# Patient Record
Sex: FEMALE | Race: WHITE | NOT HISPANIC OR LATINO | Employment: FULL TIME | URBAN - METROPOLITAN AREA
[De-identification: names, ages, dates, MRNs, and addresses within clinical notes are randomized per-mention and may not be internally consistent; named-entity substitution may affect disease eponyms.]

---

## 2017-12-28 ENCOUNTER — OFFICE VISIT (OUTPATIENT)
Dept: FAMILY MEDICINE | Facility: CLINIC | Age: 47
End: 2017-12-28
Payer: COMMERCIAL

## 2017-12-28 VITALS
DIASTOLIC BLOOD PRESSURE: 66 MMHG | TEMPERATURE: 98.2 F | BODY MASS INDEX: 36.97 KG/M2 | HEART RATE: 72 BPM | RESPIRATION RATE: 20 BRPM | SYSTOLIC BLOOD PRESSURE: 112 MMHG | WEIGHT: 205.4 LBS

## 2017-12-28 DIAGNOSIS — R07.0 THROAT PAIN: ICD-10-CM

## 2017-12-28 DIAGNOSIS — B34.9 VIRAL SYNDROME: Primary | ICD-10-CM

## 2017-12-28 LAB
DEPRECATED S PYO AG THROAT QL EIA: NORMAL
SPECIMEN SOURCE: NORMAL

## 2017-12-28 PROCEDURE — 99213 OFFICE O/P EST LOW 20 MIN: CPT

## 2017-12-28 PROCEDURE — 87081 CULTURE SCREEN ONLY: CPT | Performed by: NURSE PRACTITIONER

## 2017-12-28 PROCEDURE — 87880 STREP A ASSAY W/OPTIC: CPT | Performed by: NURSE PRACTITIONER

## 2017-12-28 NOTE — LETTER
January 2, 2018      Risa Alcaraz  [address] [facility] [id number]        Dear Risa,           This letter is to inform you that the results of your recent throat culture are negative.  If you have any questions please call or make an appointment.          Sincerely,        JIMBO Phan CNP

## 2017-12-28 NOTE — PROGRESS NOTES
SUBJECTIVE:   Risa Alcaraz is a 47 year old female who presents to clinic today for the following health issues:    ENT Symptoms             Symptoms: cc Present Absent Comment   Fever/Chills  x  Chills    Fatigue  x     Muscle Aches       Eye Irritation       Sneezing       Nasal Charli/Drg  x     Sinus Pressure/Pain       Loss of smell       Dental pain       Sore Throat  x     Swollen Glands       Ear Pain/Fullness       Cough  x     Wheeze       Chest Pain  x  Pressure    Shortness of breath       Rash       Other  x  Headaches      Symptom duration:  Fátima brandon   Symptom severity:  worse    Treatments tried:  Mucinex, Tylenol    Contacts:  Son was sick last week, he got better though                Problem list and histories reviewed & adjusted, as indicated.  Additional history: as documented    Patient Active Problem List   Diagnosis     Allergic rhinitis     CARDIOVASCULAR SCREENING; LDL GOAL LESS THAN 160     Obesity     IUD (intrauterine device) in place     Past Surgical History:   Procedure Laterality Date     C ORAL SURGERY PROCEDURE      wisdom teeth, hypotension with anesthesia       Social History   Substance Use Topics     Smoking status: Former Smoker     Smokeless tobacco: Never Used      Comment: Quit 1996     Alcohol use No      Comment: one or two beers before she knew she was preg.     Family History   Problem Relation Age of Onset     DIABETES Mother      Hypertension Mother      CANCER Father      penial     Hypertension Father      CANCER Maternal Grandmother      liver     HEART DISEASE Maternal Grandfather      MI     CANCER Paternal Grandmother      brain     HEART DISEASE Paternal Grandfather      MI         Current Outpatient Prescriptions   Medication Sig Dispense Refill     TYLENOL 325 MG OR TABS 2 TABLETS EVERY 4 HOURS AS NEEDED       MULTIPLE VITAMIN OR 1 tablet daily       CALCIUM + D OR 1 TABLET DAILY       levonorgestrel (MIRENA) 20 MCG/24HR IUD 1 each (20 mcg) by  Intrauterine route once for 1 dose 1 each 0     No Known Allergies  Labs reviewed in EPIC      Reviewed and updated as needed this visit by clinical staffTobacco  Allergies  Meds  Problems  Med Hx  Surg Hx  Fam Hx  Soc Hx        Reviewed and updated as needed this visit by Provider  Allergies  Meds  Problems         ROS:  Constitutional, HEENT, cardiovascular, pulmonary, GI, , musculoskeletal, neuro, skin, endocrine and psych systems are negative, except as otherwise noted.      OBJECTIVE:   /66 (BP Location: Right arm, Cuff Size: Adult Large)  Pulse 72  Temp 98.2  F (36.8  C) (Tympanic)  Resp 20  Wt 205 lb 6.4 oz (93.2 kg)  BMI 36.97 kg/m2  Body mass index is 36.97 kg/(m^2).   GENERAL: healthy, alert and no distress, nontoxic in appearance  EYES: Eyes grossly normal to inspection, PERRL and conjunctivae and sclerae normal  HENT: ear canals and TM's normal, nose and mouth without ulcers or lesions  NECK: no adenopathy, supple with full ROM  RESP: lungs clear to auscultation - no rales, rhonchi or wheezes  CV: regular rate and rhythm, normal S1 S2, no S3 or S4, no murmur, click or rub, no peripheral edema   ABDOMEN: soft, nontender, no hepatosplenomegaly, no masses   MS: no gross musculoskeletal defects noted, no edema  No rash        ASSESSMENT/PLAN:   Neg rapid strep  Problem List Items Addressed This Visit     None      Visit Diagnoses     Viral syndrome    -  Primary    Throat pain        Relevant Orders    Strep, Rapid Screen (Completed)    Beta strep group A culture (Completed)               Patient Instructions   Increase rest and fluids. Tylenol and/or Ibuprofen for comfort. Cool mist vaporizer. If your symptoms worsen or do not resolve follow up with your primary care provider in 1 week and sooner if needed.      Strep culture is pending will result in 24-48 hours.  If it is positive and change in treatment plan will contact you.      Continue Mucinex 600 mg 12 hour formula for ear,  "head and chest congestion.  It can also thin post nasal drip which can cause a cough and sore throat.      Indications for emergent return to emergency department discussed with patient, who verbalized good understanding and agreement.  Patient understands the limitations of today's evaluation.           Viral Syndrome (Adult)  A viral illness may cause a number of symptoms. The symptoms depend on the part of the body that the virus affects. If it settles in your nose, throat, and lungs, it may cause cough, sore throat, congestion, and sometimes headache. If it settles in your stomach and intestinal tract, it may cause vomiting and diarrhea. Sometimes it causes vague symptoms like \"aching all over,\" feeling tired, loss of appetite, or fever.  A viral illness usually lasts 1 to 2 weeks, but sometimes it lasts longer. In some cases, a more serious infection can look like a viral syndrome in the first few days of the illness. You may need another exam and additional tests to know the difference. Watch for the warning signs listed below.  Home care  Follow these guidelines for taking care of yourself at home:    If symptoms are severe, rest at home for the first 2 to 3 days.    Stay away from cigarette smoke - both your smoke and the smoke from others.    You may use over-the-counter acetaminophen or ibuprofen for fever, muscle aching, and headache, unless another medicine was prescribed for this. If you have chronic liver or kidney disease or ever had a stomach ulcer or GI bleeding, talk with your doctor before using these medicines. No one who is younger than 18 and ill with a fever should take aspirin. It may cause severe disease or death.    Your appetite may be poor, so a light diet is fine. Avoid dehydration by drinking 8 to 12 8-ounce glasses of fluids each day. This may include water; orange juice; lemonade; apple, grape, and cranberry juice; clear fruit drinks; electrolyte replacement and sports drinks; and " decaffeinated teas and coffee. If you have been diagnosed with a kidney disease, ask your doctor how much and what types of fluids you should drink to prevent dehydration. If you have kidney disease, drinking too much fluid can cause it build up in the your body and be dangerous to your health.    Over-the-counter remedies won't shorten the length of the illness but may be helpful for cough, sore throat; and nasal and sinus congestion. Don't use decongestants if you have high blood pressure.  Follow-up care  Follow up with your healthcare provider if you do not improve over the next week.  Call 911  Get emergency medical care if any of the following occur:    Convulsion    Feeling weak, dizzy, or like you are going to faint    Chest pain, shortness of breath, wheezing, or difficulty breathing  When to seek medical advice  Call your healthcare provider right away if any of these occur:    Cough with lots of colored sputum (mucus) or blood in your sputum    Chest pain, shortness of breath, wheezing, or difficulty breathing    Severe headache; face, neck, or ear pain    Severe, constant pain in the lower right side of your belly (abdominal)    Continued vomiting (can t keep liquids down)    Frequent diarrhea (more than 5 times a day); blood (red or black color) or mucus in diarrhea    Feeling weak, dizzy, or like you are going to faint    Extreme thirst    Fever of 100.4 F (38 C) or higher, or as directed by your healthcare provider  Date Last Reviewed: 9/25/2015 2000-2017 [employer]. [address] [place] [address]. All rights reserved. This information is not intended as a substitute for professional medical care. Always follow your healthcare professional's instructions.          CHINEDU Graf SAME DAY PROVIDER  [facility]

## 2017-12-28 NOTE — PATIENT INSTRUCTIONS
"Increase rest and fluids. Tylenol and/or Ibuprofen for comfort. Cool mist vaporizer. If your symptoms worsen or do not resolve follow up with your primary care provider in 1 week and sooner if needed.      Strep culture is pending will result in 24-48 hours.  If it is positive and change in treatment plan will contact you.      Continue Mucinex 600 mg 12 hour formula for ear, head and chest congestion.  It can also thin post nasal drip which can cause a cough and sore throat.      Indications for emergent return to emergency department discussed with patient, who verbalized good understanding and agreement.  Patient understands the limitations of today's evaluation.           Viral Syndrome (Adult)  A viral illness may cause a number of symptoms. The symptoms depend on the part of the body that the virus affects. If it settles in your nose, throat, and lungs, it may cause cough, sore throat, congestion, and sometimes headache. If it settles in your stomach and intestinal tract, it may cause vomiting and diarrhea. Sometimes it causes vague symptoms like \"aching all over,\" feeling tired, loss of appetite, or fever.  A viral illness usually lasts 1 to 2 weeks, but sometimes it lasts longer. In some cases, a more serious infection can look like a viral syndrome in the first few days of the illness. You may need another exam and additional tests to know the difference. Watch for the warning signs listed below.  Home care  Follow these guidelines for taking care of yourself at home:    If symptoms are severe, rest at home for the first 2 to 3 days.    Stay away from cigarette smoke - both your smoke and the smoke from others.    You may use over-the-counter acetaminophen or ibuprofen for fever, muscle aching, and headache, unless another medicine was prescribed for this. If you have chronic liver or kidney disease or ever had a stomach ulcer or GI bleeding, talk with your doctor before using these medicines. No one who is " younger than 18 and ill with a fever should take aspirin. It may cause severe disease or death.    Your appetite may be poor, so a light diet is fine. Avoid dehydration by drinking 8 to 12 8-ounce glasses of fluids each day. This may include water; orange juice; lemonade; apple, grape, and cranberry juice; clear fruit drinks; electrolyte replacement and sports drinks; and decaffeinated teas and coffee. If you have been diagnosed with a kidney disease, ask your doctor how much and what types of fluids you should drink to prevent dehydration. If you have kidney disease, drinking too much fluid can cause it build up in the your body and be dangerous to your health.    Over-the-counter remedies won't shorten the length of the illness but may be helpful for cough, sore throat; and nasal and sinus congestion. Don't use decongestants if you have high blood pressure.  Follow-up care  Follow up with your healthcare provider if you do not improve over the next week.  Call 911  Get emergency medical care if any of the following occur:    Convulsion    Feeling weak, dizzy, or like you are going to faint    Chest pain, shortness of breath, wheezing, or difficulty breathing  When to seek medical advice  Call your healthcare provider right away if any of these occur:    Cough with lots of colored sputum (mucus) or blood in your sputum    Chest pain, shortness of breath, wheezing, or difficulty breathing    Severe headache; face, neck, or ear pain    Severe, constant pain in the lower right side of your belly (abdominal)    Continued vomiting (can t keep liquids down)    Frequent diarrhea (more than 5 times a day); blood (red or black color) or mucus in diarrhea    Feeling weak, dizzy, or like you are going to faint    Extreme thirst    Fever of 100.4 F (38 C) or higher, or as directed by your healthcare provider  Date Last Reviewed: 9/25/2015 2000-2017 [employer]. [address] [place] [address], [place] [address]. All  rights reserved. This information is not intended as a substitute for professional medical care. Always follow your healthcare professional's instructions.

## 2017-12-28 NOTE — MR AVS SNAPSHOT
"              After Visit Summary   12/28/2017    Risa Alcaraz    MRN: [id number]           Patient Information     Date Of Birth          1970        Visit Information        Provider Department      12/28/2017 11:20 AM VEDA SAME DAY PROVIDER [facility]        Today's Diagnoses     Viral syndrome    -  1    Throat pain          Care Instructions    Increase rest and fluids. Tylenol and/or Ibuprofen for comfort. Cool mist vaporizer. If your symptoms worsen or do not resolve follow up with your primary care provider in 1 week and sooner if needed.      Strep culture is pending will result in 24-48 hours.  If it is positive and change in treatment plan will contact you.          Indications for emergent return to emergency department discussed with patient, who verbalized good understanding and agreement.  Patient understands the limitations of today's evaluation.           Viral Syndrome (Adult)  A viral illness may cause a number of symptoms. The symptoms depend on the part of the body that the virus affects. If it settles in your nose, throat, and lungs, it may cause cough, sore throat, congestion, and sometimes headache. If it settles in your stomach and intestinal tract, it may cause vomiting and diarrhea. Sometimes it causes vague symptoms like \"aching all over,\" feeling tired, loss of appetite, or fever.  A viral illness usually lasts 1 to 2 weeks, but sometimes it lasts longer. In some cases, a more serious infection can look like a viral syndrome in the first few days of the illness. You may need another exam and additional tests to know the difference. Watch for the warning signs listed below.  Home care  Follow these guidelines for taking care of yourself at home:    If symptoms are severe, rest at home for the first 2 to 3 days.    Stay away from cigarette smoke - both your smoke and the smoke from others.    You may use over-the-counter acetaminophen or ibuprofen for fever, " muscle aching, and headache, unless another medicine was prescribed for this. If you have chronic liver or kidney disease or ever had a stomach ulcer or GI bleeding, talk with your doctor before using these medicines. No one who is younger than 18 and ill with a fever should take aspirin. It may cause severe disease or death.    Your appetite may be poor, so a light diet is fine. Avoid dehydration by drinking 8 to 12 8-ounce glasses of fluids each day. This may include water; orange juice; lemonade; apple, grape, and cranberry juice; clear fruit drinks; electrolyte replacement and sports drinks; and decaffeinated teas and coffee. If you have been diagnosed with a kidney disease, ask your doctor how much and what types of fluids you should drink to prevent dehydration. If you have kidney disease, drinking too much fluid can cause it build up in the your body and be dangerous to your health.    Over-the-counter remedies won't shorten the length of the illness but may be helpful for cough, sore throat; and nasal and sinus congestion. Don't use decongestants if you have high blood pressure.  Follow-up care  Follow up with your healthcare provider if you do not improve over the next week.  Call 911  Get emergency medical care if any of the following occur:    Convulsion    Feeling weak, dizzy, or like you are going to faint    Chest pain, shortness of breath, wheezing, or difficulty breathing  When to seek medical advice  Call your healthcare provider right away if any of these occur:    Cough with lots of colored sputum (mucus) or blood in your sputum    Chest pain, shortness of breath, wheezing, or difficulty breathing    Severe headache; face, neck, or ear pain    Severe, constant pain in the lower right side of your belly (abdominal)    Continued vomiting (can t keep liquids down)    Frequent diarrhea (more than 5 times a day); blood (red or black color) or mucus in diarrhea    Feeling weak, dizzy, or like you are  going to faint    Extreme thirst    Fever of 100.4 F (38 C) or higher, or as directed by your healthcare provider  Date Last Reviewed: 9/25/2015 2000-2017 [employer]. [address] [place] [address], [place] [address]. All rights reserved. This information is not intended as a substitute for professional medical care. Always follow your healthcare professional's instructions.                Follow-ups after your visit        Follow-up notes from your care team     See patient instructions section of the AVS Return if symptoms worsen or fail to improve, for Follow up with your primary care provider.      Who to contact     If you have questions or need follow up information about today's clinic visit or your schedule please contact [facility] directly at [phone].  Normal or non-critical lab and imaging results will be communicated to you by MyChart, letter or phone within 4 business days after the clinic has received the results. If you do not hear from us within 7 days, please contact the clinic through [employer] or phone. If you have a critical or abnormal lab result, we will notify you by phone as soon as possible.  Submit refill requests through [employer] or call your pharmacy and they will forward the refill request to us. Please allow 3 business days for your refill to be completed.          Additional Information About Your Visit        [employer] Information     [employer] gives you secure access to your electronic health record. If you see a primary care provider, you can also send messages to your care team and make appointments. If you have questions, please call your primary care clinic.  If you do not have a primary care provider, please call [phone] and they will assist you.        Care EveryWhere ID     This is your Care EveryWhere ID. This could be used by other organizations to access your [facility] medical records  [id number]        Your Vitals Were     Pulse Temperature  Respirations BMI (Body Mass Index)          72 98.2  F (36.8  C) (Tympanic) 20 36.97 kg/m2         Blood Pressure from Last 3 Encounters:   12/28/17 112/66   12/15/16 125/71   12/01/16 129/77    Weight from Last 3 Encounters:   12/28/17 205 lb 6.4 oz (93.2 kg)   12/15/16 192 lb 9.6 oz (87.4 kg)   12/01/16 193 lb (87.5 kg)              We Performed the Following     Beta strep group A culture     Strep, Rapid Screen        Primary Care Provider Office Phone # Fax #    Luz Maria Shipley PA-C [phone] [address] [facility] [address]        Equal Access to Services     SANDER WALKER : Abimael gonzalezo Sokirit, waaxda luqadaha, qaybta kaalmada adeegyada, raymundo cartwright . So [facility] [phone].    ATENCIÓN: Si habla español, tiene a joseph disposición servicios gratuitos de asistencia lingüística. Llame al [phone].    We comply with applicable federal civil rights laws and [place] laws. We do not discriminate on the basis of race, color, national origin, age, disability, sex, sexual orientation, or gender identity.            Thank you!     Thank you for choosing [facility]  for your care. Our goal is always to provide you with excellent care. Hearing back from our patients is one way we can continue to improve our services. Please take a few minutes to complete the written survey that you may receive in the mail after your visit with us. Thank you!             Your Updated Medication List - Protect others around you: Learn how to safely use, store and throw away your medicines at www.disposemymeds.org.          This list is accurate as of: 12/28/17 12:41 PM.  Always use your most recent med list.                   Brand Name Dispense Instructions for use Diagnosis    CALCIUM + D PO      1 TABLET DAILY    Supervision of other normal pregnancy       levonorgestrel 20 MCG/24HR IUD    MIRENA    1 each    1 each (20 mcg) by Intrauterine route  once for 1 dose    Encounter for IUD insertion       MULTIPLE VITAMIN PO      1 tablet daily        TYLENOL 325 MG tablet   Generic drug:  acetaminophen      2 TABLETS EVERY 4 HOURS AS NEEDED

## 2017-12-28 NOTE — NURSING NOTE
"Chief Complaint   Patient presents with     URI       Initial /66 (BP Location: Right arm, Cuff Size: Adult Large)  Pulse 72  Temp 98.2  F (36.8  C) (Tympanic)  Resp 20  Wt 205 lb 6.4 oz (93.2 kg)  BMI 36.97 kg/m2 Estimated body mass index is 36.97 kg/(m^2) as calculated from the following:    Height as of 12/15/16: 5' 2.5\" (1.588 m).    Weight as of this encounter: 205 lb 6.4 oz (93.2 kg).  Medication Reconciliation: complete    Health Maintenance that is potentially due pending provider review:  NONE    n/a    Is there anyone who you would like to be able to receive your results? Not Applicable  If yes have patient fill out RONALD  Roseann Hdz M.A.      "

## 2017-12-29 LAB
BACTERIA SPEC CULT: NORMAL
SPECIMEN SOURCE: NORMAL

## 2019-03-08 ENCOUNTER — OFFICE VISIT (OUTPATIENT)
Dept: FAMILY MEDICINE | Facility: CLINIC | Age: 49
End: 2019-03-08
Payer: COMMERCIAL

## 2019-03-08 VITALS
SYSTOLIC BLOOD PRESSURE: 124 MMHG | HEART RATE: 86 BPM | DIASTOLIC BLOOD PRESSURE: 78 MMHG | RESPIRATION RATE: 16 BRPM | TEMPERATURE: 99.7 F | WEIGHT: 202.2 LBS | OXYGEN SATURATION: 98 % | HEIGHT: 63 IN | BODY MASS INDEX: 35.83 KG/M2

## 2019-03-08 DIAGNOSIS — Z00.00 ROUTINE HISTORY AND PHYSICAL EXAMINATION OF ADULT: Primary | ICD-10-CM

## 2019-03-08 DIAGNOSIS — N63.24 BREAST LUMP ON LEFT SIDE AT 7 O'CLOCK POSITION: ICD-10-CM

## 2019-03-08 DIAGNOSIS — Z23 NEED FOR VACCINATION: ICD-10-CM

## 2019-03-08 DIAGNOSIS — E66.01 MORBID OBESITY (H): ICD-10-CM

## 2019-03-08 PROCEDURE — 99213 OFFICE O/P EST LOW 20 MIN: CPT | Mod: 25 | Performed by: PHYSICIAN ASSISTANT

## 2019-03-08 PROCEDURE — 90471 IMMUNIZATION ADMIN: CPT | Performed by: PHYSICIAN ASSISTANT

## 2019-03-08 PROCEDURE — 99396 PREV VISIT EST AGE 40-64: CPT | Mod: 25 | Performed by: PHYSICIAN ASSISTANT

## 2019-03-08 PROCEDURE — 90472 IMMUNIZATION ADMIN EACH ADD: CPT | Performed by: PHYSICIAN ASSISTANT

## 2019-03-08 PROCEDURE — 90632 HEPA VACCINE ADULT IM: CPT | Performed by: PHYSICIAN ASSISTANT

## 2019-03-08 PROCEDURE — 90746 HEPB VACCINE 3 DOSE ADULT IM: CPT | Performed by: PHYSICIAN ASSISTANT

## 2019-03-08 ASSESSMENT — ENCOUNTER SYMPTOMS
HEARTBURN: 0
NERVOUS/ANXIOUS: 0
HEMATURIA: 0
FEVER: 0
PARESTHESIAS: 0
CHILLS: 0
PALPITATIONS: 0
NAUSEA: 0
HEADACHES: 0
JOINT SWELLING: 0
ABDOMINAL PAIN: 0
DYSURIA: 0
MYALGIAS: 0
WEAKNESS: 0
FREQUENCY: 0
HEMATOCHEZIA: 0
CONSTIPATION: 0
BREAST MASS: 1
SORE THROAT: 0
DIARRHEA: 0
SHORTNESS OF BREATH: 0
EYE PAIN: 0
COUGH: 0
DIZZINESS: 0

## 2019-03-08 ASSESSMENT — MIFFLIN-ST. JEOR: SCORE: 1518.36

## 2019-03-08 NOTE — PROGRESS NOTES
SUBJECTIVE:   CC: Risa Alcaraz is an 48 year old woman who presents for preventive health visit.     Physical   Annual:     Getting at least 3 servings of Calcium per day:  Yes    Bi-annual eye exam:  Yes    Dental care twice a year:  NO    Sleep apnea or symptoms of sleep apnea:  None    Diet:  Carbohydrate counting    Frequency of exercise:  6-7 days/week    Duration of exercise:  Greater than 60 minutes    Taking medications regularly:  Yes    Medication side effects:  None    Additional concerns today:  No    PHQ-2 Total Score: 0    Mirena 2016.    Lump L breast.  X 1 mo, not changing.  No discharge or skin change.  No fam history breast cancer.     Diet - 150 carb/day lately, adequate fruits/veggies, plus V8.  1200 marvin/day.  Aerobic 45-60 min, 15 min wt lifting, plus abdominal work out.  Likes to haul wood, shovel snow, etc.  Wt is stable.  She is very active, content with her wt, BMI 36, not wanting wt loss meds, referral, or any labs for screening glucose, etc.    Today's PHQ-2 Score:   PHQ-2 ( 1999 Pfizer) 3/8/2019   Q1: Little interest or pleasure in doing things 0   Q2: Feeling down, depressed or hopeless 0   PHQ-2 Score 0   Q1: Little interest or pleasure in doing things Not at all   Q2: Feeling down, depressed or hopeless Not at all   PHQ-2 Score 0     Abuse: Current or Past(Physical, Sexual or Emotional)- No  Do you feel safe in your environment? Yes    Social History     Tobacco Use     Smoking status: Former Smoker     Smokeless tobacco: Never Used     Tobacco comment: Quit 1996   Substance Use Topics     Alcohol use: No     Comment: one or two beers before she knew she was preg.     Alcohol Use 3/8/2019   If you drink alcohol do you typically have greater than 3 drinks per day OR greater than 7 drinks per week? No     Reviewed orders with patient.  Reviewed health maintenance and updated orders accordingly - Yes  Labs reviewed in EPIC  BP Readings from Last 3 Encounters:   03/08/19 124/78  "  12/28/17 112/66   12/15/16 125/71    Wt Readings from Last 3 Encounters:   03/08/19 91.7 kg (202 lb 3.2 oz)   12/28/17 93.2 kg (205 lb 6.4 oz)   12/15/16 87.4 kg (192 lb 9.6 oz)         Mammogram Screening: Patient under age 50, mutual decision reflected in health maintenance.    Pertinent mammograms are reviewed under the imaging tab.  History of abnormal Pap smear: NO - age 30-65 PAP every 5 years with negative HPV co-testing recommended  PAP / HPV Latest Ref Rng & Units 12/1/2016 3/7/2013 2/20/2008   PAP - NIL NIL NIL   HPV 16 DNA NEG Negative - -   HPV 18 DNA NEG Negative - -   OTHER HR HPV NEG Negative - -     Reviewed and updated as needed this visit by clinical staff  Tobacco  Allergies  Meds  Problems  Med Hx  Surg Hx  Fam Hx  Soc Hx          Reviewed and updated as needed this visit by Provider  Allergies  Meds  Problems  Med Hx  Surg Hx  Fam Hx          Review of Systems   Constitutional: Negative for chills and fever.   HENT: Negative for congestion, ear pain, hearing loss and sore throat.    Eyes: Negative for pain and visual disturbance.   Respiratory: Negative for cough and shortness of breath.    Cardiovascular: Negative for chest pain and palpitations.   Gastrointestinal: Negative for abdominal pain, constipation, diarrhea, heartburn, hematochezia and nausea.   Breasts:  Positive for tenderness and breast mass. Negative for discharge.   Genitourinary: Negative for dysuria, frequency, genital sores, hematuria, pelvic pain, urgency, vaginal bleeding and vaginal discharge.   Musculoskeletal: Negative for joint swelling and myalgias.   Skin: Negative for rash.   Neurological: Negative for dizziness, weakness, headaches and paresthesias.   Psychiatric/Behavioral: Negative for mood changes. The patient is not nervous/anxious.      OBJECTIVE:   /78   Pulse 86   Temp 99.7  F (37.6  C) (Tympanic)   Resp 16   Ht 1.588 m (5' 2.5\")   Wt 91.7 kg (202 lb 3.2 oz)   LMP 02/24/2019 " (Approximate)   SpO2 98%   BMI 36.39 kg/m    Physical Exam  GENERAL: healthy, alert and no distress  EYES: Eyes grossly normal to inspection, PERRL and conjunctivae and sclerae normal  HENT: ear canals and TM's normal, nose and mouth without ulcers or lesions  NECK: no adenopathy, no asymmetry, masses, or scars and thyroid normal to palpation  RESP: lungs clear to auscultation - no rales, rhonchi or wheezes  BREAST: L breast with mildly palpable mass approx 2 cm x 1 cm at 7 o clock, otherwise normal without masses, tenderness or nipple discharge and no palpable axillary masses or adenopathy  CV: regular rate and rhythm, normal S1 S2, no S3 or S4, no murmur, click or rub, no peripheral edema  ABDOMEN: soft, nontender, no hepatosplenomegaly, no masses and bowel sounds normal  MS: no gross musculoskeletal defects noted, no edema  SKIN: no suspicious lesions or rashes  NEURO: Normal strength and tone, mentation intact and speech normal  PSYCH: mentation appears normal, affect normal/bright    ASSESSMENT/PLAN:       ICD-10-CM    1. Routine history and physical examination of adult Z00.00    2. Breast lump on left side at 7 o'clock position N63.24 US Breast Left Limited 1-3 Quadrants     MA Diagnostic Bilateral w/Arnel     CANCELED: MA Diagnostic Digital Bilateral   3. Morbid obesity (H) E66.01    4. Need for vaccination Z23 HEPATITIS A VACCINE, ADULT  [53686]     HEPATITIS B VACCINE,  ADULT  [39351]     1st  Administration  [38233]     Each additional admin.  (Right click and add QUANTITY)  [55164]   She is very active, content with her wt, BMI 36, not wanting wt loss meds, referral, or any labs for screening glucose, etc.    Patient Instructions   Call [phone] to set up your imaging testing.    Keep up the wonderful healthy lifestyle    Starting hep A and B vaccines    Preventive Health Recommendations  Female Ages 40 to 49    Yearly exam:     See your health care provider every year in order to  1. Review  "health changes.   2. Discuss preventive care.    3. Review your medicines if your doctor prescribed any.      Get a Pap test every three years (unless you have an abnormal result and your provider advises testing more often).      If you get Pap tests with HPV test, you only need to test every 5 years, unless you have an abnormal result. You do not need a Pap test if your uterus was removed (hysterectomy) and you have not had cancer.      You should be tested each year for STDs (sexually transmitted diseases), if you're at risk.     Ask your doctor if you should have a mammogram.      Have a colonoscopy (test for colon cancer) if someone in your family has had colon cancer or polyps before age 50.       Have a cholesterol test every 5 years.       Have a diabetes test (fasting glucose) after age 45. If you are at risk for diabetes, you should have this test every 3 years.    Shots: Get a flu shot each year. Get a tetanus shot every 10 years.     Nutrition:     Eat at least 5 servings of fruits and vegetables each day.    Eat whole-grain bread, whole-wheat pasta and brown rice instead of white grains and rice.    Get adequate Calcium and Vitamin D.      Lifestyle    Exercise at least 150 minutes a week (an average of 30 minutes a day, 5 days a week). This will help you control your weight and prevent disease.    Limit alcohol to one drink per day.    No smoking.     Wear sunscreen to prevent skin cancer.    See your dentist every six months for an exam and cleaning.      COUNSELING:  Reviewed preventive health counseling, as reflected in patient instructions       Regular exercise       Healthy diet/nutrition    BP Readings from Last 1 Encounters:   03/08/19 124/78     Estimated body mass index is 36.39 kg/m  as calculated from the following:    Height as of this encounter: 1.588 m (5' 2.5\").    Weight as of this encounter: 91.7 kg (202 lb 3.2 oz).      Weight management plan: Discussed healthy diet and exercise " guidelines     reports that she has quit smoking. she has never used smokeless tobacco.      Counseling Resources:  ATP IV Guidelines  Pooled Cohorts Equation Calculator  Breast Cancer Risk Calculator  FRAX Risk Assessment  ICSI Preventive Guidelines  Dietary Guidelines for Americans, 2010  USDA's MyPlate  ASA Prophylaxis  Lung CA Screening    Luz Maria Shipley PA-C  [facility]

## 2019-03-08 NOTE — PROGRESS NOTES
"   SUBJECTIVE:   CC: Risa Alcaraz is an 48 year old woman who presents for preventive health visit.     Healthy Habits:    Do you get at least three servings of calcium containing foods daily (dairy, green leafy vegetables, etc.)? { :591825::\"yes\"}    Amount of exercise or daily activities, outside of work: { :321114}    Problems taking medications regularly { :766399::\"No\"}    Medication side effects: { :903096::\"No\"}    Have you had an eye exam in the past two years? { :803626}    Do you see a dentist twice per year? { :884072}    Do you have sleep apnea, excessive snoring or daytime drowsiness?{ :096403}  {Outside tests to abstract? :858381}    {additional problems to add (Optional):278425}    Today's PHQ-2 Score:   PHQ-2 ( 1999 Pfizer) 3/8/2019 3/8/2019   Q1: Little interest or pleasure in doing things 0 0   Q2: Feeling down, depressed or hopeless 0 0   PHQ-2 Score 0 0   Q1: Little interest or pleasure in doing things Not at all -   Q2: Feeling down, depressed or hopeless Not at all -   PHQ-2 Score 0 -     {PHQ-2 LOOK IN ASSESSMENTS (Optional) :381306}  Abuse: Current or Past(Physical, Sexual or Emotional)- {YES/NO/NA:180470}  Do you feel safe in your environment? {YES/NO/NA:842961}    Social History     Tobacco Use     Smoking status: Former Smoker     Smokeless tobacco: Never Used     Tobacco comment: Quit 1996   Substance Use Topics     Alcohol use: No     Comment: one or two beers before she knew she was preg.     If you drink alcohol do you typically have >3 drinks per day or >7 drinks per week? {ETOH :085211}                     Reviewed orders with patient.  Reviewed health maintenance and updated orders accordingly - {Yes/No:134731::\"Yes\"}  {Chronicprobdata (Optional):135918}    {Mammo Decision Support (Optional):793665}    Pertinent mammograms are reviewed under the imaging tab.  History of abnormal Pap smear: {PAP HX:739029}  PAP / HPV Latest Ref Rng & Units 12/1/2016 3/7/2013 2/20/2008   PAP - NIL " "NIL NIL   HPV 16 DNA NEG Negative - -   HPV 18 DNA NEG Negative - -   OTHER HR HPV NEG Negative - -     Reviewed and updated as needed this visit by clinical staff  Tobacco  Allergies  Meds  Med Hx  Surg Hx  Fam Hx  Soc Hx        Reviewed and updated as needed this visit by Provider        {HISTORY OPTIONS (Optional):601580}    ROS:  { :575632}    OBJECTIVE:   /78   Pulse 86   Temp 99.7  F (37.6  C) (Tympanic)   Resp 16   Ht 1.588 m (5' 2.5\")   Wt 91.7 kg (202 lb 3.2 oz)   LMP 02/24/2019 (Approximate)   SpO2 98%   BMI 36.39 kg/m    EXAM:  {Exam Choices:956248}    {Diagnostic Test Results (Optional):874592::\"Diagnostic Test Results:\",\"none \"}    ASSESSMENT/PLAN:   {Diag Picklist:128501}    COUNSELING:   {FEMALE COUNSELING MESSAGES:853839::\"Reviewed preventive health counseling, as reflected in patient instructions\"}    BP Readings from Last 1 Encounters:   03/08/19 124/78     Estimated body mass index is 36.39 kg/m  as calculated from the following:    Height as of this encounter: 1.588 m (5' 2.5\").    Weight as of this encounter: 91.7 kg (202 lb 3.2 oz).    {BP Counseling- Complete if BP >= 120/80  (Optional):682057}  {Weight Management Plan (ACO) Complete if BMI is abnormal-  Ages 18-64  BMI >24.9.  Age 65+ with BMI <23 or >30 (Optional):163257}     reports that she has quit smoking. she has never used smokeless tobacco.  {Tobacco Cessation -- Complete if patient is a smoker (Optional):249731}    Counseling Resources:  ATP IV Guidelines  Pooled Cohorts Equation Calculator  Breast Cancer Risk Calculator  FRAX Risk Assessment  ICSI Preventive Guidelines  Dietary Guidelines for Americans, 2010  USDA's MyPlate  ASA Prophylaxis  Lung CA Screening    Luz Maria Shipley PA-C  [facility]  Answers for HPI/ROS submitted by the patient on 3/8/2019   Annual Exam:  Frequency of exercise:: 6-7 days/week  Getting at least 3 servings of Calcium per day:: Yes  Diet:: Carbohydrate " counting  Taking medications regularly:: Yes  Medication side effects:: None  Bi-annual eye exam:: Yes  Dental care twice a year:: NO  Sleep apnea or symptoms of sleep apnea:: None  Negative for the following: abdominal pain, Blood in stool, Blood in urine, chest pain, chills, congestion, constipation, cough, diarrhea, dizziness, ear pain, eye pain, nervous/anxious, fever, frequency, genital sores, headaches, hearing loss, heartburn, arthralgias, joint swelling, peripheral edema, mood changes, myalgias, nausea, dysuria, palpitations, Skin sensation changes, sore throat, urgency, rash, shortness of breath, visual disturbance, weakness  pelvic pain: No  vaginal bleeding: No  vaginal discharge: No  tenderness: Yes  breast mass: Yes  breast discharge: No  Additional concerns today:: No  Duration of exercise:: Greater than 60 minutes

## 2019-03-08 NOTE — PATIENT INSTRUCTIONS
Call [phone] to set up your imaging testing.    Keep up the wonderful healthy lifestyle    Starting hep A and B vaccines    Preventive Health Recommendations  Female Ages 40 to 49    Yearly exam:     See your health care provider every year in order to  1. Review health changes.   2. Discuss preventive care.    3. Review your medicines if your doctor prescribed any.      Get a Pap test every three years (unless you have an abnormal result and your provider advises testing more often).      If you get Pap tests with HPV test, you only need to test every 5 years, unless you have an abnormal result. You do not need a Pap test if your uterus was removed (hysterectomy) and you have not had cancer.      You should be tested each year for STDs (sexually transmitted diseases), if you're at risk.     Ask your doctor if you should have a mammogram.      Have a colonoscopy (test for colon cancer) if someone in your family has had colon cancer or polyps before age 50.       Have a cholesterol test every 5 years.       Have a diabetes test (fasting glucose) after age 45. If you are at risk for diabetes, you should have this test every 3 years.    Shots: Get a flu shot each year. Get a tetanus shot every 10 years.     Nutrition:     Eat at least 5 servings of fruits and vegetables each day.    Eat whole-grain bread, whole-wheat pasta and brown rice instead of white grains and rice.    Get adequate Calcium and Vitamin D.      Lifestyle    Exercise at least 150 minutes a week (an average of 30 minutes a day, 5 days a week). This will help you control your weight and prevent disease.    Limit alcohol to one drink per day.    No smoking.     Wear sunscreen to prevent skin cancer.    See your dentist every six months for an exam and cleaning.

## 2019-03-11 ENCOUNTER — HOSPITAL ENCOUNTER (OUTPATIENT)
Dept: ULTRASOUND IMAGING | Facility: CLINIC | Age: 49
End: 2019-03-11
Attending: PHYSICIAN ASSISTANT
Payer: COMMERCIAL

## 2019-03-11 ENCOUNTER — HOSPITAL ENCOUNTER (OUTPATIENT)
Dept: MAMMOGRAPHY | Facility: CLINIC | Age: 49
Discharge: HOME | End: 2019-03-11
Attending: PHYSICIAN ASSISTANT | Admitting: PHYSICIAN ASSISTANT
Payer: COMMERCIAL

## 2019-03-11 DIAGNOSIS — N63.24 BREAST LUMP ON LEFT SIDE AT 7 O'CLOCK POSITION: ICD-10-CM

## 2019-03-11 PROCEDURE — 76642 ULTRASOUND BREAST LIMITED: CPT | Mod: LT

## 2019-03-11 PROCEDURE — 77066 DX MAMMO INCL CAD BI: CPT

## 2019-03-11 PROCEDURE — G0279 TOMOSYNTHESIS, MAMMO: HCPCS

## 2019-04-11 ENCOUNTER — ALLIED HEALTH/NURSE VISIT (OUTPATIENT)
Dept: FAMILY MEDICINE | Facility: CLINIC | Age: 49
End: 2019-04-11
Payer: COMMERCIAL

## 2019-04-11 DIAGNOSIS — Z23 NEED FOR HEPATITIS B VACCINATION: Primary | ICD-10-CM

## 2019-04-11 PROCEDURE — 90471 IMMUNIZATION ADMIN: CPT

## 2019-04-11 PROCEDURE — 99207 ZZC NO CHARGE NURSE ONLY: CPT

## 2019-04-11 PROCEDURE — 90746 HEPB VACCINE 3 DOSE ADULT IM: CPT

## 2019-06-18 ENCOUNTER — MYC MEDICAL ADVICE (OUTPATIENT)
Dept: FAMILY MEDICINE | Facility: CLINIC | Age: 49
End: 2019-06-18

## 2020-11-16 ENCOUNTER — HEALTH MAINTENANCE LETTER (OUTPATIENT)
Age: 50
End: 2020-11-16

## 2021-02-07 ENCOUNTER — HEALTH MAINTENANCE LETTER (OUTPATIENT)
Age: 51
End: 2021-02-07

## 2021-09-18 ENCOUNTER — HEALTH MAINTENANCE LETTER (OUTPATIENT)
Age: 51
End: 2021-09-18

## 2022-01-08 ENCOUNTER — HEALTH MAINTENANCE LETTER (OUTPATIENT)
Age: 52
End: 2022-01-08

## 2022-03-05 ENCOUNTER — HEALTH MAINTENANCE LETTER (OUTPATIENT)
Age: 52
End: 2022-03-05

## 2022-04-14 ENCOUNTER — OFFICE VISIT (OUTPATIENT)
Dept: FAMILY MEDICINE | Facility: CLINIC | Age: 52
End: 2022-04-14
Payer: COMMERCIAL

## 2022-04-14 VITALS
BODY MASS INDEX: 38.64 KG/M2 | SYSTOLIC BLOOD PRESSURE: 128 MMHG | WEIGHT: 210 LBS | OXYGEN SATURATION: 98 % | HEART RATE: 96 BPM | TEMPERATURE: 97.3 F | HEIGHT: 62 IN | DIASTOLIC BLOOD PRESSURE: 80 MMHG

## 2022-04-14 DIAGNOSIS — Z00.00 ROUTINE GENERAL MEDICAL EXAMINATION AT A HEALTH CARE FACILITY: Primary | ICD-10-CM

## 2022-04-14 DIAGNOSIS — Z11.59 NEED FOR HEPATITIS C SCREENING TEST: ICD-10-CM

## 2022-04-14 DIAGNOSIS — Z97.5 IUD CONTRACEPTION: ICD-10-CM

## 2022-04-14 DIAGNOSIS — Z13.220 SCREENING FOR HYPERLIPIDEMIA: ICD-10-CM

## 2022-04-14 DIAGNOSIS — Z12.11 SCREEN FOR COLON CANCER: ICD-10-CM

## 2022-04-14 DIAGNOSIS — Z12.4 CERVICAL CANCER SCREENING: ICD-10-CM

## 2022-04-14 PROCEDURE — G0145 SCR C/V CYTO,THINLAYER,RESCR: HCPCS | Performed by: NURSE PRACTITIONER

## 2022-04-14 PROCEDURE — 87624 HPV HI-RISK TYP POOLED RSLT: CPT | Performed by: NURSE PRACTITIONER

## 2022-04-14 PROCEDURE — 99396 PREV VISIT EST AGE 40-64: CPT | Performed by: NURSE PRACTITIONER

## 2022-04-14 ASSESSMENT — ENCOUNTER SYMPTOMS
PALPITATIONS: 0
HEADACHES: 0
EYE PAIN: 0
ABDOMINAL PAIN: 0
NERVOUS/ANXIOUS: 0
DIARRHEA: 0
MYALGIAS: 0
SORE THROAT: 0
ARTHRALGIAS: 0
JOINT SWELLING: 0
CONSTIPATION: 0
NAUSEA: 0
FREQUENCY: 0
PARESTHESIAS: 0
WEAKNESS: 0
HEARTBURN: 0
FEVER: 0
DIZZINESS: 0
DYSURIA: 0
CHILLS: 0
BREAST MASS: 0
SHORTNESS OF BREATH: 0
HEMATURIA: 0
HEMATOCHEZIA: 0
COUGH: 0

## 2022-04-14 ASSESSMENT — PAIN SCALES - GENERAL: PAINLEVEL: NO PAIN (0)

## 2022-04-14 NOTE — PROGRESS NOTES
SUBJECTIVE:   CC: Risa Alcaraz is an 51 year old woman who presents for preventive health visit.       Patient has been advised of split billing requirements and indicates understanding: Yes  Healthy Habits:     Getting at least 3 servings of Calcium per day:  Yes    Bi-annual eye exam:  Yes    Dental care twice a year:  NO    Sleep apnea or symptoms of sleep apnea:  None    Diet:  Regular (no restrictions)    Frequency of exercise:  6-7 days/week    Duration of exercise:  Greater than 60 minutes    Taking medications regularly:  Yes    Medication side effects:  Not applicable    PHQ-2 Total Score: 0    Additional concerns today:  No      Left breast tenderness off and on two weeks ago  Previous biopsy. Benign   mirena in x 6 yrs. Due for it to be changed  No period since IUD put in .   Not able to feel strings at this time.   no gestational diabetes  Non smoker  No hosp, no surgeries.   No know drug allergies  claritin melt aways for allergy symptoms x 1 week.   Hot flashes in the middle of the night   Biometric screening for insurance  Wants 3D mammogram scheduled for [place] next Friday  Occasional left breast outer tenderness    -------------------------------------    Today's PHQ-2 Score:   PHQ-2 (  [employer]) 2022   Q1: Little interest or pleasure in doing things 0   Q2: Feeling down, depressed or hopeless 0   PHQ-2 Score 0   PHQ-2 Total Score (12-17 Years)- Positive if 3 or more points; Administer PHQ-A if positive -   Q1: Little interest or pleasure in doing things Not at all   Q2: Feeling down, depressed or hopeless Not at all   PHQ-2 Score 0       Abuse: Current or Past (Physical, Sexual or Emotional) - No  Do you feel safe in your environment? Yes    Have you ever done Advance Care Planning? (For example, a Health Directive, POLST, or a discussion with a medical provider or your loved ones about your wishes): Yes, patient states has an Advance Care Planning document and will bring a copy to  the clinic.    Social History     Tobacco Use     Smoking status: Former Smoker     Types: Cigarettes, Cigars     Smokeless tobacco: Never Used     Tobacco comment: Quit 1996   Substance Use Topics     Alcohol use: No     Comment: one or two beers before she knew she was preg.         Alcohol Use 4/14/2022   Prescreen: >3 drinks/day or >7 drinks/week? No   Prescreen: >3 drinks/day or >7 drinks/week? -       Reviewed orders with patient.  Reviewed health maintenance and updated orders accordingly - Yes  Patient Active Problem List   Diagnosis     Allergic rhinitis     CARDIOVASCULAR SCREENING; LDL GOAL LESS THAN 160     Obesity     IUD (intrauterine device) in place     Past Surgical History:   Procedure Laterality Date     [facility] ORAL SURGERY PROCEDURE      wisdom teeth, hypotension with anesthesia       Social History     Tobacco Use     Smoking status: Former Smoker     Types: Cigarettes, Cigars     Smokeless tobacco: Never Used     Tobacco comment: Quit 1996   Substance Use Topics     Alcohol use: No     Comment: one or two beers before she knew she was preg.     Family History   Problem Relation Age of Onset     Diabetes Mother      Hypertension Mother      Cancer Father         penial     Hypertension Father      Cancer Maternal Grandmother         liver     Heart Disease Maternal Grandfather         MI     Cancer Paternal Grandmother         brain     Heart Disease Paternal Grandfather         MI         Current Outpatient Medications   Medication Sig Dispense Refill     levonorgestrel (MIRENA) 20 MCG/24HR IUD 1 each (20 mcg) by Intrauterine route once for 1 dose 1 each 0     TYLENOL 325 MG OR TABS 2 TABLETS EVERY 4 HOURS AS NEEDED         Breast Cancer Screening:    Breast CA Risk Assessment (FHS-7) 4/14/2022   Do you have a family history of breast, colon, or ovarian cancer? No / Unknown         Mammogram Screening: Recommended annual mammography  Pertinent mammograms are reviewed under the imaging  tab.    History of abnormal Pap smear: NO - age 30-65 PAP every 5 years with negative HPV co-testing recommended  PAP / HPV Latest Ref Rng & Units 2016 3/7/2013 2008   PAP (Historical) - NIL NIL NIL   HPV16 NEG Negative - -   HPV18 NEG Negative - -   HRHPV NEG Negative - -     Reviewed and updated as needed this visit by clinical staff   Tobacco  Allergies  Meds                Reviewed and updated as needed this visit by Provider                   Past Medical History:   Diagnosis Date     NO ACTIVE PROBLEMS       Past Surgical History:   Procedure Laterality Date     [facility] ORAL SURGERY PROCEDURE      wisdom teeth, hypotension with anesthesia     OB History    Para Term  AB Living   2 2 2 0 0 2   SAB IAB Ectopic Multiple Live Births   0 0 0 0 2      # Outcome Date GA Lbr Oni/2nd Weight Sex Delivery Anes PTL Lv   2 Term 06 41w2d 06:04 3.827 kg (8 lb 7 oz) M IVD   [place]      Name: Rahul      Apgar1: 9  Apgar5: 9   1 Term 03 40w0d  3.771 kg (8 lb 5 oz) F    [place]      Birth Comments: preeclampsia, on antihypertenisve PP x 2 weeks      Name: Scott       Review of Systems   Constitutional: Negative for chills and fever.   HENT: Negative for congestion, ear pain, hearing loss and sore throat.    Eyes: Negative for pain and visual disturbance.   Respiratory: Negative for cough and shortness of breath.    Cardiovascular: Negative for chest pain, palpitations and peripheral edema.   Gastrointestinal: Negative for abdominal pain, constipation, diarrhea, heartburn, hematochezia and nausea.   Breasts:  Negative for tenderness, breast mass and discharge.   Genitourinary: Negative for dysuria, frequency, genital sores, hematuria, pelvic pain, urgency, vaginal bleeding and vaginal discharge.   Musculoskeletal: Negative for arthralgias, joint swelling and myalgias.   Skin: Negative for rash.   Neurological: Negative for dizziness, weakness, headaches and paresthesias.  "  Psychiatric/Behavioral: Negative for mood changes. The patient is not nervous/anxious.           OBJECTIVE:   /80   Pulse 96   Temp 97.3  F (36.3  C) (Tympanic)   Ht 1.581 m (5' 2.25\")   Wt 95.3 kg (210 lb)   SpO2 98%   BMI 38.10 kg/m    Physical Exam  GENERAL APPEARANCE: healthy, alert and no distress  EYES: Eyes grossly normal to inspection, PERRL and conjunctivae and sclerae normal  HENT: ear canals and TM's normal, nose and mouth without ulcers or lesions, oropharynx clear and oral mucous membranes moist  NECK: no adenopathy, no asymmetry, masses, or scars and thyroid normal to palpation  RESP: lungs clear to auscultation - no rales, rhonchi or wheezes  BREAST: tenderness left lateral breast.  Right is unremarkable.  CV: regular rate and rhythm, normal S1 S2, no S3 or S4, no murmur, click or rub, no peripheral edema and peripheral pulses strong  ABDOMEN: soft, nontender, no hepatosplenomegaly, no masses and bowel sounds normal  MS: no musculoskeletal defects are noted and gait is age appropriate without ataxia  Pelvic Exam:  Vulva: No external lesions, normal hair distribution, no adenopathy  Vagina: Moist, pink, no abnormal discharge, well rugated, no lesions  Cervix: Pap smear is taken, parous, smooth, pink, no visible lesions IUD strings in place  Uterus: Normal size, anteverted, non-tender, mobile  Ovaries: No mass, non-tender, mobile  SKIN: no suspicious lesions or rashes  NEURO: Normal strength and tone, sensory exam grossly normal, mentation intact and speech normal  PSYCH: mentation appears normal and affect normal/bright    Diagnostic Test Results:  Labs reviewed in Epic  No results found for any visits on 04/14/22.    ASSESSMENT/PLAN:   Risa was seen today for physical.    Diagnoses and all orders for this visit:    Routine general medical examination at a health care facility  -     Basic metabolic panel  (Ca, Cl, CO2, Creat, Gluc, K, Na, BUN); Future  -     CBC with platelets and " "differential; Future    Screen for colon cancer  Colonoscopy due  -     Adult Gastro Ref - Procedure Only; Future    Need for hepatitis C screening test  Declined    Screening for hyperlipidemia  Return to the clinic fasting for same  -     Lipid panel reflex to direct LDL Fasting; Future    Cervical cancer screening  Completed today  -     Pap Screen with HPV - recommended age 30 - 65 years    IUD contraception    -     Ob/Gyn Referral; Future for replacement    Other orders  -     REVIEW OF HEALTH MAINTENANCE PROTOCOL ORDERS        Patient has been advised of split billing requirements and indicates understanding: Yes    COUNSELING:  Reviewed preventive health counseling, as reflected in patient instructions    Estimated body mass index is 38.1 kg/m  as calculated from the following:    Height as of this encounter: 1.581 m (5' 2.25\").    Weight as of this encounter: 95.3 kg (210 lb).    Weight management plan: Discussed healthy diet and exercise guidelines    She reports that she has quit smoking. Her smoking use included cigarettes and cigars. She has never used smokeless tobacco.      Call or return to the clinic with any worsening of symptoms or no resolution. Patient/Parent verbalized understanding and is in agreement. Medication side effects reviewed.   Current Outpatient Medications   Medication Sig Dispense Refill     levonorgestrel (MIRENA) 20 MCG/24HR IUD 1 each (20 mcg) by Intrauterine route once for 1 dose 1 each 0     TYLENOL 325 MG OR TABS 2 TABLETS EVERY 4 HOURS AS NEEDED       Chart documentation with Dragon Voice recognition Software. Although reviewed after completion, some words and grammatical errors may remain.      JIMBO Diaz [facility]  "

## 2022-04-19 LAB
BKR LAB AP GYN ADEQUACY: NORMAL
BKR LAB AP GYN INTERPRETATION: NORMAL
BKR LAB AP HPV REFLEX: NORMAL
BKR LAB AP PREVIOUS ABNORMAL: NORMAL
PATH REPORT.COMMENTS IMP SPEC: NORMAL
PATH REPORT.COMMENTS IMP SPEC: NORMAL
PATH REPORT.RELEVANT HX SPEC: NORMAL

## 2022-04-21 LAB
HUMAN PAPILLOMA VIRUS 16 DNA: NEGATIVE
HUMAN PAPILLOMA VIRUS 18 DNA: NEGATIVE
HUMAN PAPILLOMA VIRUS FINAL DIAGNOSIS: NORMAL
HUMAN PAPILLOMA VIRUS OTHER HR: NEGATIVE

## 2022-04-22 ENCOUNTER — HOSPITAL ENCOUNTER (OUTPATIENT)
Dept: MAMMOGRAPHY | Facility: CLINIC | Age: 52
Discharge: HOME | End: 2022-04-22
Attending: NURSE PRACTITIONER | Admitting: NURSE PRACTITIONER
Payer: COMMERCIAL

## 2022-04-22 DIAGNOSIS — Z12.31 VISIT FOR SCREENING MAMMOGRAM: ICD-10-CM

## 2022-04-22 PROCEDURE — 77067 SCR MAMMO BI INCL CAD: CPT

## 2022-05-02 ENCOUNTER — HOSPITAL ENCOUNTER (OUTPATIENT)
Dept: MAMMOGRAPHY | Facility: CLINIC | Age: 52
Discharge: HOME | End: 2022-05-02
Attending: NURSE PRACTITIONER
Payer: COMMERCIAL

## 2022-05-02 DIAGNOSIS — R92.8 ABNORMAL MAMMOGRAM: ICD-10-CM

## 2022-05-02 PROCEDURE — 76642 ULTRASOUND BREAST LIMITED: CPT | Mod: RT

## 2022-05-02 PROCEDURE — 77061 BREAST TOMOSYNTHESIS UNI: CPT | Mod: RT

## 2022-05-03 ENCOUNTER — HOSPITAL ENCOUNTER (OUTPATIENT)
Dept: MAMMOGRAPHY | Facility: CLINIC | Age: 52
Discharge: HOME | End: 2022-05-03
Attending: NURSE PRACTITIONER
Payer: COMMERCIAL

## 2022-05-03 DIAGNOSIS — R92.8 ABNORMAL MAMMOGRAM: ICD-10-CM

## 2022-05-03 PROCEDURE — 88305 TISSUE EXAM BY PATHOLOGIST: CPT | Mod: TC | Performed by: NURSE PRACTITIONER

## 2022-05-03 PROCEDURE — 999N000065 MA POST PROCEDURE RIGHT

## 2022-05-03 PROCEDURE — 88377 M/PHMTRC ALYS ISHQUANT/SEMIQ: CPT | Performed by: NURSE PRACTITIONER

## 2022-05-03 PROCEDURE — A4648 IMPLANTABLE TISSUE MARKER: HCPCS

## 2022-05-03 PROCEDURE — 250N000009 HC RX 250: Performed by: NURSE PRACTITIONER

## 2022-05-03 PROCEDURE — 88291 CYTO/MOLECULAR REPORT: CPT | Performed by: MEDICAL GENETICS

## 2022-05-03 RX ADMIN — LIDOCAINE HYDROCHLORIDE 5 ML: 10 INJECTION, SOLUTION INFILTRATION; PERINEURAL at 09:10

## 2022-05-03 NOTE — DISCHARGE INSTRUCTIONS

## 2022-05-09 LAB
PATH REPORT.COMMENTS IMP SPEC: ABNORMAL
PATH REPORT.COMMENTS IMP SPEC: YES
PATH REPORT.FINAL DX SPEC: ABNORMAL
PATH REPORT.GROSS SPEC: ABNORMAL
PATH REPORT.MICROSCOPIC SPEC OTHER STN: ABNORMAL
PATH REPORT.MICROSCOPIC SPEC OTHER STN: ABNORMAL
PHOTO IMAGE: ABNORMAL

## 2022-05-09 PROCEDURE — 88341 IMHCHEM/IMCYTCHM EA ADD ANTB: CPT | Mod: 26 | Performed by: PATHOLOGY

## 2022-05-09 PROCEDURE — 88342 IMHCHEM/IMCYTCHM 1ST ANTB: CPT | Mod: 26 | Performed by: PATHOLOGY

## 2022-05-09 PROCEDURE — 88360 TUMOR IMMUNOHISTOCHEM/MANUAL: CPT | Mod: 26 | Performed by: PATHOLOGY

## 2022-05-09 PROCEDURE — 88305 TISSUE EXAM BY PATHOLOGIST: CPT | Mod: 26 | Performed by: PATHOLOGY

## 2022-05-10 ENCOUNTER — TELEPHONE (OUTPATIENT)
Dept: MAMMOGRAPHY | Facility: CLINIC | Age: 52
End: 2022-05-10
Payer: COMMERCIAL

## 2022-05-10 ENCOUNTER — PATIENT OUTREACH (OUTPATIENT)
Dept: ONCOLOGY | Facility: CLINIC | Age: 52
End: 2022-05-10
Payer: COMMERCIAL

## 2022-05-10 DIAGNOSIS — C50.111 MALIGNANT NEOPLASM OF CENTRAL PORTION OF RIGHT BREAST IN FEMALE, ESTROGEN RECEPTOR NEGATIVE (H): Primary | ICD-10-CM

## 2022-05-10 DIAGNOSIS — Z17.1 MALIGNANT NEOPLASM OF CENTRAL PORTION OF RIGHT BREAST IN FEMALE, ESTROGEN RECEPTOR NEGATIVE (H): Primary | ICD-10-CM

## 2022-05-10 NOTE — PROGRESS NOTES
New Patient Oncology Nurse Navigator Note     Referring provider: Janet Yap APRN CNP     Referring Clinic/Organization:  in  BREAST IMAGING    Referred to (specialty:) Medical Oncology (already scheduled to see Dr. Sigala for surgery consult      Date Referral Received: May 10, 2022     Evaluation for:  Breast cancer     Clinical History (per Nurse review of records provided):      Patient had a bilateral screening mammogram on 4/22/22 and an area of subtle possible developing architectural distortion in the central right breast, anterior to middle depth, is seen only on the craniocaudal view.  The left breast is normal-appearing.. A right breast diagnostic mammogram and ultrasound followed on 5/2.  The area of architectural distortion is redemonstrated in the retroareolar breast. There are no associated microcalcifications. Further evaluation with targeted ultrasound shows a corresponding hypoechoic lesion at the 12:00 position, 2 cm from the nipple, measuring 1.8 x 1.3 cm. Survey of the axilla shows no evidence of adenopathy.    5/3/22 -   Right breast, 12:00, 2.0 cm from nipple, ultrasound guided needle biopsy-  -Invasive mammary carcinoma, with features of Low-Grade Metaplastic Carcinoma, Fibromatosis Type. (please see comment)  -Ductal carcinoma insitu (DCIS), cribriform subtype, intermediate nuclear grade, associated with focal necrosis  -Estrogen receptor is negative (> 1%) and progesterone receptor is negative (<1%)  -Her 2 by FISH is ordered and pending and will be reported separately  Comment  Specimen shows DCIS associated with a Cytokeratin positive low-grade spindle cell proliferation, most compatible with the fibromatosis like variant of metaplastic carcinoma. Complete excision is recommended for final phenotyping. Estrogen and persistent receptors appear to be negative, however recommend repeating them on resection specimen.    Records Location: See Bookmarked material    Seeing   Jovon on 5/11 at 1:45pm.

## 2022-05-10 NOTE — TELEPHONE ENCOUNTER
I called patient and left her a voicemail message asking if she could return my call when available.    I am reaching out to patient to inform of US guided Right Breast needle biopsy(5/3/22) results, and the recommendations for follow up.    Aracely Mayer RN BSN  Breast Nurse Care Coordinator  [facility] [place] [place]  [phone]

## 2022-05-10 NOTE — TELEPHONE ENCOUNTER
I called patient again this afternoon and left a voicemail message asking if she could return my call when available.  Aracely Mayer, RN, BSN

## 2022-05-10 NOTE — PROGRESS NOTES
Malignant Path:  Pathology report reviewed with our breast radiologist Dr. Uriel Shipley, who confirmed the recent breast imaging is concordant with the final surgical pathology results below.    I phoned Ms. Risa Alcaraz, confirmed her full name, date of birth, and notified patient of Ultrasound Guided Right Breast Biopsy results showing Invasive Mammary Carcinoma, low grade.  Estrogen/ Progesterone Receptors are (-) negative, and HER2 is still pending.  Informed patient we will call her once results are available.    Patient states no problems with biopsy site.  Recommended follow up is Surgical and Medical Oncology Consult.     I offered to get patient scheduled for cons[facility] and patient states she would prefer to be seen at the [facility] location.  Surgical Consult has been arranged with Dr Daria Sigala on 5/11/22 at 1:30a.m. at [facility].  Patient has directions and phone numbers.    I placed order for referral to Medical Oncology and the Cancer Care Team will be reaching out to her to assist in getting her scheduled for Medical Oncology.    Questions were answered and I explained my role as Breast Care Nurse Coordinator in assisting her with appointments, resources and social support.  New diagnosis information packet will be available for patient at surgical consult.  I will follow up with the patient. She has my phone number if she has further questions.  Patient verbalized understanding and agrees with the plan of care.  Ordering provider- Carrie Yap CNP has been notified of the results, recommendations for follow up, and scheduled surgical consultation.  I will forward this note, along with the pathology results.    Aracely Mayer RN, BSN  Breast Care Nurse Coordinator  [facility] [place] [place]  [phone]        Risa Alcaraz [id number]  F, 1970  Surgical  Pathology Report (Final result) [id number]  Authorizing Provider: Janet Yap APRN CNP  Ordering Provider: Janet Yap APRN CNP  Ordering Location: [facility]  Collected: 05/03/2022 09:10 AM  Pathologist: Rupa Lew MD Received: 05/03/2022 10:49 AM  .  Specimens  A Breast, Right  .  .  Final Diagnosis  Right breast, 12:00, 2.0 cm from nipple, ultrasound guided needle biopsy-  -Invasive mammary carcinoma, with features of Low-Grade Metaplastic Carcinoma, Fibromatosis Type. (please  see comment)  -Ductal carcinoma insitu (DCIS), cribriform subtype, intermediate nuclear grade, associated with focal necrosis  -Estrogen receptor is negative (> 1%) and progesterone receptor is negative (<1%)  -Her 2 by FISH is ordered and pending and will be reported separately  Electronically signed by Rupa Lew MD on 5/9/2022 at 4:05 PM

## 2022-05-11 ENCOUNTER — OFFICE VISIT (OUTPATIENT)
Dept: SURGERY | Facility: CLINIC | Age: 52
End: 2022-05-11
Payer: COMMERCIAL

## 2022-05-11 VITALS — HEART RATE: 80 BPM | DIASTOLIC BLOOD PRESSURE: 80 MMHG | SYSTOLIC BLOOD PRESSURE: 120 MMHG

## 2022-05-11 DIAGNOSIS — Z17.1 MALIGNANT NEOPLASM OF CENTRAL PORTION OF RIGHT BREAST IN FEMALE, ESTROGEN RECEPTOR NEGATIVE (H): Primary | ICD-10-CM

## 2022-05-11 DIAGNOSIS — C50.111 MALIGNANT NEOPLASM OF CENTRAL PORTION OF RIGHT BREAST IN FEMALE, ESTROGEN RECEPTOR NEGATIVE (H): Primary | ICD-10-CM

## 2022-05-11 LAB — INTERPRETATION: NORMAL

## 2022-05-11 PROCEDURE — 99204 OFFICE O/P NEW MOD 45 MIN: CPT | Performed by: SURGERY

## 2022-05-11 NOTE — PROGRESS NOTES
Telephoned and spoke with patient to assure her writer is working on an medical oncology consult and she will hear back from me or our New Patient Schedulers today.  Shared my contact information in case she has any questions or would like an update. Nikki Qureshi RN

## 2022-05-11 NOTE — LETTER
May 12, 2022          Janet Yap, APRN CNP  [address] [place] [id number]      RE:   Risa Alcaraz 1970      Dear Colleague,    Thank you for referring your patient, Risa Alcaraz, to [facility]. Please see a copy of my visit note below.    Risa Alcaraz is a 51 year old female who is seen in consultation at the request of Dr. Yap for evaluation of right breast invasive mammary carcinoma.  The patient was undergoing her annual screening mammogram in April 2022 when she was noted to have possible architectural distortion at the central aspect of the right breast.  She subsequently underwent magnification views and right breast ultrasound.  This confirmed presence of a 1.8 cm lesion at the 12 o'clock position of the right breast, 2 cm from the nipple.  There was no evidence of axillary lymphadenopathy.  Ultrasound-guided biopsy was performed and this demonstrated invasive mammary carcinoma with features of low-grade metaplastic carcinoma, ER/CT negative, HER2 pending.  Patient reports that he has not noticed any breast masses or nipple discharge bilaterally.  She has noticed some recent left breast tenderness but nothing significant in the right breast.  Family history significant for a paternal aunt with breast cancer at an advanced age.  Patient began menarche around the age of 15.  She had her first child at age 32 and did breast-feed.  She is currently premenopausal and has an IUD in place.  She is due to have her IUD changed.  Current bra size is 38C.  Patient denies use of tobacco.     REVIEW OF SYSTEMS:  Constitutional: No fevers or chills  Eyes: No blurred or double vision  HENT: Denies headaches, No rhinorrhea, No sore throat  Respiratory: No cough or shortness of breath  Cardiovascular: Denies chest pain or palpitations  Gastrointestinal: No abdominal pain or nausea/vomiting  Genitourinary: No hematuria or dysuria  Musculoskeletal: Denies  arthralgias or myalgias  Neurologic: No numbness or tingling  Integumentary: No skin rashes     Past medical history:  -Assessed and noncontributory     Past Surgical History         Past Surgical History:   Procedure Laterality Date     [facility] ORAL SURGERY PROCEDURE         wisdom teeth, hypotension with anesthesia            Family History         Family History   Problem Relation Age of Onset     Diabetes Mother       Hypertension Mother       Cancer Father           penial     Hypertension Father       Cancer Maternal Grandmother           liver     Heart Disease Maternal Grandfather           MI     Cancer Paternal Grandmother           brain     Heart Disease Paternal Grandfather           MI            Social History            Tobacco Use     Smoking status: Former Smoker       Types: Cigarettes, Cigars     Smokeless tobacco: Never Used     Tobacco comment: Quit 1996   Substance Use Topics     Alcohol use: No       Comment: one or two beers before she knew she was preg.             Patient Active Problem List   Diagnosis     Allergic rhinitis     CARDIOVASCULAR SCREENING; LDL GOAL LESS THAN 160     Obesity     IUD (intrauterine device) in place         No Known Allergies     Current Outpatient Prescriptions          Current Outpatient Medications   Medication Sig Dispense Refill     TYLENOL 325 MG OR TABS 2 TABLETS EVERY 4 HOURS AS NEEDED         levonorgestrel (MIRENA) 20 MCG/24HR IUD 1 each (20 mcg) by Intrauterine route once for 1 dose 1 each 0            Vitals: /80   Pulse 80   BMI= There is no height or weight on file to calculate BMI.     EXAM:  General: Vital signs reviewed, in no apparent distress  Eyes: Anicteric  HENT: Normocephalic, atraumatic, trachea midline   Respiratory: Breathing nonlabored  Cardiovascular: Regular rate and rhythm  Breast: No obviously palpable breast masses bilaterally  Lymph: No palpable axillary lymphadenopathy bilaterally  Musculoskeletal: No gross  deformities  Neurologic: Grossly nonfocal exam  Psychiatric: Normal mood, affect and insight  Integumentary: Warm and dry     All labs and imaging personally reviewed and significant for:   SCREENING MAMMOGRAM, BILATERAL, DIGITAL w/CAD AND TOMOSYNTHESIS     4/22/2022 3:51 PM     BREAST DENSITY: Heterogeneously dense.     COMMENTS: An area of subtle possible developing architectural  distortion in the central right breast, anterior to middle depth, is  seen only on the craniocaudal view.  The left breast is  normal-appearing     MA DIAGNOSTIC RIGHT W ERLINDA,   US BREAST RIGHT LIMITED 1-3 QUADRANTS -  5/2/2022 3:14 PM     BREAST DENSITY: Heterogeneously dense.     FINDINGS:  Diagnostic views of the right breast were obtained,  including spot compression views with tomosynthesis. The area of  architectural distortion is redemonstrated in the retroareolar breast.  There are no associated microcalcifications.     Further evaluation with targeted ultrasound shows a corresponding  hypoechoic lesion at the 12:00 position, 2 cm from the nipple,  measuring 1.8 x 1.3 cm. Survey of the axilla shows no evidence of  Adenopathy.           Final Diagnosis   Right breast, 12:00, 2.0 cm from nipple, ultrasound guided needle biopsy-  -Invasive mammary carcinoma, with features of Low-Grade Metaplastic Carcinoma, Fibromatosis Type. (please see comment)  -Ductal carcinoma insitu (DCIS), cribriform subtype, intermediate nuclear grade, associated with focal necrosis  -Estrogen receptor is negative (> 1%) and progesterone receptor is negative (<1%)  -Her 2 by FISH is ordered and pending and will be reported separately            Electronically signed by Rupa Lew MD on 5/9/2022 at  4:05 PM   Comment     [facility]   Specimen shows DCIS associated with a Cytokeratin positive low-grade spindle cell proliferation, most compatible with the fibromatosis like variant of metaplastic carcinoma.  Complete excision is recommended for final  phenotyping.  Estrogen and persistent receptors appear to be negative, however recommend repeating them on resection specimen.             ASSESSMENT:  Risa Alcaraz is a 51 year old who presents with right breast invasive mammary carcinoma.  Significant pertinent co-morbidities include: Obesity.         PLAN:  A thorough discussion was had today in clinic regarding surgical options for the patient's right breast cancer.  Both lumpectomy and mastectomy and their risks and benefits were discussed.  [place] lymph node biopsy and its risks and benefits were discussed as well.  At this time, the patient reports that she is leaning towards bilateral mastectomy with immediate implant-based reconstruction.  Due to the patient's age and breast density, I have recommended proceeding with bilateral breast MRI to further evaluate extent of disease and distance of disease process from the nipple base.  Plastic surgery consult was placed today in clinic.  The patient is also interested in proceeding with genetic testing prior to surgical intervention.  Breast actionable panel was ordered today in clinic. In addition, the patient will be meeting with medical oncology next week.  I will await the oncologist's recommendation regarding timing of surgical intervention.           Again, thank you for allowing me to participate in the care of your patient.      Sincerely,      Daria Sigala MD

## 2022-05-11 NOTE — PROGRESS NOTES
[facility] General Surgery Clinic Consultation    CHIEF COMPLAINT:  Chief Complaint   Patient presents with     Consult     Invasive mammary carcinoma right breast        HISTORY OF PRESENT ILLNESS:  Risa Alcaraz is a 51 year old female who is seen in consultation at the request of Dr. Yap for evaluation of right breast invasive mammary carcinoma.  The patient was undergoing her annual screening mammogram in April 2022 when she was noted to have possible architectural distortion at the central aspect of the right breast.  She subsequently underwent magnification views and right breast ultrasound.  This confirmed presence of a 1.8 cm lesion at the 12 o'clock position of the right breast, 2 cm from the nipple.  There was no evidence of axillary lymphadenopathy.  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  Patient reports that he has not noticed any breast masses or nipple discharge bilaterally.  She has noticed some recent left breast tenderness but nothing significant in the right breast.  Family history significant for a paternal aunt with breast cancer at an advanced age.  Patient began menarche around the age of 15.  She had her first child at age 32 and did breast-feed.  She is currently premenopausal and has an IUD in place.  She is due to have her IUD changed.  Current bra size is 38C.  Patient denies use of tobacco.    REVIEW OF SYSTEMS:  Constitutional: No fevers or chills  Eyes: No blurred or double vision  HENT: Denies headaches, No rhinorrhea, No sore throat  Respiratory: No cough or shortness of breath  Cardiovascular: Denies chest pain or palpitations  Gastrointestinal: No abdominal pain or nausea/vomiting  Genitourinary: No hematuria or dysuria  Musculoskeletal: Denies arthralgias or myalgias  Neurologic: No numbness or tingling  Integumentary: No skin rashes    Past medical  history:  -Assessed and noncontributory    Past Surgical History:   Procedure Laterality Date     ZZC ORAL SURGERY PROCEDURE      wisdom teeth, hypotension with anesthesia       Family History   Problem Relation Age of Onset     Diabetes Mother      Hypertension Mother      Cancer Father         penial     Hypertension Father      Cancer Maternal Grandmother         liver     Heart Disease Maternal Grandfather         MI     Cancer Paternal Grandmother         brain     Heart Disease Paternal Grandfather         MI       Social History     Tobacco Use     Smoking status: Former Smoker     Types: Cigarettes, Cigars     Smokeless tobacco: Never Used     Tobacco comment: Quit 1996   Substance Use Topics     Alcohol use: No     Comment: one or two beers before she knew she was preg.       Patient Active Problem List   Diagnosis     Allergic rhinitis     CARDIOVASCULAR SCREENING; LDL GOAL LESS THAN 160     Obesity     IUD (intrauterine device) in place       No Known Allergies    Current Outpatient Medications   Medication Sig Dispense Refill     TYLENOL 325 MG OR TABS 2 TABLETS EVERY 4 HOURS AS NEEDED       levonorgestrel (MIRENA) 20 MCG/24HR IUD 1 each (20 mcg) by Intrauterine route once for 1 dose 1 each 0       Vitals: /80   Pulse 80   BMI= There is no height or weight on file to calculate BMI.    EXAM:  General: Vital signs reviewed, in no apparent distress  Eyes: Anicteric  HENT: Normocephalic, atraumatic, trachea midline   Respiratory: Breathing nonlabored  Cardiovascular: Regular rate and rhythm  Breast: No obviously palpable breast masses bilaterally  Lymph: No palpable axillary lymphadenopathy bilaterally  Musculoskeletal: No gross deformities  Neurologic: Grossly nonfocal exam  Psychiatric: Normal mood, affect and insight  Integumentary: Warm and dry    All labs and imaging personally reviewed and significant for:   SCREENING MAMMOGRAM, BILATERAL, DIGITAL w/CAD AND TOMOSYNTHESIS     4/22/2022 3:51  PM     BREAST DENSITY: Heterogeneously dense.     COMMENTS: An area of subtle possible developing architectural  distortion in the central right breast, anterior to middle depth, is  seen only on the craniocaudal view.  The left breast is  normal-appearing    MA DIAGNOSTIC RIGHT W ERLINDA,   US BREAST RIGHT LIMITED 1-3 QUADRANTS -  5/2/2022 3:14 PM     BREAST DENSITY: Heterogeneously dense.     FINDINGS:  Diagnostic views of the right breast were obtained,  including spot compression views with tomosynthesis. The area of  architectural distortion is redemonstrated in the retroareolar breast.  There are no associated microcalcifications.     Further evaluation with targeted ultrasound shows a corresponding  hypoechoic lesion at the 12:00 position, 2 cm from the nipple,  measuring 1.8 x 1.3 cm. Survey of the axilla shows no evidence of  Adenopathy.    Final Diagnosis   Right breast, 12:00, 2.0 cm from nipple, ultrasound guided needle biopsy-  -Invasive mammary carcinoma, with features of Low-Grade Metaplastic Carcinoma, Fibromatosis Type. (please see comment)  -Ductal carcinoma insitu (DCIS), cribriform subtype, intermediate nuclear grade, associated with focal necrosis  -Estrogen receptor is negative (> 1%) and progesterone receptor is negative (<1%)  -Her 2 by FISH is ordered and pending and will be reported separately            Electronically signed by Rupa Lew MD on 5/9/2022 at  4:05 PM   Comment   [facility]   Specimen shows DCIS associated with a Cytokeratin positive low-grade spindle cell proliferation, most compatible with the fibromatosis like variant of metaplastic carcinoma.  Complete excision is recommended for final phenotyping.  Estrogen and persistent receptors appear to be negative, however recommend repeating them on resection specimen.          ASSESSMENT:  Risa Alcaraz is a 51 year old who presents with right breast invasive mammary carcinoma.  Significant pertinent co-morbidities include:  Obesity.       PLAN:  A thorough discussion was had today in clinic regarding surgical options for the patient's right breast cancer.  Both lumpectomy and mastectomy and their risks and benefits were discussed.  [place] lymph node biopsy and its risks and benefits were discussed as well.  At this time, the patient reports that she is leaning towards bilateral mastectomy with immediate implant-based reconstruction.  Due to the patient's age and breast density, I have recommended proceeding with bilateral breast MRI to further evaluate extent of disease and distance of disease process from the nipple base.  Plastic surgery consult was placed today in clinic.  The patient is also interested in proceeding with genetic testing prior to surgical intervention.  Breast actionable panel was ordered today in clinic. In addition, the patient will be meeting with medical oncology next week.  I will await the oncologist's recommendation regarding timing of surgical intervention.     It was my pleasure to participate in the care of Risa Alcaraz in clinic today. Thank you for this consultation.         Daria Sigala MD    Please route or send letter to:  Primary Care Provider (PCP) and Referring Provider

## 2022-05-11 NOTE — NURSING NOTE
Breast Patients    BREAST PATIENTS (ALL)    1-Do you have any of the following symptoms? Lump(s) or Mass(es)  2-In which breast are you having the symptoms? right  3-Have you had a Mammogram? Other Location:  [facility]    -  Date:  4/22/22  4-Have you ever had a breast cyst drained? No  5-Have you ever had a breast biopsy? Yes:  Right  -  Date:  5/3/22  6-Have you ever had a Breast Cancer? No   7-Is there a history of Breast Cancer in your family? Yes   Relationship to you:    Aunt  8-Have you ever had Ovarian Cancer? No  9-Is there a history of Ovarian Cancer in your family? No  10-Summarize your caffeine intake (i.e. coffee, tea, chocolate, soda etc.): 2 cans of soda per day     BREAST PATIENTS (FEMALE)    11-What age did your periods begin? 15  12-Date your last menstrual period began? 5/1/22  13-Number of full-term pregnancies: 2  14-Your age when your first child was born? 32  15-Did you nurse your children? Yes  16-Are you pregnant now? No  17-Have you begun menopause? No  18-Have you had either ovary removed?No  19-Do you have breast implants? No   20-Do you use hormone replacement therapy?  No  21-Have you taken oral contraceptive pills?  No  22-Have you had an intrauterine device (IUD) placed?  Yes, For how many years?  6 years   23-What is your current bra size?  38C    Brittany Yap MA

## 2022-05-12 ENCOUNTER — TELEPHONE (OUTPATIENT)
Dept: MAMMOGRAPHY | Facility: CLINIC | Age: 52
End: 2022-05-12
Payer: COMMERCIAL

## 2022-05-12 NOTE — PROGRESS NOTES
RECORDS STATUS - ALL OTHER DIAGNOSIS      RECORDS RECEIVED FROM: [facility]   DATE RECEIVED: 5/18/2022   NOTES STATUS DETAILS   OFFICE NOTE from referring provider Complete Epic   ref by HOUSTON Yap   DISCHARGE SUMMARY from hospital     DISCHARGE REPORT from the ER     OPERATIVE REPORT Complete See Breast Biopsy in [facility] 5/3/2022    MEDICATION LIST Complete [facility]   CLINICAL TRIAL TREATMENTS TO DATE     LABS     PATHOLOGY REPORTS Complete 5/3/2022   Right breast, 12:00, 2.0 cm from nipple, ultrasound guided needle biopsy-  -Invasive mammary carcinoma   ANYTHING RELATED TO DIAGNOSIS Complete Her 2 [facility] Fish 5/3/2022   GENONOMIC TESTING     TYPE:     IMAGING (NEED IMAGES & REPORT)     CT SCANS     MRI Scheduled    MAMMO Complete 5/3/2022, 5/2/2022 more in PACS   ULTRASOUND Complete 5/3/2022 US Breast more in PACS   PET

## 2022-05-12 NOTE — TELEPHONE ENCOUNTER
I called Risa this morning, confirmed her full name, date of birth and informed her of Her2(-) negative results below.    Patient states she has a Lab Only appointment at the [facility] tomorrow morning for the Breast Action Panel Lab draw that Dr. Daria Sigala ordered during consult on 5/11/22.     Informed patient to call me back with any questions or concerns, otherwise I will reach out to her next week to check in on how her Medical Oncology Consult and Plastic Surgery Consult went. Patient verbalizes understanding and agrees with the plan of care.  Aracely Mayer RN BSN  Breast Nurse Care Coordinator  [facility] [place]  [phone]      Risa Alcaraz ALYSIA [id number]  F, 1970  HER 2 ALYCE FISH (Final result)    Interpretation  RESULTS:  Ratio of HER2/PATRICK-17 signals  Risa Alcaraz: 1.3 (MAGI Negative) Avg. number HER2 signals/nucleus: 3.2    INTERPRETATION: Group 5 (MAGI Negative)

## 2022-05-13 ENCOUNTER — LAB (OUTPATIENT)
Dept: LAB | Facility: CLINIC | Age: 52
End: 2022-05-13
Payer: COMMERCIAL

## 2022-05-13 DIAGNOSIS — C50.111 MALIGNANT NEOPLASM OF CENTRAL PORTION OF RIGHT BREAST IN FEMALE, ESTROGEN RECEPTOR NEGATIVE (H): ICD-10-CM

## 2022-05-13 DIAGNOSIS — Z00.00 ROUTINE GENERAL MEDICAL EXAMINATION AT A HEALTH CARE FACILITY: ICD-10-CM

## 2022-05-13 DIAGNOSIS — Z13.220 SCREENING FOR HYPERLIPIDEMIA: ICD-10-CM

## 2022-05-13 DIAGNOSIS — Z17.1 MALIGNANT NEOPLASM OF CENTRAL PORTION OF RIGHT BREAST IN FEMALE, ESTROGEN RECEPTOR NEGATIVE (H): ICD-10-CM

## 2022-05-13 DIAGNOSIS — Z97.5 IUD CONTRACEPTION: ICD-10-CM

## 2022-05-13 LAB
ANION GAP SERPL CALCULATED.3IONS-SCNC: 6 MMOL/L (ref 3–14)
BASOPHILS # BLD AUTO: 0.1 10E3/UL (ref 0–0.2)
BASOPHILS NFR BLD AUTO: 1 %
BUN SERPL-MCNC: 13 MG/DL (ref 7–30)
CALCIUM SERPL-MCNC: 8.7 MG/DL (ref 8.5–10.1)
CHLORIDE BLD-SCNC: 109 MMOL/L (ref 94–109)
CHOLEST SERPL-MCNC: 166 MG/DL
CO2 SERPL-SCNC: 23 MMOL/L (ref 20–32)
CREAT SERPL-MCNC: 0.98 MG/DL (ref 0.52–1.04)
EOSINOPHIL # BLD AUTO: 0.3 10E3/UL (ref 0–0.7)
EOSINOPHIL NFR BLD AUTO: 5 %
ERYTHROCYTE [DISTWIDTH] IN BLOOD BY AUTOMATED COUNT: 13 % (ref 10–15)
FASTING STATUS PATIENT QL REPORTED: NO
GFR SERPL CREATININE-BSD FRML MDRD: 70 ML/MIN/1.73M2
GLUCOSE BLD-MCNC: 101 MG/DL (ref 70–99)
HCT VFR BLD AUTO: 40.3 % (ref 35–47)
HDLC SERPL-MCNC: 96 MG/DL
HGB BLD-MCNC: 13.2 G/DL (ref 11.7–15.7)
IMM GRANULOCYTES # BLD: 0 10E3/UL
IMM GRANULOCYTES NFR BLD: 0 %
INTERPRETATION: NORMAL
INTERPRETATION: NORMAL
LDLC SERPL CALC-MCNC: 44 MG/DL
LYMPHOCYTES # BLD AUTO: 1.4 10E3/UL (ref 0.8–5.3)
LYMPHOCYTES NFR BLD AUTO: 23 %
MCH RBC QN AUTO: 33.3 PG (ref 26.5–33)
MCHC RBC AUTO-ENTMCNC: 32.8 G/DL (ref 31.5–36.5)
MCV RBC AUTO: 102 FL (ref 78–100)
MONOCYTES # BLD AUTO: 0.4 10E3/UL (ref 0–1.3)
MONOCYTES NFR BLD AUTO: 7 %
NEUTROPHILS # BLD AUTO: 4 10E3/UL (ref 1.6–8.3)
NEUTROPHILS NFR BLD AUTO: 64 %
NONHDLC SERPL-MCNC: 70 MG/DL
PLATELET # BLD AUTO: 229 10E3/UL (ref 150–450)
POTASSIUM BLD-SCNC: 4.5 MMOL/L (ref 3.4–5.3)
RBC # BLD AUTO: 3.96 10E6/UL (ref 3.8–5.2)
SIGNIFICANT RESULTS: NORMAL
SIGNIFICANT RESULTS: NORMAL
SODIUM SERPL-SCNC: 138 MMOL/L (ref 133–144)
SPECIMEN DESCRIPTION: NORMAL
SPECIMEN DESCRIPTION: NORMAL
TEST DETAILS, MDL: NORMAL
TEST DETAILS, MDL: NORMAL
TRIGL SERPL-MCNC: 128 MG/DL
WBC # BLD AUTO: 6.2 10E3/UL (ref 4–11)

## 2022-05-13 PROCEDURE — 80061 LIPID PANEL: CPT

## 2022-05-13 PROCEDURE — 80048 BASIC METABOLIC PNL TOTAL CA: CPT

## 2022-05-13 PROCEDURE — 84460 ALANINE AMINO (ALT) (SGPT): CPT

## 2022-05-13 PROCEDURE — 85025 COMPLETE CBC W/AUTO DIFF WBC: CPT

## 2022-05-13 PROCEDURE — 84075 ASSAY ALKALINE PHOSPHATASE: CPT

## 2022-05-13 PROCEDURE — 84450 TRANSFERASE (AST) (SGOT): CPT

## 2022-05-13 PROCEDURE — 36415 COLL VENOUS BLD VENIPUNCTURE: CPT

## 2022-05-13 NOTE — PROGRESS NOTES
[facility]    Hematology/Oncology New Patient Note      Today's Date: 05/18/22    Reason for Consult: Right breast cancer.    HISTORY OF PRESENT ILLNESS: Risa Alcaraz is a 51 year old female who presents with the following oncologic history:  1.  4/22/2022: Mammogram showed distortion in the central right breast.  Left breast is negative.  2.  5/2/2022: Diagnostic right mammogram showed distortion in the retroareolar breast.  Targeted ultrasound of the right breast at 12:00, 2 cm from the nipple measured 1.8 x 1.3 cm.  Survey of axilla showed no evidence of adenopathy.  3.  5/3/2022: Ultrasound-guided needle biopsy of the right breast at 12:00 showed invasive mammary carcinoma with features of low-grade metaplastic carcinoma, fibromatosis type, associated DCIS, cribriform subtype, intermediate nuclear grade, ER negative, ME negative, HER2/radha FISH negative.    Risa reports having initially had left breast discomfort. She has a Mirena IUD that is 1 year overdue for removal.    No family history of breast, ovarian, or prostate cancer.    REVIEW OF SYSTEMS:   14 point ROS was reviewed and is negative other than as noted above in HPI.       HOME MEDICATIONS:  Current Outpatient Medications   Medication Sig Dispense Refill     levonorgestrel (MIRENA) 20 MCG/24HR IUD 1 each (20 mcg) by Intrauterine route once for 1 dose 1 each 0     multivitamin w/minerals (MULTI-VITAMIN) tablet Take 1 tablet by mouth daily           ALLERGIES:  No Known Allergies      PAST MEDICAL HISTORY:  Past Medical History:   Diagnosis Date     NO ACTIVE PROBLEMS          PAST SURGICAL HISTORY:  Past Surgical History:   Procedure Laterality Date     [facility] ORAL SURGERY PROCEDURE      wisdom teeth, hypotension with anesthesia         SOCIAL HISTORY:  Social History     Socioeconomic History     Marital status:      Spouse name: Not on file     Number of children: Not on file     Years of education: Not on file     Highest  education level: Not on file   Occupational History     Employer: [employer]   Tobacco Use     Smoking status: Former Smoker     Types: Cigarettes, Cigars     Smokeless tobacco: Never Used     Tobacco comment: Quit    Substance and Sexual Activity     Alcohol use: No     Comment: one or two beers before she knew she was preg.     Drug use: No     Sexual activity: Yes     Partners: Male     Birth control/protection: I.U.D.     Comment: mirena    Other Topics Concern     Parent/sibling w/ CABG, MI or angioplasty before 65F 55M? Not Asked   Social History Narrative     Not on file     Social Determinants of Health     Financial Resource Strain: Not on file   Food Insecurity: Not on file   Transportation Needs: Not on file   Physical Activity: Not on file   Stress: Not on file   Social Connections: Not on file   Intimate Partner Violence: Not on file   Housing Stability: Not on file         FAMILY HISTORY:  Family History   Problem Relation Age of Onset     Diabetes Mother      Hypertension Mother      Cancer Father         penial     Hypertension Father      Cancer Maternal Grandmother         liver     Heart Disease Maternal Grandfather         MI     Cancer Paternal Grandmother         brain     Heart Disease Paternal Grandfather         MI         PHYSICAL EXAM:  Vital signs:  /83   Pulse 70   Resp 16   Wt 95.7 kg (211 lb)   SpO2 98%   BMI 38.28 kg/m     ECO  GENERAL/CONSTITUTIONAL: No acute distress.  EYES:  No scleral icterus.  ENT/MOUTH: Neck supple. Mask in place.  LYMPH: No cervical, supraclavicular, axillary adenopathy.   BREAST: Right breast with non-discrete palpable mass/tissue thickening in the central area, above nipple approximately 2 cm.  No palpable masses in the left breast.  Nipples are everted bilaterally with no discharge.  No erythema or ulceration.  RESPIRATORY: Clear to auscultation bilaterally. No crackles or wheezing.   CARDIOVASCULAR: Regular rate and rhythm without  murmurs.  GASTROINTESTINAL: No hepatosplenomegaly, masses, or tenderness.  No guarding.  No distention.  MUSCULOSKELETAL: Warm and well-perfused, no cyanosis, clubbing, or edema.  NEUROLOGIC: No focal motor deficits. Alert, oriented, answers questions appropriately.  INTEGUMENTARY: No rashes or jaundice.  GAIT: Steady, does not use assistive device      LABS:  CBC RESULTS:   Recent Labs   Lab Test 05/13/22  1016   WBC 6.2   RBC 3.96   HGB 13.2   HCT 40.3   *   MCH 33.3*   MCHC 32.8   RDW 13.0          Recent Labs   Lab Test 05/13/22  1016 01/30/15  0852     --    POTASSIUM 4.5  --    CHLORIDE 109  --    GLC  --  95         PATHOLOGY:  Reviewed as per HPI.    IMAGING:  Reviewed as per HPI.    ASSESSMENT/PLAN:  Risa Alcaraz is a 51 year old female with the following issues:  1.  Clinical prognostic stage IB, [id number], invasive mammary carcinoma with features of low-grade metaplastic carcinoma, fibromatosis type, ER negative, NH negative, HER2/radha FISH negative (triple negative)  - I discussed with Risa that her breast imaging so far shows an early stage breast cancer with no obvious lymph node involvement but with a clinically more aggressive type of breast cancer given the triple negative status and metaplastic features.  - She is scheduled for a breast MRI on 5/24/2022 to assess for any additional extensive disease.  I explained that if the MRI shows potential letha involvement (N1-N2 disease) and/or larger primary tumor extent (T2--T4), that I would typically recommend neoadjuvant chemotherapy with immunotherapy as per the KEYNOTE-422 study, comprised of Taxol and carboplatin weekly for 12 weeks with the addition of pembrolizumab (Keytruda) every 3 weeks, followed by Adriamycin and Cytoxan every 3 weeks for 4 cycles combined with Keytruda. Discussed how pathologic response relates to recurrence rates and overall survival for triple negative breast cancer.  --However, I did discuss that  metaplastic cancers tend to be resistant to chemotherapy (although sometimes responsive to immunotherapy) and that if her breast MRI does not show more extensive disease, that she has option of surgery upfront.  Even if she were to proceed with neoadjuvant chemotherapy, we would monitor the primary tumor carefully with serial breast ultrasound measurements.  - If she were to proceed with surgery upfront with Dr. Daria Sigala, then I would recommend adjuvant chemotherapy with dose dense Adriamycin and Cytoxan every 2 weeks for 4 cycles followed by Taxol weekly for 12 weeks to maximally reduce her risk of breast cancer recurrence.  - Given that she has triple negative breast cancer and features of metaplastic carcinoma, I recommended CT chest/abdomen/pelvis and a bone scan for staging.  --Will arrange for prechemotherapy echocardiogram.  -She had baseline blood work done on 5/13/2022 which showed a normal complete blood count and basic metabolic panel.  --I recommended removal of her Mirena IUD and not replace with any hormone-based IUD (she can use copper IUD).  --She already met with Dr. Ladd for reconstructive discussion.    Briseyda Mars MD  Hematology/Oncology  [facility]    Total time spent: 70 minutes in patient evaluation, counseling, documentation, and coordination of care.

## 2022-05-18 ENCOUNTER — PRE VISIT (OUTPATIENT)
Dept: ONCOLOGY | Facility: CLINIC | Age: 52
End: 2022-05-18

## 2022-05-18 ENCOUNTER — ONCOLOGY VISIT (OUTPATIENT)
Dept: ONCOLOGY | Facility: CLINIC | Age: 52
End: 2022-05-18
Attending: NURSE PRACTITIONER
Payer: COMMERCIAL

## 2022-05-18 VITALS
WEIGHT: 211 LBS | BODY MASS INDEX: 38.28 KG/M2 | DIASTOLIC BLOOD PRESSURE: 83 MMHG | OXYGEN SATURATION: 98 % | HEART RATE: 70 BPM | SYSTOLIC BLOOD PRESSURE: 135 MMHG | RESPIRATION RATE: 16 BRPM

## 2022-05-18 DIAGNOSIS — Z01.818 ENCOUNTER FOR OTHER PREPROCEDURAL EXAMINATION: ICD-10-CM

## 2022-05-18 DIAGNOSIS — Z17.1 MALIGNANT NEOPLASM OF CENTRAL PORTION OF RIGHT BREAST IN FEMALE, ESTROGEN RECEPTOR NEGATIVE (H): Primary | ICD-10-CM

## 2022-05-18 DIAGNOSIS — C50.111 MALIGNANT NEOPLASM OF CENTRAL PORTION OF RIGHT BREAST IN FEMALE, ESTROGEN RECEPTOR NEGATIVE (H): Primary | ICD-10-CM

## 2022-05-18 LAB
ALP SERPL-CCNC: 79 U/L (ref 40–150)
ALT SERPL W P-5'-P-CCNC: 20 U/L (ref 0–50)
AST SERPL W P-5'-P-CCNC: 17 U/L (ref 0–45)
INTERPRETATION: NORMAL

## 2022-05-18 PROCEDURE — 99205 OFFICE O/P NEW HI 60 MIN: CPT | Performed by: INTERNAL MEDICINE

## 2022-05-18 PROCEDURE — G0463 HOSPITAL OUTPT CLINIC VISIT: HCPCS

## 2022-05-18 RX ORDER — MULTIPLE VITAMINS W/ MINERALS TAB 9MG-400MCG
1 TAB ORAL DAILY
COMMUNITY

## 2022-05-18 ASSESSMENT — PAIN SCALES - GENERAL: PAINLEVEL: NO PAIN (0)

## 2022-05-18 NOTE — LETTER
5/18/2022         RE: Risa Alcaraz  [address] [facility] [address]        Dear Colleague,    Thank you for referring your patient, Risa Alcaraz, to the [facility]. Please see a copy of my visit note below.    [facility]    Hematology/Oncology New Patient Note      Today's Date: 05/18/22    Reason for Consult: Right breast cancer.    HISTORY OF PRESENT ILLNESS: Risa Alcaraz is a 51 year old female who presents with the following oncologic history:  1.  4/22/2022: Mammogram showed distortion in the central right breast.  Left breast is negative.  2.  5/2/2022: Diagnostic right mammogram showed distortion in the retroareolar breast.  Targeted ultrasound of the right breast at 12:00, 2 cm from the nipple measured 1.8 x 1.3 cm.  Survey of axilla showed no evidence of adenopathy.  3.  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    Risa reports having initially had left breast discomfort. She has a Mirena IUD that is 1 year overdue for removal.    No family history of breast, ovarian, or prostate cancer.    REVIEW OF SYSTEMS:   14 point ROS was reviewed and is negative other than as noted above in HPI.       HOME MEDICATIONS:  Current Outpatient Medications   Medication Sig Dispense Refill     levonorgestrel (MIRENA) 20 MCG/24HR IUD 1 each (20 mcg) by Intrauterine route once for 1 dose 1 each 0     multivitamin w/minerals (MULTI-VITAMIN) tablet Take 1 tablet by mouth daily           ALLERGIES:  No Known Allergies      PAST MEDICAL HISTORY:  Past Medical History:   Diagnosis Date     NO ACTIVE PROBLEMS          PAST SURGICAL HISTORY:  Past Surgical History:   Procedure Laterality Date     [facility] ORAL SURGERY PROCEDURE       wisdom teeth, hypotension with anesthesia         SOCIAL HISTORY:  Social History     Socioeconomic History     Marital status:      Spouse name: Not on file     Number of children: Not on file     Years of education: Not on file     Highest education level: Not on file   Occupational History     Employer: [employer]   Tobacco Use     Smoking status: Former Smoker     Types: Cigarettes, Cigars     Smokeless tobacco: Never Used     Tobacco comment: Quit    Substance and Sexual Activity     Alcohol use: No     Comment: one or two beers before she knew she was preg.     Drug use: No     Sexual activity: Yes     Partners: Male     Birth control/protection: I.U.D.     Comment: mirena    Other Topics Concern     Parent/sibling w/ CABG, MI or angioplasty before 65F 55M? Not Asked   Social History Narrative     Not on file     Social Determinants of Health     Financial Resource Strain: Not on file   Food Insecurity: Not on file   Transportation Needs: Not on file   Physical Activity: Not on file   Stress: Not on file   Social Connections: Not on file   Intimate Partner Violence: Not on file   Housing Stability: Not on file         FAMILY HISTORY:  Family History   Problem Relation Age of Onset     Diabetes Mother      Hypertension Mother      Cancer Father         penial     Hypertension Father      Cancer Maternal Grandmother         liver     Heart Disease Maternal Grandfather         MI     Cancer Paternal Grandmother         brain     Heart Disease Paternal Grandfather         MI         PHYSICAL EXAM:  Vital signs:  /83   Pulse 70   Resp 16   Wt 95.7 kg (211 lb)   SpO2 98%   BMI 38.28 kg/m     ECO  GENERAL/CONSTITUTIONAL: No acute distress.  EYES:  No scleral icterus.  ENT/MOUTH: Neck supple. Mask in place.  LYMPH: No cervical, supraclavicular, axillary adenopathy.   BREAST: Right breast with non-discrete palpable mass/tissue thickening in the central area, above nipple approximately 2  cm.  No palpable masses in the left breast.  Nipples are everted bilaterally with no discharge.  No erythema or ulceration.  RESPIRATORY: Clear to auscultation bilaterally. No crackles or wheezing.   CARDIOVASCULAR: Regular rate and rhythm without murmurs.  GASTROINTESTINAL: No hepatosplenomegaly, masses, or tenderness.  No guarding.  No distention.  MUSCULOSKELETAL: Warm and well-perfused, no cyanosis, clubbing, or edema.  NEUROLOGIC: No focal motor deficits. Alert, oriented, answers questions appropriately.  INTEGUMENTARY: No rashes or jaundice.  GAIT: Steady, does not use assistive device      LABS:  CBC RESULTS:   Recent Labs   Lab Test 05/13/22  1016   WBC 6.2   RBC 3.96   HGB 13.2   HCT 40.3   *   MCH 33.3*   MCHC 32.8   RDW 13.0          Recent Labs   Lab Test 05/13/22  1016 01/30/15  0852     --    POTASSIUM 4.5  --    CHLORIDE 109  --    GLC  --  95         PATHOLOGY:  Reviewed as per HPI.    IMAGING:  Reviewed as per HPI.    ASSESSMENT/PLAN:  Risa Alcaraz is a 51 year old female with the following issues:  1.  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  - She is scheduled for a breast MRI on 5/24/2022 to assess for any additional extensive disease.  I explained that if the MRI shows potential letha involvement (N1-N2 disease) and/or larger primary tumor extent (T2--T4), that I would typically recommend neoadjuvant chemotherapy with immunotherapy as per the KEYNOTE-422 study, comprised of Taxol and carboplatin weekly for 12 weeks with the addition of pembrolizumab (Keytruda) every  3 weeks, followed by Adriamycin and Cytoxan every 3 weeks for 4 cycles combined with Keytruda. Discussed how pathologic response relates to recurrence rates and overall survival for triple negative breast cancer.  --However, I did discuss that metaplastic cancers tend to be resistant to chemotherapy (although sometimes responsive to immunotherapy) and that if her breast MRI does not show more extensive disease, that she has option of surgery upfront.  Even if she were to proceed with neoadjuvant chemotherapy, we would monitor the primary tumor carefully with serial breast ultrasound measurements.  - If she were to proceed with surgery upfront with Dr. Daria Sigala, then I would recommend adjuvant chemotherapy with dose dense Adriamycin and Cytoxan every 2 weeks for 4 cycles followed by Taxol weekly for 12 weeks to maximally reduce her risk of breast cancer recurrence.  - Given that she has triple negative breast cancer and features of metaplastic carcinoma, I recommended CT chest/abdomen/pelvis and a bone scan for staging.  --Will arrange for prechemotherapy echocardiogram.  -She had baseline blood work done on 5/13/2022 which showed a normal complete blood count and basic metabolic panel.  --I recommended removal of her Mirena IUD and not replace with any hormone-based IUD (she can use copper IUD).  --She already met with Dr. Ladd for reconstructive discussion.    Briseyda Mars MD  Hematology/Oncology  [facility]    Total time spent: 70 minutes in patient evaluation, counseling, documentation, and coordination of care.       Again, thank you for allowing me to participate in the care of your patient.        Sincerely,        Briseyda Mars MD

## 2022-05-20 ENCOUNTER — TELEPHONE (OUTPATIENT)
Dept: MAMMOGRAPHY | Facility: CLINIC | Age: 52
End: 2022-05-20
Payer: COMMERCIAL

## 2022-05-20 ENCOUNTER — TELEPHONE (OUTPATIENT)
Dept: FAMILY MEDICINE | Facility: CLINIC | Age: 52
End: 2022-05-20
Payer: COMMERCIAL

## 2022-05-20 DIAGNOSIS — C50.919 METAPLASTIC CARCINOMA OF BREAST (H): Primary | ICD-10-CM

## 2022-05-20 NOTE — TELEPHONE ENCOUNTER
Breast Care Nurse Coordination:  Follow up call placed to patient today to assess her needs and check in as to whether she has any questions regarding her recent surgical consultation.    Risa was recently diagnosed with Right breast cancer and had a surgical consultation with Dr. Daria Sigala on 5/11/22.  Breast Action Panel ordered 5/12/22 is still pending.    Patient states she met with Dr. Mars on 5/18/22, and Dr. Mars is recommending neoadjuvent chemotherapy.  Patient states she is scheduled for bilateral breast MRI & Echocardiogram(5/24/22), CT- C/A/P & Bone Scan(5/25/22).      Risa states she has decided she would like to proceed with surgery first and do chemotherapy after.  Patient mentioned she also met with Dr. Ladd(5/18/22) and is leaning towards having tissue flap based reconstruction with her bilateral mastectomy, right sentinel lymph node biopsy.  Patient is aware she will also have a port placed at the time of surgery.  Informed patient I will fax over Rx: Post Mastectomy Garment to [facility]'s, and advised she call them to schedule a bra-fitting appointment once she has surgery date scheduled.    Reviewed education, plan of care and scheduled appointments.     Informed patient Dr. Sigala and Dr. Mars will be reaching out to her after imaging results to inform and discuss the plan of care.  Informed patient to call us back with questions or concerns.  Patient verbalized understanding and agrees with the plan of care.     Aracely Mayer, RN BSN  Breast Care Nurse Coordinator  [facility] [place]  [phone]

## 2022-05-20 NOTE — TELEPHONE ENCOUNTER
Reason for Call:  Other    Detailed comments:   1. Pt is going to be having surgery for breast cancer (she will not know when the surgery is scheduled for for another 2 weeks) and her employment is needing her to fill out FMLA and short term disability paperwork. Pt is wondering how she goes about doing this, would the paperwork go to Janet Yap first or to the surgeon's office?    2. Pt was also discussing with Janet Yap about IUD removal or replacement and pt found out it can be replaced, but it needs to be a copper non hormonal IUD. Wondering on the process of that as well.    3. Also has questions on pre-op appointment.    Phone Number Patient can be reached at: Home number on file [phone] (home)    Best Time: anytime    Can we leave a detailed message on this number? YES    Call taken on 5/20/2022 at 4:01 PM by Kerrie Shetty

## 2022-05-23 NOTE — TELEPHONE ENCOUNTER
Re: FMLA, short term disability pt will contact breast care nurse coordinator for appropriate provider (onc or surg) to complete forms and to initiate process.     Re: IUD replacement, pt referred to OB/gyn per 04-14-22 OV with Janet. States was told needs to be copper hormone- free IUD Paragard which she has had in past.   Waiting for PCP to call her back to see if can be replaced in clinic or need OB/gyn.  Forwarded to Dr. Whitehead for review, recommendation.  If can be done in clinic, are you able to work in schedule?  NARDA Miranda, RN

## 2022-05-24 ENCOUNTER — HOSPITAL ENCOUNTER (OUTPATIENT)
Dept: CARDIOLOGY | Facility: CLINIC | Age: 52
Discharge: HOME | End: 2022-05-24
Attending: INTERNAL MEDICINE
Payer: COMMERCIAL

## 2022-05-24 ENCOUNTER — TELEPHONE (OUTPATIENT)
Dept: SURGERY | Facility: CLINIC | Age: 52
End: 2022-05-24

## 2022-05-24 ENCOUNTER — HOSPITAL ENCOUNTER (OUTPATIENT)
Dept: MRI IMAGING | Facility: CLINIC | Age: 52
Discharge: HOME | End: 2022-05-24
Attending: SURGERY
Payer: COMMERCIAL

## 2022-05-24 DIAGNOSIS — C50.111 MALIGNANT NEOPLASM OF CENTRAL PORTION OF RIGHT BREAST IN FEMALE, ESTROGEN RECEPTOR NEGATIVE (H): ICD-10-CM

## 2022-05-24 DIAGNOSIS — Z17.1 MALIGNANT NEOPLASM OF CENTRAL PORTION OF RIGHT BREAST IN FEMALE, ESTROGEN RECEPTOR NEGATIVE (H): ICD-10-CM

## 2022-05-24 DIAGNOSIS — Z01.818 ENCOUNTER FOR OTHER PREPROCEDURAL EXAMINATION: ICD-10-CM

## 2022-05-24 LAB — LVEF ECHO: NORMAL

## 2022-05-24 PROCEDURE — 999N000208 ECHOCARDIOGRAM COMPLETE

## 2022-05-24 PROCEDURE — 255N000002 HC RX 255 OP 636: Performed by: INTERNAL MEDICINE

## 2022-05-24 PROCEDURE — 255N000002 HC RX 255 OP 636: Performed by: SURGERY

## 2022-05-24 PROCEDURE — 93306 TTE W/DOPPLER COMPLETE: CPT | Mod: 26 | Performed by: INTERNAL MEDICINE

## 2022-05-24 PROCEDURE — 77049 MRI BREAST C-+ W/CAD BI: CPT

## 2022-05-24 PROCEDURE — A9585 GADOBUTROL INJECTION: HCPCS | Performed by: SURGERY

## 2022-05-24 RX ORDER — GADOBUTROL 604.72 MG/ML
10 INJECTION INTRAVENOUS ONCE
Status: COMPLETED | OUTPATIENT
Start: 2022-05-24 | End: 2022-05-24

## 2022-05-24 RX ADMIN — HUMAN ALBUMIN MICROSPHERES AND PERFLUTREN 9 ML: 10; .22 INJECTION, SOLUTION INTRAVENOUS at 13:28

## 2022-05-24 RX ADMIN — GADOBUTROL 10 ML: 604.72 INJECTION INTRAVENOUS at 08:04

## 2022-05-25 ENCOUNTER — TELEPHONE (OUTPATIENT)
Dept: SURGERY | Facility: CLINIC | Age: 52
End: 2022-05-25
Payer: COMMERCIAL

## 2022-05-25 ENCOUNTER — HOSPITAL ENCOUNTER (OUTPATIENT)
Dept: NUCLEAR MEDICINE | Facility: CLINIC | Age: 52
Setting detail: NUCLEAR MEDICINE
Discharge: HOME | End: 2022-05-25
Attending: INTERNAL MEDICINE
Payer: COMMERCIAL

## 2022-05-25 ENCOUNTER — HOSPITAL ENCOUNTER (OUTPATIENT)
Dept: CT IMAGING | Facility: CLINIC | Age: 52
Discharge: HOME | End: 2022-05-25
Attending: INTERNAL MEDICINE
Payer: COMMERCIAL

## 2022-05-25 DIAGNOSIS — C50.919 METAPLASTIC CARCINOMA OF BREAST (H): Primary | ICD-10-CM

## 2022-05-25 DIAGNOSIS — C50.111 MALIGNANT NEOPLASM OF CENTRAL PORTION OF RIGHT BREAST IN FEMALE, ESTROGEN RECEPTOR NEGATIVE (H): ICD-10-CM

## 2022-05-25 DIAGNOSIS — Z17.1 MALIGNANT NEOPLASM OF CENTRAL PORTION OF RIGHT BREAST IN FEMALE, ESTROGEN RECEPTOR NEGATIVE (H): ICD-10-CM

## 2022-05-25 DIAGNOSIS — N63.20 BREAST MASS, LEFT: Primary | ICD-10-CM

## 2022-05-25 PROCEDURE — 343N000001 HC RX 343: Performed by: INTERNAL MEDICINE

## 2022-05-25 PROCEDURE — A9503 TC99M MEDRONATE: HCPCS | Performed by: INTERNAL MEDICINE

## 2022-05-25 PROCEDURE — 74177 CT ABD & PELVIS W/CONTRAST: CPT

## 2022-05-25 PROCEDURE — 250N000011 HC RX IP 250 OP 636: Performed by: INTERNAL MEDICINE

## 2022-05-25 PROCEDURE — 250N000009 HC RX 250: Performed by: INTERNAL MEDICINE

## 2022-05-25 PROCEDURE — 78306 BONE IMAGING WHOLE BODY: CPT

## 2022-05-25 RX ORDER — TC 99M MEDRONATE 20 MG/10ML
25 INJECTION, POWDER, LYOPHILIZED, FOR SOLUTION INTRAVENOUS ONCE
Status: COMPLETED | OUTPATIENT
Start: 2022-05-25 | End: 2022-05-25

## 2022-05-25 RX ORDER — IOPAMIDOL 755 MG/ML
104 INJECTION, SOLUTION INTRAVASCULAR ONCE
Status: COMPLETED | OUTPATIENT
Start: 2022-05-25 | End: 2022-05-25

## 2022-05-25 RX ADMIN — SODIUM CHLORIDE 70 ML: 9 INJECTION, SOLUTION INTRAVENOUS at 08:11

## 2022-05-25 RX ADMIN — TC 99M MEDRONATE 26 MCI.: 20 INJECTION, POWDER, LYOPHILIZED, FOR SOLUTION INTRAVENOUS at 08:05

## 2022-05-25 RX ADMIN — IOPAMIDOL 104 ML: 755 INJECTION, SOLUTION INTRAVENOUS at 08:11

## 2022-05-25 NOTE — PROGRESS NOTES
[facility] Surgery:    Patient contacted and bilateral breast MRI results discussed.  Due to new finding of nodular non-mass enhancement at the 12 o'clock position of the left breast, I have recommended proceeding with left breast ultrasound and possible biopsy.  I will plan to contact the patient directly once the imaging/biopsy results are available.    MRI BREASTS, BILATERAL, ENHANCED - 5/24/2022    FINDINGS:      Right Breast: Mild background parenchymal enhancement. There is  nonmass enhancement at the 12:00 position of the right breast  corresponding to the area of biopsy-proven malignancy. Maximum  dimensions are 4.3 cm AP by 2.3 cm transverse by 3.2 cm craniocaudal.  No other suspicious right breast enhancement. No evidence of axillary  or internal mammary adenopathy.      Left Breast: Mild background parenchymal enhancement. There is  discontinuous nodular nonmass enhancement at the 12:00 position  measuring approximately 7.7 cm AP by 3.5 cm transverse by 3.3 cm  craniocaudal. Within this enhancement there are areas of rapid uptake  with washout kinetics, similar to the contralateral known malignancy.  No other suspicious left breast enhancement. No evidence of left  axillary or internal mammary adenopathy.                                                                      IMPRESSION: BI-RADS CATEGORY: 6 - Known Biopsy-Proven Malignancy     1. Nonmass enhancement at the 12:00 position of the RIGHT breast  corresponds to biopsy-proven malignancy and measures 4.3 x 2.3 x 3.2  cm. No other suspicious right breast enhancement.     2. Nodular nonmass enhancement at the 12:00 position of the LEFT  breast. Further evaluation with targeted ultrasound is recommended. If  an ultrasound correlate cannot be identified, MRI guided core biopsy  would be recommended.     3. No evidence of adenopathy.    Daria Sigala MD

## 2022-05-25 NOTE — Clinical Note
Just to keep you both in the loop!  Briseyda- I will reach out to you prior to contacting the patient once I have the imaging/biopsy results.

## 2022-05-25 NOTE — TELEPHONE ENCOUNTER
Type of surgery: bilateral skin sparing mastectomies, right axillary SLN biopsy, possible limited right axillary dissection, port placement, combined with Dr Ladd bilateral TE  [facility] of surgery: [facility]  Date and time of surgery: 6/15/22 12:30pm  Surgeon: Dr Sigala  Pre-Op Appt Date: pt to schedule  Post-Op Appt Date: pt to schedule   Packet sent out: Yes  Pre-cert/Authorization completed:  Not Applicable  Date: 5/22/22

## 2022-05-31 ENCOUNTER — OFFICE VISIT (OUTPATIENT)
Dept: FAMILY MEDICINE | Facility: CLINIC | Age: 52
End: 2022-05-31
Payer: COMMERCIAL

## 2022-05-31 VITALS
TEMPERATURE: 98.1 F | WEIGHT: 211 LBS | HEIGHT: 62 IN | BODY MASS INDEX: 38.83 KG/M2 | DIASTOLIC BLOOD PRESSURE: 79 MMHG | HEART RATE: 72 BPM | SYSTOLIC BLOOD PRESSURE: 133 MMHG | RESPIRATION RATE: 18 BRPM | OXYGEN SATURATION: 98 %

## 2022-05-31 DIAGNOSIS — Z30.430 ENCOUNTER FOR INSERTION OF INTRAUTERINE CONTRACEPTIVE DEVICE: Primary | ICD-10-CM

## 2022-05-31 PROCEDURE — 58301 REMOVE INTRAUTERINE DEVICE: CPT | Performed by: FAMILY MEDICINE

## 2022-05-31 PROCEDURE — 58300 INSERT INTRAUTERINE DEVICE: CPT | Performed by: FAMILY MEDICINE

## 2022-05-31 RX ORDER — COPPER 313.4 MG/1
1 INTRAUTERINE DEVICE INTRAUTERINE ONCE
Status: COMPLETED
Start: 2022-05-31 | End: 2022-05-31

## 2022-05-31 RX ORDER — COPPER 313.4 MG/1
1 INTRAUTERINE DEVICE INTRAUTERINE ONCE
COMMUNITY

## 2022-05-31 RX ADMIN — COPPER 1 EACH: 313.4 INTRAUTERINE DEVICE INTRAUTERINE at 10:39

## 2022-05-31 ASSESSMENT — PAIN SCALES - GENERAL: PAINLEVEL: NO PAIN (0)

## 2022-05-31 NOTE — PROGRESS NOTES
"  Assessment & Plan     Encounter for insertion of intrauterine contraceptive device  - paragard intrauterine copper device  - paragard intrauterine copper IUD device 1 each  - INSERTION INTRAUTERINE DEVICE    Return if symptoms worsen or fail to improve.    Betina Pratt MD  [facility]          Jessica Lord is a 51 year old who presents for the following health issues accompanied by her self.    HPI     Chief Complaint   Patient presents with     IUD     Removal of mirena placement od paragard     Is a pregnancy test required: No.  Was a consent obtained?  Yes    Risa Alcaraz is a 51 year old  presents for IUD and desires Paragard type IUD.     This was discussed with her Oncoloist Dr. Mars. Triple negative carcinoma of the breast. Due to possible interactions with progestins with her medications or role in cancer even though progesterone receptor negative. Pt had had paragard 10 years previously, [id number] and it had gone fine. Desires removal of mirena today and placement of paragard    Patient has been given the opportunity to ask questions about all forms of birth control, including all options appropriate for Risa Alcaraz. Discussed that no method of birth control, except abstinence is 100% effective against pregnancy or sexually transmitted infection.     Risa Alcaraz understands she may have the IUD removed at any time. IUD should be removed by a health care provider and the current IUD will be removed today.    The entire removal and insertion procedure was reviewed with the patient, including care after placement.    Review of Systems   Constitutional, HEENT, cardiovascular, pulmonary, gi and gu systems are negative, except as otherwise noted.      Objective    /79   Pulse 72   Temp 98.1  F (36.7  C) (Tympanic)   Resp 18   Ht 1.581 m (5' 2.25\")   Wt 95.7 kg (211 lb)   LMP 05/10/2022 (Approximate)   SpO2 98%   BMI 38.28 kg/m    Body mass index is " 38.28 kg/m .  Physical Exam   GENERAL: healthy, alert and no distress   (female): normal female external genitalia, normal urethral meatus, vaginal mucosa, normal cervix/adnexa/uterus without masses or discharge          PROCEDURE:    A speculum exam was performed and the cervix was visualized. The IUD string was visualized. Using ring forceps, the string was grasped and the IUD removed intact.    Under sterile technique, cervix was visualized with speculum and prepped with Hibiclens solution swab x 3. No tenaculum was needed for stability. The uterus was sounded to 8.5 cm. IUD prepared for placement, and IUD inserted according to 's instructions without difficulty or significant ressitance, and deployed at the fundus. The strings were visualized and trimmed to 3.0 cm from the external os. Tenaculum was removed and hemostasis noted. Speculum removed.  Patient tolerated procedure well.  See MAR for lot#, NDC.  Expiration: Apr 2027   EBL: scant Complications: none      POST PROCEDURE:    Given 's handouts, including when to have IUD removed, list of danger s/sx, side effects and follow up recommended. Encouraged condom use for prevention of STD. Advised to call for any fever, for prolonged or severe pain or bleeding, abnormal vaginal dischage, or unable to palpate strings. She was advised to use pain medications (ibuprofen) as needed for mild to moderate pain. Advised to follow-up in clinic in 4-6 weeks for IUD string check if unable to find strings or as directed by provider.     Betina Pratt MD

## 2022-06-01 ENCOUNTER — HOSPITAL ENCOUNTER (OUTPATIENT)
Dept: MAMMOGRAPHY | Facility: CLINIC | Age: 52
Discharge: HOME | End: 2022-06-01
Attending: SURGERY
Payer: COMMERCIAL

## 2022-06-01 DIAGNOSIS — N63.20 BREAST MASS, LEFT: ICD-10-CM

## 2022-06-01 PROCEDURE — 88305 TISSUE EXAM BY PATHOLOGIST: CPT | Mod: TC | Performed by: SURGERY

## 2022-06-01 PROCEDURE — 88341 IMHCHEM/IMCYTCHM EA ADD ANTB: CPT | Mod: 26 | Performed by: PATHOLOGY

## 2022-06-01 PROCEDURE — 19083 BX BREAST 1ST LESION US IMAG: CPT | Mod: LT

## 2022-06-01 PROCEDURE — 76642 ULTRASOUND BREAST LIMITED: CPT | Mod: LT

## 2022-06-01 PROCEDURE — 999N000065 MA POST PROCEDURE LEFT

## 2022-06-01 PROCEDURE — 88305 TISSUE EXAM BY PATHOLOGIST: CPT | Mod: 26 | Performed by: PATHOLOGY

## 2022-06-01 PROCEDURE — 250N000009 HC RX 250: Performed by: SURGERY

## 2022-06-01 PROCEDURE — 88342 IMHCHEM/IMCYTCHM 1ST ANTB: CPT | Mod: 26 | Performed by: PATHOLOGY

## 2022-06-01 PROCEDURE — 88360 TUMOR IMMUNOHISTOCHEM/MANUAL: CPT | Mod: 26 | Performed by: PATHOLOGY

## 2022-06-01 RX ADMIN — LIDOCAINE HYDROCHLORIDE 5 ML: 10 INJECTION, SOLUTION EPIDURAL; INFILTRATION; INTRACAUDAL; PERINEURAL at 10:34

## 2022-06-01 NOTE — DISCHARGE INSTRUCTIONS

## 2022-06-02 RX ORDER — CEFAZOLIN SODIUM/WATER 2 G/20 ML
2 SYRINGE (ML) INTRAVENOUS SEE ADMIN INSTRUCTIONS
Status: CANCELLED | OUTPATIENT
Start: 2022-06-02

## 2022-06-02 RX ORDER — CEFAZOLIN SODIUM/WATER 2 G/20 ML
2 SYRINGE (ML) INTRAVENOUS
Status: CANCELLED | OUTPATIENT
Start: 2022-06-02

## 2022-06-03 ENCOUNTER — OFFICE VISIT (OUTPATIENT)
Dept: FAMILY MEDICINE | Facility: CLINIC | Age: 52
End: 2022-06-03
Payer: COMMERCIAL

## 2022-06-03 VITALS
TEMPERATURE: 99.3 F | OXYGEN SATURATION: 100 % | SYSTOLIC BLOOD PRESSURE: 136 MMHG | RESPIRATION RATE: 24 BRPM | DIASTOLIC BLOOD PRESSURE: 86 MMHG | BODY MASS INDEX: 37.67 KG/M2 | HEIGHT: 63 IN | WEIGHT: 212.6 LBS | HEART RATE: 69 BPM

## 2022-06-03 DIAGNOSIS — Z01.818 PREOP GENERAL PHYSICAL EXAM: Primary | ICD-10-CM

## 2022-06-03 DIAGNOSIS — C50.911 BILATERAL MALIGNANT NEOPLASM OF BREAST IN FEMALE, UNSPECIFIED ESTROGEN RECEPTOR STATUS, UNSPECIFIED SITE OF BREAST (H): ICD-10-CM

## 2022-06-03 DIAGNOSIS — Z12.11 SCREEN FOR COLON CANCER: ICD-10-CM

## 2022-06-03 DIAGNOSIS — Z11.59 NEED FOR HEPATITIS C SCREENING TEST: ICD-10-CM

## 2022-06-03 DIAGNOSIS — C50.912 BILATERAL MALIGNANT NEOPLASM OF BREAST IN FEMALE, UNSPECIFIED ESTROGEN RECEPTOR STATUS, UNSPECIFIED SITE OF BREAST (H): ICD-10-CM

## 2022-06-03 LAB
ANION GAP SERPL CALCULATED.3IONS-SCNC: 5 MMOL/L (ref 3–14)
BASOPHILS # BLD AUTO: 0 10E3/UL (ref 0–0.2)
BASOPHILS NFR BLD AUTO: 1 %
BUN SERPL-MCNC: 12 MG/DL (ref 7–30)
CALCIUM SERPL-MCNC: 9 MG/DL (ref 8.5–10.1)
CHLORIDE BLD-SCNC: 110 MMOL/L (ref 94–109)
CO2 SERPL-SCNC: 23 MMOL/L (ref 20–32)
CREAT SERPL-MCNC: 0.75 MG/DL (ref 0.52–1.04)
EOSINOPHIL # BLD AUTO: 0.3 10E3/UL (ref 0–0.7)
EOSINOPHIL NFR BLD AUTO: 4 %
ERYTHROCYTE [DISTWIDTH] IN BLOOD BY AUTOMATED COUNT: 13 % (ref 10–15)
GFR SERPL CREATININE-BSD FRML MDRD: >90 ML/MIN/1.73M2
GLUCOSE BLD-MCNC: 93 MG/DL (ref 70–99)
HCT VFR BLD AUTO: 38.9 % (ref 35–47)
HGB BLD-MCNC: 12.9 G/DL (ref 11.7–15.7)
IMM GRANULOCYTES # BLD: 0 10E3/UL
IMM GRANULOCYTES NFR BLD: 0 %
LYMPHOCYTES # BLD AUTO: 1.9 10E3/UL (ref 0.8–5.3)
LYMPHOCYTES NFR BLD AUTO: 26 %
MCH RBC QN AUTO: 33.7 PG (ref 26.5–33)
MCHC RBC AUTO-ENTMCNC: 33.2 G/DL (ref 31.5–36.5)
MCV RBC AUTO: 102 FL (ref 78–100)
MONOCYTES # BLD AUTO: 0.5 10E3/UL (ref 0–1.3)
MONOCYTES NFR BLD AUTO: 6 %
NEUTROPHILS # BLD AUTO: 4.6 10E3/UL (ref 1.6–8.3)
NEUTROPHILS NFR BLD AUTO: 63 %
PATH REPORT.COMMENTS IMP SPEC: NORMAL
PATH REPORT.FINAL DX SPEC: NORMAL
PATH REPORT.GROSS SPEC: NORMAL
PATH REPORT.MICROSCOPIC SPEC OTHER STN: NORMAL
PATH REPORT.MICROSCOPIC SPEC OTHER STN: NORMAL
PATH REPORT.RELEVANT HX SPEC: NORMAL
PHOTO IMAGE: NORMAL
PLATELET # BLD AUTO: 219 10E3/UL (ref 150–450)
POTASSIUM BLD-SCNC: 4.1 MMOL/L (ref 3.4–5.3)
RBC # BLD AUTO: 3.83 10E6/UL (ref 3.8–5.2)
SODIUM SERPL-SCNC: 138 MMOL/L (ref 133–144)
WBC # BLD AUTO: 7.3 10E3/UL (ref 4–11)

## 2022-06-03 PROCEDURE — 80048 BASIC METABOLIC PNL TOTAL CA: CPT | Performed by: NURSE PRACTITIONER

## 2022-06-03 PROCEDURE — 85025 COMPLETE CBC W/AUTO DIFF WBC: CPT | Performed by: NURSE PRACTITIONER

## 2022-06-03 PROCEDURE — 99214 OFFICE O/P EST MOD 30 MIN: CPT | Performed by: NURSE PRACTITIONER

## 2022-06-03 PROCEDURE — 36415 COLL VENOUS BLD VENIPUNCTURE: CPT | Performed by: NURSE PRACTITIONER

## 2022-06-03 ASSESSMENT — PAIN SCALES - GENERAL: PAINLEVEL: MILD PAIN (2)

## 2022-06-03 NOTE — PATIENT INSTRUCTIONS
Preparing for Your Surgery  Getting started  A nurse will call you to review your health history and instructions. They will give you an arrival time based on your scheduled surgery time. Please be ready to share:    Your doctor's clinic name and phone number    Your medical, surgical and anesthesia history    A list of allergies and sensitivities    A list of medicines, including herbal treatments and over-the-counter drugs    Whether the patient has a legal guardian (ask how to send us the papers in advance)  Please tell us if you're pregnant--or if there's any chance you might be pregnant. Some surgeries may injure a fetus (unborn baby), so they require a pregnancy test. Surgeries that are safe for a fetus don't always need a test, and you can choose whether to have one.   If you have a child who's having surgery, please ask for a copy of Preparing for Your Child's Surgery.    Preparing for surgery    Within 30 days of surgery: Have a pre-op exam (sometimes called an H&P, or History and Physical). This can be done at a clinic or pre-operative center.  ? If you're having a , you may not need this exam. Talk to your care team.    At your pre-op exam, talk to your care team about all medicines you take. If you need to stop any medicines before surgery, ask when to start taking them again.  ? We do this for your safety. Many medicines can make you bleed too much during surgery. Some change how well surgery (anesthesia) drugs work.    Call your insurance company to let them know you're having surgery. (If you don't have insurance, call [phone].)    Call your clinic if there's any change in your health. This includes signs of a cold or flu (sore throat, runny nose, cough, rash, fever). It also includes a scrape or scratch near the surgery site.    If you have questions on the day of surgery, call your hospital or surgery center.  COVID testing  You may need to be tested for COVID-19 before having  surgery. If so, we will give you instructions.  Eating and drinking guidelines  For your safety: Unless your surgeon tells you otherwise, follow the guidelines below.    Eat and drink as usual until 8 hours before surgery. After that, no food or milk.    Drink clear liquids until 2 hours before surgery. These are liquids you can see through, like water, Gatorade and Propel Water. You may also have black coffee and tea (no cream or milk).    Nothing by mouth within 2 hours of surgery. This includes gum, candy and breath mints.    If you drink alcohol: Stop drinking it the night before surgery.    If your care team tells you to take medicine on the morning of surgery, it's okay to take it with a sip of water.  Preventing infection    Shower or bathe the night before and morning of your surgery. Follow the instructions your clinic gave you. (If no instructions, use regular soap.)    Don't shave or clip hair near your surgery site. We'll remove the hair if needed.    Don't smoke or vape the morning of surgery. You may chew nicotine gum up to 2 hours before surgery. A nicotine patch is okay.  ? Note: Some surgeries require you to completely quit smoking and nicotine. Check with your surgeon.    Your care team will make every effort to keep you safe from infection. We will:  ? Clean our hands often with soap and water (or an alcohol-based hand rub).  ? Clean the skin at your surgery site with a special soap that kills germs.  ? Give you a special gown to keep you warm. (Cold raises the risk of infection.)  ? Wear special hair covers, masks, gowns and gloves during surgery.  ? Give antibiotic medicine, if prescribed. Not all surgeries need antibiotics.  What to bring on the day of surgery    Photo ID and insurance card    Copy of your health care directive, if you have one    Glasses and hearing aides (bring cases)  ? You can't wear contacts during surgery    Inhaler and eye drops, if you use them (tell us about these when  you arrive)    CPAP machine or breathing device, if you use them    A few personal items, if spending the night    If you have . . .  ? A pacemaker, ICD (cardiac defibrillator) or other implant: Bring the ID card.  ? An implanted stimulator: Bring the remote control.  ? A legal guardian: Bring a copy of the certified (court-stamped) guardianship papers.  Please remove any jewelry, including body piercings. Leave jewelry and other valuables at home.  If you're going home the day of surgery    You must have a responsible adult drive you home. They should stay with you overnight as well.    If you don't have someone to stay with you, and you aren't safe to go home alone, we may keep you overnight. Insurance often won't pay for this.  After surgery  If it's hard to control your pain or you need more pain medicine, please call your surgeon's office.  Questions?   If you have any questions for your care team, list them here: _________________________________________________________________________________________________________________________________________________________________________ ____________________________________ ____________________________________ ____________________________________  For informational purposes only. Not to replace the advice of your health care provider. Copyright   2003, 2019 [facility]. All rights reserved. Clinically reviewed by Jacquie Stern MD. [employer] [id number] - REV 07/21.

## 2022-06-03 NOTE — PROGRESS NOTES
[facility]  [address] [place] [address]  Phone: [phone]  Fax: [phone]  Primary Provider: [facility]  Pre-op Performing Provider: TRAVIS URENA      PREOPERATIVE EVALUATION:  Today's date: 6/3/2022    Risa Alcaraz is a 51 year old female who presents for a preoperative evaluation.    Surgical Information:  Surgery/Procedure: Bilateral skin sparing mastectomies; right axillary sentinel lymph node biopsy, possible limited right axillary dissection; port placement; bilateral breast reconstruction with tissue expanders  Surgery Location:  OR  Surgeon: Dr. Daria Sigala and Dr. Charles Siddiqui  Surgery Date: 06/15/2022  Time of Surgery: 12:30 PM  Where patient plans to recover: At home with family  Fax number for surgical facility: Note does not need to be faxed, will be available electronically in Epic.    Type of Anesthesia Anticipated: General    Assessment & Plan     The proposed surgical procedure is considered LOW risk.    Preop general physical exam  Bilateral malignant neoplasm of breast in female, unspecified estrogen receptor status, unspecified site of breast (H)  - CBC with platelets and differential  - Basic metabolic panel  (Ca, Cl, CO2, Creat, Gluc, K, Na, BUN)  - Asymptomatic COVID-19 Virus (Coronavirus) by PCR      Screen for colon cancer  Due order placed   - Adult Gastro Ref - Procedure Only    Need for hepatitis C screening test  Declined       Risks and Recommendations:  The patient has the following additional risks and recommendations for perioperative complications:   - No identified additional risk factors other than previously addressed    Medication Instructions:  Patient is on no chronic medications    RECOMMENDATION:  APPROVAL GIVEN to proceed with proposed procedure, without further diagnostic evaluation.                      Subjective     HPI related to upcoming procedure:   Breast cancer right and left  side diagnosed after mammogram in April      IUD put in on Tuesday  Copper IUD - now period. No period for 6 yrs previous to this due to Mirena.       Preop Questions 6/3/2022   1. Have you ever had a heart attack or stroke? No   2. Have you ever had surgery on your heart or blood vessels, such as a stent placement, a coronary artery bypass, or surgery on an artery in your head, neck, heart, or legs? No   3. Do you have chest pain with activity? No   4. Do you have a history of  heart failure? No   5. Do you currently have a cold, bronchitis or symptoms of other infection? No   6. Do you have a cough, shortness of breath, or wheezing? No   7. Do you or anyone in your family have previous history of blood clots? No   8. Do you or does anyone in your family have a serious bleeding problem such as prolonged bleeding following surgeries or cuts? No   9. Have you ever had problems with anemia or been told to take iron pills? No   10. Have you had any abnormal blood loss such as black, tarry or bloody stools, or abnormal vaginal bleeding? No   11. Have you ever had a blood transfusion? No   12. Are you willing to have a blood transfusion if it is medically needed before, during, or after your surgery? Yes   13. Have you or any of your relatives ever had problems with anesthesia? No   14. Do you have sleep apnea, excessive snoring or daytime drowsiness? No   15. Do you have any artifical heart valves or other implanted medical devices like a pacemaker, defibrillator, or continuous glucose monitor? No   16. Do you have artificial joints? No   17. Are you allergic to latex? No   18. Is there any chance that you may be pregnant? No       Health Care Directive:  Patient does not have a Health Care Directive or Living Will: Discussed advance care planning with patient; information given to patient to review.    Preoperative Review of :   reviewed - no record of controlled substances prescribed.          Review of  "Systems  Constitutional, neuro, ENT, endocrine, pulmonary, cardiac, gastrointestinal, genitourinary, musculoskeletal, integument and psychiatric systems are negative, except as otherwise noted.    Patient Active Problem List    Diagnosis Date Noted     IUD (intrauterine device) in place 12/17/2016     Priority: Medium     Obesity 04/03/2014     Priority: Medium     CARDIOVASCULAR SCREENING; LDL GOAL LESS THAN 160 10/31/2010     Priority: Medium     Allergic rhinitis 03/15/2006     Priority: Medium     Problem list name updated by automated process. Provider to review        Past Medical History:   Diagnosis Date     NO ACTIVE PROBLEMS      Past Surgical History:   Procedure Laterality Date     [facility] ORAL SURGERY PROCEDURE      wisdom teeth, hypotension with anesthesia     Current Outpatient Medications   Medication Sig Dispense Refill     multivitamin w/minerals (THERA-VIT-M) tablet Take 1 tablet by mouth daily       paragard intrauterine copper device 1 each by Intrauterine route once         No Known Allergies     Social History     Tobacco Use     Smoking status: Former Smoker     Types: Cigarettes, Cigars     Smokeless tobacco: Never Used     Tobacco comment: Quit 1996   Substance Use Topics     Alcohol use: Yes     Comment: occ     Family History   Problem Relation Age of Onset     Diabetes Mother      Hypertension Mother      Cancer Father         penial     Hypertension Father      Cancer Maternal Grandmother         liver     Heart Disease Maternal Grandfather         MI     Cancer Paternal Grandmother         brain     Heart Disease Paternal Grandfather         MI     History   Drug Use No         Objective     BP (!) 154/95   Pulse 69   Temp 99.3  F (37.4  C)   Resp 24   Ht 1.588 m (5' 2.5\")   Wt 96.4 kg (212 lb 9.6 oz)   LMP 05/10/2022 (Approximate)   SpO2 100%   BMI 38.27 kg/m      Physical Exam    GENERAL APPEARANCE: healthy, alert and no distress     EYES: EOMI, PERRL     HENT: ear canals and " TM's normal and nose and mouth without ulcers or lesions     NECK: no adenopathy, no asymmetry, masses, or scars and thyroid normal to palpation     RESP: lungs clear to auscultation - no rales, rhonchi or wheezes     CV: regular rates and rhythm, normal S1 S2, no S3 or S4 and no murmur, click or rub     ABDOMEN:  soft, nontender, no HSM or masses and bowel sounds normal     MS: extremities normal- no gross deformities noted, no evidence of inflammation in joints, FROM in all extremities.     SKIN: no suspicious lesions or rashes     NEURO: Normal strength and tone, sensory exam grossly normal, mentation intact and speech normal     PSYCH: mentation appears normal. and affect normal/bright     LYMPHATICS: No cervical adenopathy    Recent Labs   Lab Test 05/13/22  1016   HGB 13.2         POTASSIUM 4.5   CR 0.98        Diagnostics:  Labs pending at this time.  Results will be reviewed when available.   No EKG required, no history of coronary heart disease, significant arrhythmia, peripheral arterial disease or other structural heart disease.    Revised Cardiac Risk Index (RCRI):  The patient has the following serious cardiovascular risks for perioperative complications:   - No serious cardiac risks = 0 points     RCRI Interpretation: 0 points: Class I (very low risk - 0.4% complication rate)           Signed Electronically by: JIMBO Diaz CNP  Copy of this evaluation report is provided to requesting physician.

## 2022-06-06 ENCOUNTER — TELEPHONE (OUTPATIENT)
Dept: SURGERY | Facility: CLINIC | Age: 52
End: 2022-06-06
Payer: COMMERCIAL

## 2022-06-06 ENCOUNTER — PREP FOR PROCEDURE (OUTPATIENT)
Dept: SURGERY | Facility: CLINIC | Age: 52
End: 2022-06-06
Payer: COMMERCIAL

## 2022-06-06 NOTE — PROGRESS NOTES
Malignant Path:  Pathology report reviewed with our breast radiologist Dr. Real Chew, who confirmed the recent breast imaging is concordant with the final surgical pathology results below.    Dr. Daria Sigala will notify patient of Ultrasound Guided Left Breast Biopsy results showing ductal carcinoma in situ.  Estrogen/ Progesterone Receptors are positive.    Patient states no problems with biopsy site.  Recommended follow up is continued surgical and medical oncology consultation.     Talia Cardona 6/6/2022 9:19 AM    Talia Cardona RN BSN  Breast Care Nurse Coordinator  [facility] [place]  [phone]      Risa Alcaraz [id number]  F, 1970  Surgical Pathology Report (Final result) [id number]  Authorizing Provider: Daria Siglaa MD Ordering Provider: Daria Sigala MD  Ordering Location: [facility]  Collected: 06/01/2022 10:36 AM  Pathologist: Aleah Bauman MD Received: 06/01/2022 11:51 AM  .  Specimens  A Breast, Left  .  .  Final Diagnosis  Left breast, 2.0 cm lesion, 11:00, 5.0 cm from nipple, ultrasound guided 14 gauge needle core biopsies:  - Ductal carcinoma in situ, nuclear grade 1, cribriform and solid patterns., with focal involvement of sclerosing adenosis and  focal cancerization of lobules.  - No evidence of invasive carcinoma.  - No evidence of lymphovascular invasion.  - Estrogen and progesterone receptors: Both 100% tumor cell nuclei positive (see template below)  Additional findings:  - Sclerosing adenosis and columnar cell change.  Electronically signed by Aleah Bauman MD on 6/3/2022 at 3:28 PM

## 2022-06-06 NOTE — TELEPHONE ENCOUNTER
Surgery:    Patient contacted and results of recent left breast biopsy discussed.  I have recommended proceeding with left axillary sentinel lymph node biopsy at the time of surgery.  This was discussed with the patient, who is in agreement.    Final Diagnosis   Left breast, 2.0 cm lesion, 11:00, 5.0 cm from nipple, ultrasound guided 14 gauge needle core biopsies:  - Ductal carcinoma in situ, nuclear grade 1, cribriform and solid patterns., with focal involvement of sclerosing adenosis and focal cancerization of lobules.  - No evidence of invasive carcinoma.  - No evidence of lymphovascular invasion.  - Estrogen and progesterone receptors:  Both 100% tumor cell nuclei positive  (see template below)     Additional findings:  - Sclerosing adenosis and columnar cell change.       Daria Sigala MD

## 2022-06-07 ENCOUNTER — TELEPHONE (OUTPATIENT)
Dept: MAMMOGRAPHY | Facility: CLINIC | Age: 52
End: 2022-06-07
Payer: COMMERCIAL

## 2022-06-07 NOTE — TELEPHONE ENCOUNTER
Breast Care Nurse Coordination:  Preoperative call placed to Risa today to assess her needs, and check in on whether she has any questions or concerns regarding her upcoming breast surgery.    Patient was recently diagnosed with Bilateral Breast Cancer and is scheduled to have a Bilateral Mastectomy with Bilateral sentinel lymph node biopsy and reconstruction by Dr. Daria Sigala and Dr. Charles Ladd on 6/15/22 at the [facility].    Risa states she has had a preop physical exam with her primary provider- Dr. Janet Yap on 6/3/22, and has an appointment this week at [facility] to  a postmastectomy garments this week.  I informed patient to bring one of the post mastectomy garments with her to the hospital the day of surgery.  We discussed the day of surgery and what to expect the days and weeks following.  I informed patient to wear loose, comfortable fitted clothing.     We went ahead and got patient scheduled for her first post op with Dr. Daria Sigala on 7/8/22 @ 11:30a.m. at the [facility].    I answered all of patient's questions completely and thoroughly to her satisfaction.  I informed patient that I will reach out to her next week to check in with her, or she can always call me back with any questions or concerns.  Patient verbalized understanding and agrees with the plan of care.     Aracely Mayer RN BSN  Breast Care Nurse Coordinator  [facility] [place] [place]  [phone]

## 2022-06-08 ENCOUNTER — DOCUMENTATION ONLY (OUTPATIENT)
Dept: ONCOLOGY | Facility: CLINIC | Age: 52
End: 2022-06-08
Payer: COMMERCIAL

## 2022-06-08 NOTE — PROGRESS NOTES
I called Risa and left a message informing her that I have reviewed her breast MRI and left breast biopsy findings.  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  The final pathology will be reviewed with her after surgery. Will arrange for follow-up 2 weeks after her bilateral mastectomies.    Briseyda Mars MD  Hematology/Oncology  [facility]

## 2022-06-09 ENCOUNTER — LAB (OUTPATIENT)
Dept: LAB | Facility: CLINIC | Age: 52
End: 2022-06-09
Payer: COMMERCIAL

## 2022-06-09 DIAGNOSIS — Z17.1 MALIGNANT NEOPLASM OF CENTRAL PORTION OF RIGHT BREAST IN FEMALE, ESTROGEN RECEPTOR NEGATIVE (H): Primary | ICD-10-CM

## 2022-06-09 DIAGNOSIS — C50.111 MALIGNANT NEOPLASM OF CENTRAL PORTION OF RIGHT BREAST IN FEMALE, ESTROGEN RECEPTOR NEGATIVE (H): Primary | ICD-10-CM

## 2022-06-09 PROCEDURE — 81162 BRCA1&2 GEN FULL SEQ DUP/DEL: CPT | Performed by: SURGERY

## 2022-06-09 PROCEDURE — G0452 MOLECULAR PATHOLOGY INTERPR: HCPCS | Performed by: STUDENT IN AN ORGANIZED HEALTH CARE EDUCATION/TRAINING PROGRAM

## 2022-06-10 ENCOUNTER — TELEPHONE (OUTPATIENT)
Dept: SURGERY | Facility: CLINIC | Age: 52
End: 2022-06-10
Payer: COMMERCIAL

## 2022-06-10 NOTE — TELEPHONE ENCOUNTER
"Surgery:    Patient contacted and notified of breast actionable panel results- \"No pathogenic or likely pathogenic variants were detected in the genes analyzed. Therefore, a hereditary cause for this patient's personal and/or family history of cancer was not identified.\"    Daria Sigala MD    "

## 2022-06-12 ENCOUNTER — LAB (OUTPATIENT)
Dept: LAB | Facility: CLINIC | Age: 52
End: 2022-06-12
Attending: NURSE PRACTITIONER
Payer: COMMERCIAL

## 2022-06-12 DIAGNOSIS — Z01.818 PREOP GENERAL PHYSICAL EXAM: ICD-10-CM

## 2022-06-12 PROCEDURE — U0005 INFEC AGEN DETEC AMPLI PROBE: HCPCS

## 2022-06-12 PROCEDURE — U0003 INFECTIOUS AGENT DETECTION BY NUCLEIC ACID (DNA OR RNA); SEVERE ACUTE RESPIRATORY SYNDROME CORONAVIRUS 2 (SARS-COV-2) (CORONAVIRUS DISEASE [COVID-19]), AMPLIFIED PROBE TECHNIQUE, MAKING USE OF HIGH THROUGHPUT TECHNOLOGIES AS DESCRIBED BY CMS-2020-01-R: HCPCS

## 2022-06-13 LAB — SARS-COV-2 RNA RESP QL NAA+PROBE: NEGATIVE

## 2022-06-14 DIAGNOSIS — C50.919 METAPLASTIC CARCINOMA OF BREAST (H): Primary | ICD-10-CM

## 2022-06-14 RX ORDER — MULTIVIT WITH MINERALS/LUTEIN
1000 TABLET ORAL DAILY
COMMUNITY

## 2022-06-14 NOTE — PROGRESS NOTES
[facility]    Hematology/Oncology Established Patient Note      Today's Date: 6/29/22    Reason for follow-up: Right breast cancer.    HISTORY OF PRESENT ILLNESS: Risa Alcaraz is a 51 year old female who presents with the following oncologic history:  1.  4/22/2022: Mammogram showed distortion in the central right breast.  Left breast is negative.  2.  5/2/2022: Diagnostic right mammogram showed distortion in the retroareolar breast.  Targeted ultrasound of the right breast at 12:00, 2 cm from the nipple measured 1.8 x 1.3 cm.  Survey of axilla showed no evidence of adenopathy.  3.  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  4. 5/24/2022: Breast MRI showed nonmass enhancement at the 12:00 position of the RIGHT breast corresponds to biopsy-proven malignancy and measures 4.3 x 2.3 x 3.2 cm. Nodular nonmass enhancement at the 12:00 position of the LEFT breast. No evidence of adenopathy.  5. 5/25/2022: CT chest/abdomen/pelvis showed 2 mm pulmonary nodule in right upper lobe posteriorly; mildly prominent prevascular lymph node between the right brachiocephalic and left common carotid arteries measures 8 mm, upper limits of normal; no definite evidence of metastatic disease. NM bone scan showed no bone metastases.  6. 6/1/2022: Left breast U/S showed hypoechoic area measuring 2 cm at 11:00, 5 cm from nipple corresponding to MRI finding. Biopsy of left breast lesion showed nuclear grade 1 DCIS; no evidence of invasive carcinoma or LVI, ER and TN both 100% positive. After breast tumor board discussion, consensus was for patient to proceed with surgery upfront followed by adjuvant chemotherapy.  7. 6/9/2022: BRCA gene panel negative.  8. 6/15/2022: Underwent  bilateral skin sparing mastectomies with bilateral axillary sentinel lymph node excision, port placement under care of Dr. Daria Sigala and reconstruction with tissue expanders with Dr. Charles Ladd.  Pathology showed 8 x 5 mm right breast invasive mammary carcinoma with features of low-grade fibromatosis like metaplastic carcinoma and without extension to margins.  Right breast tumor was multifocal with total of 3 foci: 8 mm, 3 mm, 2 mm.  Extensive DCIS, grade 1 and 2 with involvement of sclerosing adenosis and cancerization of lobules and without extension of margins.  A single right axillary lymph node is negative for metastatic disease.  Left breast showed extensive DCIS involving extensive sclerosing adenosis without extension to margins.  No evidence of invasive carcinoma in the left breast.  A single left axillary lymph node is negative for metastatic disease.    INTERVAL HISTORY:  Risa reports feeling well postop.    REVIEW OF SYSTEMS:   14 point ROS was reviewed and is negative other than as noted above in HPI.       HOME MEDICATIONS:  Current Outpatient Medications   Medication Sig Dispense Refill     multivitamin w/minerals (THERA-VIT-M) tablet Take 1 tablet by mouth daily       paragard intrauterine copper device 1 each by Intrauterine route once           ALLERGIES:  No Known Allergies      PAST MEDICAL HISTORY:  Past Medical History:   Diagnosis Date     NO ACTIVE PROBLEMS          PAST SURGICAL HISTORY:  Past Surgical History:   Procedure Laterality Date     [facility] ORAL SURGERY PROCEDURE      wisdom teeth, hypotension with anesthesia         SOCIAL HISTORY:  Social History     Socioeconomic History     Marital status:      Spouse name: Not on file     Number of children: Not on file     Years of education: Not on file     Highest education level: Not on file   Occupational History     Employer: [employer]   Tobacco Use     Smoking status: Former Smoker     Types: Cigarettes, Cigars     Smokeless  "tobacco: Never Used     Tobacco comment: Quit    Vaping Use     Vaping Use: Never used   Substance and Sexual Activity     Alcohol use: Yes     Comment: occ     Drug use: No     Sexual activity: Yes     Partners: Male     Birth control/protection: I.U.D.     Comment: mirena    Other Topics Concern     Parent/sibling w/ CABG, MI or angioplasty before 65F 55M? Not Asked   Social History Narrative     Not on file     Social Determinants of Health     Financial Resource Strain: Not on file   Food Insecurity: Not on file   Transportation Needs: Not on file   Physical Activity: Not on file   Stress: Not on file   Social Connections: Not on file   Intimate Partner Violence: Not on file   Housing Stability: Not on file         FAMILY HISTORY:  Family History   Problem Relation Age of Onset     Diabetes Mother      Hypertension Mother      Cancer Father         penial     Hypertension Father      Cancer Maternal Grandmother         liver     Heart Disease Maternal Grandfather         MI     Cancer Paternal Grandmother         brain     Heart Disease Paternal Grandfather         MI         PHYSICAL EXAM:  Vital signs:  /81   Pulse 80   Temp 98.5  F (36.9  C) (Oral)   Resp 18   Ht 1.6 m (5' 3\")   Wt 95.1 kg (209 lb 9.6 oz)   LMP 05/10/2022 (Approximate)   SpO2 96%   BMI 37.13 kg/m     ECO  GENERAL/CONSTITUTIONAL: No acute distress.  EYES:  No scleral icterus.  ENT/MOUTH: Neck supple. Mask in place.  RESPIRATORY: No audible cough or wheezing.   NEUROLOGIC: Alert, oriented, answers questions appropriately.  INTEGUMENTARY: No rashes or jaundice.        LABS:  CBC RESULTS: Recent Labs   Lab Test 22  1527   WBC 7.3   RBC 3.83   HGB 12.9   HCT 38.9   *   MCH 33.7*   MCHC 33.2   RDW 13.0        Last Comprehensive Metabolic Panel:  Sodium   Date Value Ref Range Status   2022 138 133 - 144 mmol/L Final     Potassium   Date Value Ref Range Status   2022 4.1 3.4 - 5.3 mmol/L Final "     Chloride   Date Value Ref Range Status   06/03/2022 110 (H) 94 - 109 mmol/L Final     Carbon Dioxide (CO2)   Date Value Ref Range Status   06/03/2022 23 20 - 32 mmol/L Final     Anion Gap   Date Value Ref Range Status   06/03/2022 5 3 - 14 mmol/L Final     Glucose   Date Value Ref Range Status   06/03/2022 93 70 - 99 mg/dL Final   01/30/2015 95 70 - 99 mg/dL Final     Comment:     Effective 7/30/2014, the reference range for this assay has changed to reflect   new instrumentation/methodology.       Urea Nitrogen   Date Value Ref Range Status   06/03/2022 12 7 - 30 mg/dL Final     Creatinine   Date Value Ref Range Status   06/03/2022 0.75 0.52 - 1.04 mg/dL Final     GFR Estimate   Date Value Ref Range Status   06/03/2022 >90 >60 mL/min/1.73m2 Final     Comment:     Effective December 21, 2021 eGFRcr in adults is calculated using the 2021 CKD-EPI creatinine equation which includes age and gender (Luh et al., [place], DOI: 10.1056/CCTDuh5861346)     Calcium   Date Value Ref Range Status   06/03/2022 9.0 8.5 - 10.1 mg/dL Final     Alkaline Phosphatase   Date Value Ref Range Status   05/13/2022 79 40 - 150 U/L Final     ALT   Date Value Ref Range Status   05/13/2022 20 0 - 50 U/L Final     AST   Date Value Ref Range Status   05/13/2022 17 0 - 45 U/L Final       PATHOLOGY:  Reviewed as per HPI.    IMAGING:  Reviewed as per HPI.    ASSESSMENT/PLAN:  Risa Alcaraz is a 51 year old female with the following issues:  1.  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 Left breast DCIS  - Risa is now status post bilateral mastectomies with negative surgical margins and no lymph node involvement.  We discussed her final pathology results today.   -- Given her triple negative left breast cancer,  I recommended adjuvant chemotherapy with Taxotere and Cytoxan every 3 months for a total  of 4 cycles to reduce her risk of breast cancer recurrence.  - I discussed the potential adverse effects of Taxotere and Cytoxan, including but not limited to alopecia, cytopenias, fever, increased risk for infection, infusion reactions, rash, bowel or bladder dysfunction, nausea, emesis, peripheral neuropathy, fatigue.  She consents to adjuvant chemotherapy with TC.  - We will provide additional information on scalp cooling, which she is potentially interested in.  She wishes to undergo chemotherapy closer to home in [place].  She is receptive to seeing me for 2 of the 4 cycles of treatment.  She can see a nurse practitioner in [place] for the other 2 cycles.  - I did recommend cooling her hands and feet during the Taxotere infusions to reduce the risk of peripheral neuropathy.  - Discussed that her overall prognosis is still excellent given the lower tendency for the fibromatosis low-grade type of metaplastic cancer to metastasize distantly or to the lymph nodes.  - Discussed that surveillance following completion of chemotherapy would largely be based on history and physical exams with imaging and lab work-up reserved for any concerning signs or symptoms that could arise.    3. Indeterminate pulmonary nodule  --Will reevaluate with 6-month interval chest CT at the end of 11/2022 or early 12/2020.    Briseyda Mars MD  Hematology/Oncology  [facility]    Total time spent: 62 minutes in patient evaluation, counseling, documentation, and coordination of care.

## 2022-06-15 ENCOUNTER — HOSPITAL ENCOUNTER (OUTPATIENT)
Facility: CLINIC | Age: 52
Discharge: HOME | End: 2022-06-16
Attending: SURGERY | Admitting: PLASTIC SURGERY
Payer: COMMERCIAL

## 2022-06-15 ENCOUNTER — APPOINTMENT (OUTPATIENT)
Dept: GENERAL RADIOLOGY | Facility: CLINIC | Age: 52
End: 2022-06-15
Attending: SURGERY
Payer: COMMERCIAL

## 2022-06-15 ENCOUNTER — ANESTHESIA (OUTPATIENT)
Dept: SURGERY | Facility: CLINIC | Age: 52
End: 2022-06-15
Payer: COMMERCIAL

## 2022-06-15 ENCOUNTER — HOSPITAL ENCOUNTER (OUTPATIENT)
Dept: NUCLEAR MEDICINE | Facility: CLINIC | Age: 52
Setting detail: NUCLEAR MEDICINE
Discharge: HOME | End: 2022-06-15
Attending: SURGERY | Admitting: SURGERY
Payer: COMMERCIAL

## 2022-06-15 ENCOUNTER — ANESTHESIA EVENT (OUTPATIENT)
Dept: SURGERY | Facility: CLINIC | Age: 52
End: 2022-06-15
Payer: COMMERCIAL

## 2022-06-15 ENCOUNTER — APPOINTMENT (OUTPATIENT)
Dept: SURGERY | Facility: PHYSICIAN GROUP | Age: 52
End: 2022-06-15
Payer: COMMERCIAL

## 2022-06-15 DIAGNOSIS — Z98.890 POSTOPERATIVE NAUSEA: Primary | ICD-10-CM

## 2022-06-15 DIAGNOSIS — C50.919 METAPLASTIC CARCINOMA OF BREAST (H): ICD-10-CM

## 2022-06-15 DIAGNOSIS — R11.0 POSTOPERATIVE NAUSEA: Primary | ICD-10-CM

## 2022-06-15 LAB — HCG UR QL: NEGATIVE

## 2022-06-15 PROCEDURE — 250N000011 HC RX IP 250 OP 636

## 2022-06-15 PROCEDURE — 272N000001 HC OR GENERAL SUPPLY STERILE: Performed by: SURGERY

## 2022-06-15 PROCEDURE — 258N000003 HC RX IP 258 OP 636: Performed by: SURGERY

## 2022-06-15 PROCEDURE — 81025 URINE PREGNANCY TEST: CPT | Performed by: SURGERY

## 2022-06-15 PROCEDURE — 258N000003 HC RX IP 258 OP 636: Performed by: NURSE ANESTHETIST, CERTIFIED REGISTERED

## 2022-06-15 PROCEDURE — 250N000009 HC RX 250: Performed by: NURSE ANESTHETIST, CERTIFIED REGISTERED

## 2022-06-15 PROCEDURE — 88342 IMHCHEM/IMCYTCHM 1ST ANTB: CPT | Mod: 26 | Performed by: PATHOLOGY

## 2022-06-15 PROCEDURE — 250N000011 HC RX IP 250 OP 636: Performed by: NURSE ANESTHETIST, CERTIFIED REGISTERED

## 2022-06-15 PROCEDURE — L8699 PROSTHETIC IMPLANT NOS: HCPCS | Performed by: SURGERY

## 2022-06-15 PROCEDURE — 250N000011 HC RX IP 250 OP 636: Performed by: SURGERY

## 2022-06-15 PROCEDURE — 343N000001 HC RX 343: Performed by: SURGERY

## 2022-06-15 PROCEDURE — 250N000009 HC RX 250: Performed by: SURGERY

## 2022-06-15 PROCEDURE — 999N000141 HC STATISTIC PRE-PROCEDURE NURSING ASSESSMENT: Performed by: SURGERY

## 2022-06-15 PROCEDURE — 88307 TISSUE EXAM BY PATHOLOGIST: CPT | Mod: TC | Performed by: SURGERY

## 2022-06-15 PROCEDURE — C1788 PORT, INDWELLING, IMP: HCPCS | Performed by: SURGERY

## 2022-06-15 PROCEDURE — 360N000083 HC SURGERY LEVEL 3 W/ FLUORO, PER MIN: Performed by: SURGERY

## 2022-06-15 PROCEDURE — 999N000179 XR SURGERY CARM FLUORO LESS THAN 5 MIN W STILLS

## 2022-06-15 PROCEDURE — 710N000009 HC RECOVERY PHASE 1, LEVEL 1, PER MIN: Performed by: SURGERY

## 2022-06-15 PROCEDURE — A9520 TC99 TILMANOCEPT DIAG 0.5MCI: HCPCS | Performed by: SURGERY

## 2022-06-15 PROCEDURE — 250N000025 HC SEVOFLURANE, PER MIN: Performed by: SURGERY

## 2022-06-15 PROCEDURE — 370N000017 HC ANESTHESIA TECHNICAL FEE, PER MIN: Performed by: SURGERY

## 2022-06-15 PROCEDURE — 88341 IMHCHEM/IMCYTCHM EA ADD ANTB: CPT | Mod: 26 | Performed by: PATHOLOGY

## 2022-06-15 PROCEDURE — 250N000013 HC RX MED GY IP 250 OP 250 PS 637

## 2022-06-15 PROCEDURE — 38792 RA TRACER ID OF SENTINL NODE: CPT

## 2022-06-15 PROCEDURE — 88307 TISSUE EXAM BY PATHOLOGIST: CPT | Mod: 26 | Performed by: PATHOLOGY

## 2022-06-15 DEVICE — CATH PORT POWERPORT CLEARVUE ISP 8FR 5608062: Type: IMPLANTABLE DEVICE | Site: NECK | Status: FUNCTIONAL

## 2022-06-15 DEVICE — NATRELLE TE SMOOTH 133S-FX-13-T (US)
Type: IMPLANTABLE DEVICE | Site: BREAST | Status: FUNCTIONAL
Brand: NATRELLE 133S TISSUE EXPANDERS

## 2022-06-15 DEVICE — GRAFT ALLODERM 9.6X19.3CM CM1518P CHG PER SQ CM= 132 UNITS: Type: IMPLANTABLE DEVICE | Site: BREAST | Status: FUNCTIONAL

## 2022-06-15 RX ORDER — DEXAMETHASONE SODIUM PHOSPHATE 4 MG/ML
INJECTION, SOLUTION INTRA-ARTICULAR; INTRALESIONAL; INTRAMUSCULAR; INTRAVENOUS; SOFT TISSUE PRN
Status: DISCONTINUED | OUTPATIENT
Start: 2022-06-15 | End: 2022-06-15

## 2022-06-15 RX ORDER — OXYCODONE HYDROCHLORIDE 5 MG/1
5 TABLET ORAL EVERY 4 HOURS PRN
Status: DISCONTINUED | OUTPATIENT
Start: 2022-06-15 | End: 2022-06-15 | Stop reason: HOSPADM

## 2022-06-15 RX ORDER — SODIUM CHLORIDE, SODIUM LACTATE, POTASSIUM CHLORIDE, CALCIUM CHLORIDE 600; 310; 30; 20 MG/100ML; MG/100ML; MG/100ML; MG/100ML
INJECTION, SOLUTION INTRAVENOUS CONTINUOUS
Status: DISCONTINUED | OUTPATIENT
Start: 2022-06-15 | End: 2022-06-15 | Stop reason: HOSPADM

## 2022-06-15 RX ORDER — ACETAMINOPHEN 325 MG/1
975 TABLET ORAL EVERY 8 HOURS SCHEDULED
Status: DISCONTINUED | OUTPATIENT
Start: 2022-06-15 | End: 2022-06-16 | Stop reason: HOSPADM

## 2022-06-15 RX ORDER — HYDROMORPHONE HCL IN WATER/PF 6 MG/30 ML
0.2 PATIENT CONTROLLED ANALGESIA SYRINGE INTRAVENOUS
Status: DISCONTINUED | OUTPATIENT
Start: 2022-06-15 | End: 2022-06-16 | Stop reason: HOSPADM

## 2022-06-15 RX ORDER — PROPOFOL 10 MG/ML
INJECTION, EMULSION INTRAVENOUS CONTINUOUS PRN
Status: DISCONTINUED | OUTPATIENT
Start: 2022-06-15 | End: 2022-06-15

## 2022-06-15 RX ORDER — NALOXONE HYDROCHLORIDE 0.4 MG/ML
0.4 INJECTION, SOLUTION INTRAMUSCULAR; INTRAVENOUS; SUBCUTANEOUS
Status: DISCONTINUED | OUTPATIENT
Start: 2022-06-15 | End: 2022-06-16 | Stop reason: HOSPADM

## 2022-06-15 RX ORDER — AMOXICILLIN 250 MG
1 CAPSULE ORAL 2 TIMES DAILY
Status: DISCONTINUED | OUTPATIENT
Start: 2022-06-15 | End: 2022-06-16 | Stop reason: HOSPADM

## 2022-06-15 RX ORDER — ONDANSETRON 2 MG/ML
4 INJECTION INTRAMUSCULAR; INTRAVENOUS EVERY 6 HOURS PRN
Status: DISCONTINUED | OUTPATIENT
Start: 2022-06-15 | End: 2022-06-16 | Stop reason: HOSPADM

## 2022-06-15 RX ORDER — HEPARIN SODIUM (PORCINE) LOCK FLUSH IV SOLN 100 UNIT/ML 100 UNIT/ML
SOLUTION INTRAVENOUS
Status: DISCONTINUED
Start: 2022-06-15 | End: 2022-06-15 | Stop reason: HOSPADM

## 2022-06-15 RX ORDER — GLYCOPYRROLATE 0.2 MG/ML
INJECTION, SOLUTION INTRAMUSCULAR; INTRAVENOUS PRN
Status: DISCONTINUED | OUTPATIENT
Start: 2022-06-15 | End: 2022-06-15

## 2022-06-15 RX ORDER — CEFAZOLIN SODIUM 1 G/3ML
INJECTION, POWDER, FOR SOLUTION INTRAMUSCULAR; INTRAVENOUS
Status: DISCONTINUED
Start: 2022-06-15 | End: 2022-06-15 | Stop reason: HOSPADM

## 2022-06-15 RX ORDER — NALOXONE HYDROCHLORIDE 0.4 MG/ML
0.2 INJECTION, SOLUTION INTRAMUSCULAR; INTRAVENOUS; SUBCUTANEOUS
Status: DISCONTINUED | OUTPATIENT
Start: 2022-06-15 | End: 2022-06-16 | Stop reason: HOSPADM

## 2022-06-15 RX ORDER — OXYCODONE HYDROCHLORIDE 5 MG/1
5 TABLET ORAL EVERY 4 HOURS PRN
Status: DISCONTINUED | OUTPATIENT
Start: 2022-06-15 | End: 2022-06-16 | Stop reason: HOSPADM

## 2022-06-15 RX ORDER — OXYCODONE HYDROCHLORIDE 5 MG/1
10 TABLET ORAL EVERY 4 HOURS PRN
Status: DISCONTINUED | OUTPATIENT
Start: 2022-06-15 | End: 2022-06-16 | Stop reason: HOSPADM

## 2022-06-15 RX ORDER — ACETAMINOPHEN 325 MG/1
325-650 TABLET ORAL EVERY 6 HOURS PRN
Status: ON HOLD | COMMUNITY
End: 2022-10-17

## 2022-06-15 RX ORDER — ONDANSETRON 2 MG/ML
4 INJECTION INTRAMUSCULAR; INTRAVENOUS EVERY 30 MIN PRN
Status: DISCONTINUED | OUTPATIENT
Start: 2022-06-15 | End: 2022-06-15 | Stop reason: HOSPADM

## 2022-06-15 RX ORDER — IBUPROFEN 600 MG/1
600 TABLET, FILM COATED ORAL EVERY 6 HOURS PRN
Status: DISCONTINUED | OUTPATIENT
Start: 2022-06-15 | End: 2022-06-16 | Stop reason: HOSPADM

## 2022-06-15 RX ORDER — POLYETHYLENE GLYCOL 3350 17 G/17G
17 POWDER, FOR SOLUTION ORAL DAILY
Status: DISCONTINUED | OUTPATIENT
Start: 2022-06-16 | End: 2022-06-16 | Stop reason: HOSPADM

## 2022-06-15 RX ORDER — ONDANSETRON 4 MG/1
4 TABLET, ORALLY DISINTEGRATING ORAL EVERY 6 HOURS PRN
Status: DISCONTINUED | OUTPATIENT
Start: 2022-06-15 | End: 2022-06-16 | Stop reason: HOSPADM

## 2022-06-15 RX ORDER — HYDROMORPHONE HCL IN WATER/PF 6 MG/30 ML
0.4 PATIENT CONTROLLED ANALGESIA SYRINGE INTRAVENOUS
Status: DISCONTINUED | OUTPATIENT
Start: 2022-06-15 | End: 2022-06-16 | Stop reason: HOSPADM

## 2022-06-15 RX ORDER — HYDROMORPHONE HCL IN WATER/PF 6 MG/30 ML
0.2 PATIENT CONTROLLED ANALGESIA SYRINGE INTRAVENOUS EVERY 5 MIN PRN
Status: DISCONTINUED | OUTPATIENT
Start: 2022-06-15 | End: 2022-06-15 | Stop reason: HOSPADM

## 2022-06-15 RX ORDER — FENTANYL CITRATE 50 UG/ML
INJECTION, SOLUTION INTRAMUSCULAR; INTRAVENOUS PRN
Status: DISCONTINUED | OUTPATIENT
Start: 2022-06-15 | End: 2022-06-15

## 2022-06-15 RX ORDER — METHOCARBAMOL 750 MG/1
750 TABLET, FILM COATED ORAL EVERY 6 HOURS PRN
Status: DISCONTINUED | OUTPATIENT
Start: 2022-06-15 | End: 2022-06-16 | Stop reason: HOSPADM

## 2022-06-15 RX ORDER — LIDOCAINE HYDROCHLORIDE 10 MG/ML
INJECTION, SOLUTION INFILTRATION; PERINEURAL
Status: DISCONTINUED
Start: 2022-06-15 | End: 2022-06-15 | Stop reason: HOSPADM

## 2022-06-15 RX ORDER — HEPARIN SODIUM 1000 [USP'U]/ML
INJECTION, SOLUTION INTRAVENOUS; SUBCUTANEOUS
Status: DISCONTINUED
Start: 2022-06-15 | End: 2022-06-15 | Stop reason: HOSPADM

## 2022-06-15 RX ORDER — BISACODYL 10 MG
10 SUPPOSITORY, RECTAL RECTAL DAILY PRN
Status: DISCONTINUED | OUTPATIENT
Start: 2022-06-15 | End: 2022-06-16 | Stop reason: HOSPADM

## 2022-06-15 RX ORDER — LIDOCAINE HYDROCHLORIDE 20 MG/ML
INJECTION, SOLUTION INFILTRATION; PERINEURAL PRN
Status: DISCONTINUED | OUTPATIENT
Start: 2022-06-15 | End: 2022-06-15

## 2022-06-15 RX ORDER — EPINEPHRINE 1 MG/ML
INJECTION, SOLUTION INTRAMUSCULAR; SUBCUTANEOUS
Status: DISCONTINUED
Start: 2022-06-15 | End: 2022-06-15 | Stop reason: HOSPADM

## 2022-06-15 RX ORDER — CEFAZOLIN SODIUM 2 G/100ML
2 INJECTION, SOLUTION INTRAVENOUS EVERY 8 HOURS
Status: COMPLETED | OUTPATIENT
Start: 2022-06-15 | End: 2022-06-16

## 2022-06-15 RX ORDER — CEFAZOLIN SODIUM/WATER 2 G/20 ML
2 SYRINGE (ML) INTRAVENOUS
Status: COMPLETED | OUTPATIENT
Start: 2022-06-15 | End: 2022-06-15

## 2022-06-15 RX ORDER — FENTANYL CITRATE 0.05 MG/ML
25 INJECTION, SOLUTION INTRAMUSCULAR; INTRAVENOUS
Status: DISCONTINUED | OUTPATIENT
Start: 2022-06-15 | End: 2022-06-15 | Stop reason: HOSPADM

## 2022-06-15 RX ORDER — ONDANSETRON 2 MG/ML
INJECTION INTRAMUSCULAR; INTRAVENOUS PRN
Status: DISCONTINUED | OUTPATIENT
Start: 2022-06-15 | End: 2022-06-15

## 2022-06-15 RX ORDER — MEPERIDINE HYDROCHLORIDE 25 MG/ML
12.5 INJECTION INTRAMUSCULAR; INTRAVENOUS; SUBCUTANEOUS
Status: DISCONTINUED | OUTPATIENT
Start: 2022-06-15 | End: 2022-06-15 | Stop reason: HOSPADM

## 2022-06-15 RX ORDER — CEFAZOLIN SODIUM/WATER 2 G/20 ML
2 SYRINGE (ML) INTRAVENOUS SEE ADMIN INSTRUCTIONS
Status: DISCONTINUED | OUTPATIENT
Start: 2022-06-15 | End: 2022-06-15 | Stop reason: HOSPADM

## 2022-06-15 RX ORDER — LIDOCAINE 40 MG/G
CREAM TOPICAL
Status: DISCONTINUED | OUTPATIENT
Start: 2022-06-15 | End: 2022-06-16 | Stop reason: HOSPADM

## 2022-06-15 RX ORDER — NEOSTIGMINE METHYLSULFATE 1 MG/ML
VIAL (ML) INJECTION PRN
Status: DISCONTINUED | OUTPATIENT
Start: 2022-06-15 | End: 2022-06-15

## 2022-06-15 RX ORDER — PROCHLORPERAZINE MALEATE 10 MG
10 TABLET ORAL EVERY 6 HOURS PRN
Status: DISCONTINUED | OUTPATIENT
Start: 2022-06-15 | End: 2022-06-16 | Stop reason: HOSPADM

## 2022-06-15 RX ORDER — ACETAMINOPHEN 325 MG/1
650 TABLET ORAL EVERY 4 HOURS PRN
Status: DISCONTINUED | OUTPATIENT
Start: 2022-06-18 | End: 2022-06-16 | Stop reason: HOSPADM

## 2022-06-15 RX ORDER — BUPIVACAINE HYDROCHLORIDE 2.5 MG/ML
INJECTION, SOLUTION EPIDURAL; INFILTRATION; INTRACAUDAL
Status: DISCONTINUED
Start: 2022-06-15 | End: 2022-06-15 | Stop reason: HOSPADM

## 2022-06-15 RX ORDER — ONDANSETRON 4 MG/1
4 TABLET, ORALLY DISINTEGRATING ORAL EVERY 30 MIN PRN
Status: DISCONTINUED | OUTPATIENT
Start: 2022-06-15 | End: 2022-06-15 | Stop reason: HOSPADM

## 2022-06-15 RX ORDER — FENTANYL CITRATE 0.05 MG/ML
25 INJECTION, SOLUTION INTRAMUSCULAR; INTRAVENOUS EVERY 5 MIN PRN
Status: DISCONTINUED | OUTPATIENT
Start: 2022-06-15 | End: 2022-06-15 | Stop reason: HOSPADM

## 2022-06-15 RX ADMIN — DEXAMETHASONE SODIUM PHOSPHATE 4 MG: 4 INJECTION, SOLUTION INTRA-ARTICULAR; INTRALESIONAL; INTRAMUSCULAR; INTRAVENOUS; SOFT TISSUE at 13:31

## 2022-06-15 RX ADMIN — NEOSTIGMINE METHYLSULFATE 5 MG: 1 INJECTION, SOLUTION INTRAVENOUS at 18:13

## 2022-06-15 RX ADMIN — ONDANSETRON 4 MG: 2 INJECTION INTRAMUSCULAR; INTRAVENOUS at 18:02

## 2022-06-15 RX ADMIN — CEFAZOLIN SODIUM 2 G: 2 INJECTION, SOLUTION INTRAVENOUS at 21:59

## 2022-06-15 RX ADMIN — SODIUM CHLORIDE, POTASSIUM CHLORIDE, SODIUM LACTATE AND CALCIUM CHLORIDE: 600; 310; 30; 20 INJECTION, SOLUTION INTRAVENOUS at 14:16

## 2022-06-15 RX ADMIN — HYDROMORPHONE HYDROCHLORIDE 0.5 MG: 1 INJECTION, SOLUTION INTRAMUSCULAR; INTRAVENOUS; SUBCUTANEOUS at 14:52

## 2022-06-15 RX ADMIN — OXYCODONE HYDROCHLORIDE 10 MG: 5 TABLET ORAL at 23:50

## 2022-06-15 RX ADMIN — PROPOFOL 30 MCG/KG/MIN: 10 INJECTION, EMULSION INTRAVENOUS at 13:16

## 2022-06-15 RX ADMIN — LIDOCAINE HYDROCHLORIDE 100 MG: 20 INJECTION, SOLUTION INFILTRATION; PERINEURAL at 12:56

## 2022-06-15 RX ADMIN — ROCURONIUM BROMIDE 40 MG: 50 INJECTION, SOLUTION INTRAVENOUS at 12:56

## 2022-06-15 RX ADMIN — ROPIVACAINE HYDROCHLORIDE 45 ML: 5 INJECTION, SOLUTION EPIDURAL; INFILTRATION; PERINEURAL at 13:08

## 2022-06-15 RX ADMIN — ACETAMINOPHEN 975 MG: 325 TABLET ORAL at 21:59

## 2022-06-15 RX ADMIN — FENTANYL CITRATE 50 MCG: 50 INJECTION, SOLUTION INTRAMUSCULAR; INTRAVENOUS at 14:25

## 2022-06-15 RX ADMIN — FENTANYL CITRATE 25 MCG: 50 INJECTION, SOLUTION INTRAMUSCULAR; INTRAVENOUS at 18:38

## 2022-06-15 RX ADMIN — ONDANSETRON 4 MG: 2 INJECTION INTRAMUSCULAR; INTRAVENOUS at 18:48

## 2022-06-15 RX ADMIN — ONDANSETRON 4 MG: 4 TABLET, ORALLY DISINTEGRATING ORAL at 21:59

## 2022-06-15 RX ADMIN — GLYCOPYRROLATE 0.8 MG: 0.2 INJECTION, SOLUTION INTRAMUSCULAR; INTRAVENOUS at 18:13

## 2022-06-15 RX ADMIN — TILMANOCEPT 0.56 MILLICURIE: KIT at 11:44

## 2022-06-15 RX ADMIN — HYDROMORPHONE HYDROCHLORIDE 0.2 MG: 0.2 INJECTION, SOLUTION INTRAMUSCULAR; INTRAVENOUS; SUBCUTANEOUS at 19:15

## 2022-06-15 RX ADMIN — FENTANYL CITRATE 50 MCG: 50 INJECTION, SOLUTION INTRAMUSCULAR; INTRAVENOUS at 14:38

## 2022-06-15 RX ADMIN — PHENYLEPHRINE HYDROCHLORIDE 100 MCG: 10 INJECTION INTRAVENOUS at 15:52

## 2022-06-15 RX ADMIN — PHENYLEPHRINE HYDROCHLORIDE 100 MCG: 10 INJECTION INTRAVENOUS at 13:40

## 2022-06-15 RX ADMIN — TILMANOCEPT 0.58 MILLICURIE: KIT at 11:43

## 2022-06-15 RX ADMIN — FENTANYL CITRATE 100 MCG: 50 INJECTION, SOLUTION INTRAMUSCULAR; INTRAVENOUS at 12:56

## 2022-06-15 RX ADMIN — PHENYLEPHRINE HYDROCHLORIDE 200 MCG: 10 INJECTION INTRAVENOUS at 17:27

## 2022-06-15 RX ADMIN — SODIUM CHLORIDE, POTASSIUM CHLORIDE, SODIUM LACTATE AND CALCIUM CHLORIDE: 600; 310; 30; 20 INJECTION, SOLUTION INTRAVENOUS at 11:59

## 2022-06-15 RX ADMIN — PHENYLEPHRINE HYDROCHLORIDE 100 MCG: 10 INJECTION INTRAVENOUS at 15:36

## 2022-06-15 RX ADMIN — HYDROMORPHONE HYDROCHLORIDE 0.2 MG: 0.2 INJECTION, SOLUTION INTRAMUSCULAR; INTRAVENOUS; SUBCUTANEOUS at 18:59

## 2022-06-15 RX ADMIN — Medication 2 G: at 16:56

## 2022-06-15 RX ADMIN — SODIUM CHLORIDE, POTASSIUM CHLORIDE, SODIUM LACTATE AND CALCIUM CHLORIDE: 600; 310; 30; 20 INJECTION, SOLUTION INTRAVENOUS at 17:40

## 2022-06-15 RX ADMIN — HYDROMORPHONE HYDROCHLORIDE 0.4 MG: 0.2 INJECTION, SOLUTION INTRAMUSCULAR; INTRAVENOUS; SUBCUTANEOUS at 20:19

## 2022-06-15 RX ADMIN — PHENYLEPHRINE HYDROCHLORIDE 100 MCG: 10 INJECTION INTRAVENOUS at 14:52

## 2022-06-15 RX ADMIN — FENTANYL CITRATE 25 MCG: 50 INJECTION, SOLUTION INTRAMUSCULAR; INTRAVENOUS at 18:45

## 2022-06-15 RX ADMIN — Medication 2 G: at 13:00

## 2022-06-15 RX ADMIN — PROPOFOL 250 MG: 10 INJECTION, EMULSION INTRAVENOUS at 12:55

## 2022-06-15 RX ADMIN — MIDAZOLAM 2 MG: 1 INJECTION INTRAMUSCULAR; INTRAVENOUS at 12:54

## 2022-06-15 RX ADMIN — PHENYLEPHRINE HYDROCHLORIDE 200 MCG: 10 INJECTION INTRAVENOUS at 17:40

## 2022-06-15 RX ADMIN — METHOCARBAMOL 750 MG: 750 TABLET ORAL at 21:59

## 2022-06-15 ASSESSMENT — LIFESTYLE VARIABLES: TOBACCO_USE: 1

## 2022-06-15 ASSESSMENT — ENCOUNTER SYMPTOMS: DYSRHYTHMIAS: 0

## 2022-06-15 NOTE — BRIEF OP NOTE
[facility]    Brief Operative Note    Pre-operative diagnosis: Metaplastic carcinoma of breast (H) [C50.919]  Post-operative diagnosis Same as pre-operative diagnosis    Procedure: Procedure(s):  Bilateral skin sparing mastectomies  bilateral axillary sentinel lymph node biopsy, possible limited bilateral  axillary dissection  port placement  BILATERAL BREAST RECONSTRUCTION WITH TISSUE EXPANDERS  Surgeon: Surgeon(s) and Role:  Panel 1:     * Daria Sigala MD - Primary     * Jennifer Terry PA-C  Panel 2:     * Charles Ladd MD - Primary  Anesthesia: General   Estimated Blood Loss: 25 ml    Drains:  None from our procedure, see drains in Dr. Ladd's Op note  Specimens:   ID Type Source Tests Collected by Time Destination   1 : right breast suture at axillary tail Tissue Breast, Right SURGICAL PATHOLOGY EXAM Daria Sigala MD 6/15/2022  3:34 PM    2 : right axillary sentinal lymph node #1 Tissue Lymph Node(s) [place], Breast, Right SURGICAL PATHOLOGY EXAM Daria Sigala MD 6/15/2022  3:46 PM    3 : left breast suture at axillary tail Tissue Breast, Left SURGICAL PATHOLOGY EXAM Daria Sigala MD 6/15/2022  5:00 PM    4 : left axillary sentinal lymph node #1 Tissue Lymph Node(s) [place], Breast, Left SURGICAL PATHOLOGY EXAM Daria Sigala MD 6/15/2022  5:07 PM      Findings:   Left sided port placement, tip at AC junction. Right skin sparing mastectomy with one sentinel lymph node obtained. Left skin sparing mastectomy with one sentinel lymph node obtained.  Complications: None.  Implants:   Implant Name Type Inv. Item Serial No.  Lot No. LRB No. Used Action   CATH PORT POWERPORT CLEARVUE ISP [id number] - [id number] Catheter CATH PORT POWERPORT CLEARVUE ISP [id number]  [employer] [id number] Left 1 Implanted

## 2022-06-15 NOTE — OP NOTE
Procedure Date: 06/15/2022    SURGEON:  Charles Ladd MD    ASSISTANT:  Elisabeth Ceja PA-C     PREOPERATIVE DIAGNOSIS:  Breast cancer with acquired absence of bilateral breasts.    POSTOPERATIVE DIAGNOSIS:  Breast cancer with acquired absence of bilateral breasts.    PROCEDURES:    1.  Bilateral first-stage breast reconstruction with placement of tissue expanders.  2.  Implantation of acellular dermal matrix, bilateral chest wall for soft tissue support.  3.  Laser angiography.      A surgical first assistant was medically necessary for patient positioning, retraction and visualization of anatomic structures, hemostasis, and suture closure of incisions.    TECHNIQUE:  The patient was marked preoperatively.  She was supine on the operating table, having undergone a skin-sparing mastectomy by Dr. Sigala.  The margins were clear.  A timeout was done.  I began on the left side.  I reconstituted the borders of the breast pocket with internal sutures of 2-0 Vicryl, and then I affixed a medium contour sheet of AlloDerm with 2-0 Vicryl along the lower pole for soft tissue support.  I irrigated the wound with dilute Betadine and achieved a final hemostasis with electrocautery.  The patient was given 10 mg of indocyanine green, and the skin was imaged with an infrared camera.  This showed excellent perfusion throughout.  Therefore, a prepectoral reconstruction was chosen.  I affixed an [employer] style 133FX smooth tab 550 tissue expander to the prepectoral fascia with 2-0 silk after removing the air and filling with 350 of saline.  The AlloDerm was draped over the lower pole of the device and affixed to the backside of the upper mastectomy flap with 2-0 Vicryl.  A drain was placed and sewn in with 2-0 silk.  Skin was closed with 3-0 and 4-0 Vicryl.  The same technique was used on the right side to control the mastectomy defect, affix the AlloDerm, and prepare and place an identical style and size of expander, placed in a  symmetrical position.  The same closure was performed, including expander fixation, AlloDerm draping and skin closure over drain.  There was good symmetry.  There was good soft tissue viability.  Sterile dressings were applied.  Anesthesia was reversed.  The patient was taken to the recovery room in satisfactory condition.    ESTIMATED BLOOD LOSS:  15 mL.    COUNTS:  Sponge and needle counts were correct.    SPECIMENS:  None.    FINDINGS:  Noted above.    Charles Ladd MD        D: 06/15/2022   T: 06/15/2022   MT: [facility]    Name:     YEMI HERZOGSugar  MRN:      [id number]        Account:        [id number]   :      1970           Procedure Date: 06/15/2022     Document: [id number]

## 2022-06-15 NOTE — PROGRESS NOTES
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

## 2022-06-15 NOTE — ANESTHESIA CARE TRANSFER NOTE
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    Anesthesia Type:   General     Note:    Oropharynx: oropharynx clear of all foreign objects  Level of Consciousness: awake  Oxygen Supplementation: face mask  Level of Supplemental Oxygen (L/min / FiO2): 8  Independent Airway: airway patency satisfactory and stable  Dentition: dentition unchanged  Vital Signs Stable: post-procedure vital signs reviewed and stable  Report to RN Given: handoff report given  Patient transferred to: PACU    Handoff Report: Identifed the Patient, Identified the Reponsible Provider, Reviewed the pertinent medical history, Discussed the surgical course, Reviewed Intra-OP anesthesia mangement and issues during anesthesia, Set expectations for post-procedure period and Allowed opportunity for questions and acknowledgement of understanding      Vitals:  Vitals Value Taken Time   BP     Temp     Pulse     Resp     SpO2         Electronically Signed By: JIMBO Mann CRNA  Zenia 15, 2022  6:20 PM

## 2022-06-15 NOTE — PROGRESS NOTES
PTA medications updated by Medication Scribe prior to surgery via phone call with patient (last doses completed by Nurse)     Medication history sources: Patient and H&P  In the past week, patient estimated taking medication this percent of the time: Greater than 90%  Adherence assessment: N/A Not Observed    Significant changes made to the medication list:  None      Additional medication history information:   None    Medication reconciliation completed by provider prior to medication history? No    Time spent in this activity: 30 MINUTES    The information provided in this note is only as accurate as the sources available at the time of update(s)      Prior to Admission medications    Medication Sig Last Dose Taking? Auth Provider Long Term End Date   multivitamin w/minerals (THERA-VIT-M) tablet Take 1 tablet by mouth daily  at AM Yes Reported, Patient     paragard intrauterine copper device 1 each by Intrauterine route once  Yes Betina Pratt MD     vitamin C (ASCORBIC ACID) 1000 MG TABS Take 1,000 mg by mouth daily  at AM Yes Reported, Patient

## 2022-06-15 NOTE — ANESTHESIA PROCEDURE NOTES
Other (PEC 1 and 2 bilaterally) Procedure Note    Pre-Procedure   Staff -        Anesthesiologist:  Mehran Rees MD       Performed By: anesthesiologist       Location: OR       Pre-Anesthestic Checklist: patient identified, IV checked, site marked, risks and benefits discussed, informed consent, monitors and equipment checked, pre-op evaluation, at physician/surgeon's request and post-op pain management  Timeout:       Correct Patient: Yes        Correct Procedure: Yes        Correct Site: Yes        Correct Position: Yes        Correct Laterality: Yes        Site Marked: Yes  Procedure Documentation  Procedure: Other (PEC 1 and 2 bilaterally)       Laterality: bilateral       Patient Position: supine       Patient Prep/Sterile Barriers: sterile gloves, mask, patient draped       Skin prep: Chloraprep       Local skin infiltrated with 3 mL of 1% lidocaine.        Needle Type: insulated       Needle Gauge: 21.        Needle Length (Inches): 3.5        Ultrasound guided       1. Ultrasound was used to identify targeted nerve, plexus, vascular marker, or fascial plane and place a needle adjacent to it in real-time.       2. Ultrasound was used to visualize the spread of anesthetic in close proximity to the above referenced structure.       3. A permanent image is entered into the patient's record.       4. The visualized anatomic structures appeared normal.       5. There were no apparent abnormal pathologic findings.    Assessment/Narrative         The placement was negative for: blood aspirated, painful injection and site bleeding       Paresthesias: No.       Bolus given via needle..        Secured via.        Insertion/Infusion Method: Single Shot       Complications: none       Injection made incrementally with aspirations every 5 mL.    Medication(s) Administered   Ropivacaine 0.5% w/ 1:400K Epi (Injection) - Injection   45 mL - 6/15/2022 1:08:00 PM   Comments:  The surgeon has given a verbal order  transferring care of this patient to me for the performance of a regional analgesia block for post-op pain control. It is requested of me because I am uniquely trained and qualified to perform this block and the surgeon is neither trained nor qualified to perform this procedure.

## 2022-06-15 NOTE — ANESTHESIA PREPROCEDURE EVALUATION
Anesthesia Pre-Procedure Evaluation    Patient: Risa Alcaraz   MRN: [id number] : 1970        Procedure : Procedure(s):  Bilateral skin sparing mastectomies  bilateral axillary sentinel lymph node biopsy, possible limited bilateral  axillary dissection  port placement  BILATERAL BREAST RECONSTRUCTION WITH TISSUE EXPANDERS          Past Medical History:   Diagnosis Date     NO ACTIVE PROBLEMS       Past Surgical History:   Procedure Laterality Date     [facility] ORAL SURGERY PROCEDURE      wisdom teeth, hypotension with anesthesia      No Known Allergies   Social History     Tobacco Use     Smoking status: Former Smoker     Types: Cigarettes, Cigars     Smokeless tobacco: Never Used     Tobacco comment: Quit    Substance Use Topics     Alcohol use: Yes     Comment: occ      Wt Readings from Last 1 Encounters:   22 96.4 kg (212 lb 9.6 oz)        Anesthesia Evaluation            ROS/MED HX  ENT/Pulmonary:     (+) CATHIE risk factors, snores loudly, obese, allergic rhinitis, tobacco use, Past use,     Neurologic:  - neg neurologic ROS     Cardiovascular: Comment: Interpretation Summary     1. Normal biventricular size and function. Left ventricular ejection fraction  of 55-60%. No segmental wall motion abnormalities noted.  2. Normal sized atria.  3. No hemodynamically significant valvular disease.  4. Normal pulmonary artery pressure.  No prior study for comparison. Technically adequate study.   (-) CHF and arrhythmias   METS/Exercise Tolerance: >4 METS    Hematologic:       Musculoskeletal:       GI/Hepatic:  - neg GI/hepatic ROS     Renal/Genitourinary:  - neg Renal ROS     Endo:     (+) Obesity,     Psychiatric/Substance Use:       Infectious Disease:       Malignancy:       Other:            Physical Exam    Airway        Mallampati: II   TM distance: > 3 FB   Neck ROM: full   Mouth opening: > 3 cm    Respiratory Devices and Support         Dental  no notable dental history         Cardiovascular    cardiovascular exam normal          Pulmonary   pulmonary exam normal                OUTSIDE LABS:  CBC:   Lab Results   Component Value Date    WBC 7.3 06/03/2022    WBC 6.2 05/13/2022    HGB 12.9 06/03/2022    HGB 13.2 05/13/2022    HCT 38.9 06/03/2022    HCT 40.3 05/13/2022     06/03/2022     05/13/2022     BMP:   Lab Results   Component Value Date     06/03/2022     05/13/2022    POTASSIUM 4.1 06/03/2022    POTASSIUM 4.5 05/13/2022    CHLORIDE 110 (H) 06/03/2022    CHLORIDE 109 05/13/2022    CO2 23 06/03/2022    CO2 23 05/13/2022    BUN 12 06/03/2022    BUN 13 05/13/2022    CR 0.75 06/03/2022    CR 0.98 05/13/2022    GLC 93 06/03/2022     (H) 05/13/2022     COAGS: No results found for: PTT, INR, FIBR  POC:   Lab Results   Component Value Date    BGM 91 10/26/2007    HCG Negative 02/20/2008     HEPATIC:   Lab Results   Component Value Date    ALT 20 05/13/2022    AST 17 05/13/2022    ALKPHOS 79 05/13/2022     OTHER:   Lab Results   Component Value Date    JAMIL 9.0 06/03/2022    TSH 3.69 01/30/2015       Anesthesia Plan    ASA Status:  2   NPO Status:  NPO Appropriate    Anesthesia Type: General.     - Airway: ETT   Induction: Intravenous, Propofol.   Maintenance: Balanced.   Techniques and Equipment:     - Airway: Video-Laryngoscope         Consents    Anesthesia Plan(s) and associated risks, benefits, and realistic alternatives discussed. Questions answered and patient/representative(s) expressed understanding.    - Discussed:     - Discussed with:  Patient         Postoperative Care    Pain management: IV analgesics, Peripheral nerve block (Single Shot), Multi-modal analgesia.   PONV prophylaxis: Ondansetron (or other 5HT-3), Dexamethasone or Solumedrol, Background Propofol Infusion     Comments:                Mehran Rees MD

## 2022-06-15 NOTE — ANESTHESIA PROCEDURE NOTES
Airway       Patient location during procedure: OR       Procedure Start/Stop Times: 6/15/2022 12:57 PM  Staff -        CRNA: Jean Claude Cox APRN CRNA       Other Anesthesia Staff: Danis Mitchell       Performed By: SRNAIndications and Patient Condition       Indications for airway management: cristiana-procedural       Induction type:intravenous       Mask difficulty assessment: 1 - vent by mask    Final Airway Details       Final airway type: endotracheal airway       Successful airway: ETT - single  Endotracheal Airway Details        ETT size (mm): 7.0       Cuffed: yes       Successful intubation technique: video laryngoscopy       VL Blade Size: Glidescope 3       Grade View of Cords: 1       Adjucts: stylet       Position: Right       Measured from: gums/teeth       Secured at (cm): 22       Bite block used: None    Post intubation assessment        Placement verified by: capnometry, equal breath sounds and chest rise        Number of attempts at approach: 1       Number of other approaches attempted: 0       Secured with: pink tape       Ease of procedure: easy       Dentition: Intact and Unchanged    Medication(s) Administered   Medication Administration Time: 6/15/2022 12:57 PM

## 2022-06-16 VITALS
DIASTOLIC BLOOD PRESSURE: 71 MMHG | BODY MASS INDEX: 37.03 KG/M2 | TEMPERATURE: 98.1 F | RESPIRATION RATE: 16 BRPM | OXYGEN SATURATION: 98 % | HEIGHT: 63 IN | SYSTOLIC BLOOD PRESSURE: 130 MMHG | WEIGHT: 209 LBS | HEART RATE: 76 BPM

## 2022-06-16 LAB — GLUCOSE BLDC GLUCOMTR-MCNC: 118 MG/DL (ref 70–99)

## 2022-06-16 PROCEDURE — 38525 BIOPSY/REMOVAL LYMPH NODES: CPT | Mod: 50 | Performed by: SURGERY

## 2022-06-16 PROCEDURE — 77001 FLUOROGUIDE FOR VEIN DEVICE: CPT | Mod: 26 | Performed by: SURGERY

## 2022-06-16 PROCEDURE — 19303 MAST SIMPLE COMPLETE: CPT | Mod: 50 | Performed by: SURGERY

## 2022-06-16 PROCEDURE — 82962 GLUCOSE BLOOD TEST: CPT

## 2022-06-16 PROCEDURE — 250N000011 HC RX IP 250 OP 636

## 2022-06-16 PROCEDURE — 36561 INSERT TUNNELED CV CATH: CPT | Performed by: SURGERY

## 2022-06-16 PROCEDURE — 19303 MAST SIMPLE COMPLETE: CPT | Mod: AS | Performed by: PHYSICIAN ASSISTANT

## 2022-06-16 PROCEDURE — 38900 IO MAP OF SENT LYMPH NODE: CPT | Mod: 50 | Performed by: SURGERY

## 2022-06-16 PROCEDURE — 76937 US GUIDE VASCULAR ACCESS: CPT | Mod: 26 | Performed by: SURGERY

## 2022-06-16 PROCEDURE — 250N000013 HC RX MED GY IP 250 OP 250 PS 637

## 2022-06-16 RX ORDER — ONDANSETRON 4 MG/1
4 TABLET, ORALLY DISINTEGRATING ORAL EVERY 6 HOURS PRN
Qty: 10 TABLET | Refills: 0 | Status: SHIPPED | OUTPATIENT
Start: 2022-06-16 | End: 2022-10-14

## 2022-06-16 RX ORDER — CEPHALEXIN 500 MG/1
500 CAPSULE ORAL 3 TIMES DAILY
Qty: 21 CAPSULE | Refills: 0 | Status: SHIPPED | OUTPATIENT
Start: 2022-06-16 | End: 2022-06-29

## 2022-06-16 RX ORDER — OXYCODONE HYDROCHLORIDE 5 MG/1
5 TABLET ORAL EVERY 6 HOURS PRN
Qty: 20 TABLET | Refills: 0 | Status: SHIPPED | OUTPATIENT
Start: 2022-06-16 | End: 2022-06-29

## 2022-06-16 RX ORDER — METHOCARBAMOL 750 MG/1
750 TABLET, FILM COATED ORAL 4 TIMES DAILY PRN
Qty: 30 TABLET | Refills: 0 | Status: SHIPPED | OUTPATIENT
Start: 2022-06-16 | End: 2022-06-29

## 2022-06-16 RX ADMIN — METHOCARBAMOL 750 MG: 750 TABLET ORAL at 03:59

## 2022-06-16 RX ADMIN — OXYCODONE HYDROCHLORIDE 5 MG: 5 TABLET ORAL at 12:33

## 2022-06-16 RX ADMIN — ACETAMINOPHEN 975 MG: 325 TABLET ORAL at 05:16

## 2022-06-16 RX ADMIN — CEFAZOLIN SODIUM 2 G: 2 INJECTION, SOLUTION INTRAVENOUS at 05:16

## 2022-06-16 RX ADMIN — METHOCARBAMOL 750 MG: 750 TABLET ORAL at 09:51

## 2022-06-16 RX ADMIN — POLYETHYLENE GLYCOL 3350 17 G: 17 POWDER, FOR SOLUTION ORAL at 08:20

## 2022-06-16 RX ADMIN — IBUPROFEN 600 MG: 600 TABLET ORAL at 09:51

## 2022-06-16 RX ADMIN — IBUPROFEN 600 MG: 600 TABLET ORAL at 02:58

## 2022-06-16 RX ADMIN — SENNOSIDES AND DOCUSATE SODIUM 1 TABLET: 50; 8.6 TABLET ORAL at 08:20

## 2022-06-16 RX ADMIN — OXYCODONE HYDROCHLORIDE 10 MG: 5 TABLET ORAL at 03:59

## 2022-06-16 NOTE — PROGRESS NOTES
Plastic Surgery POD# 1    Patient feeling well. Pain has been controlled overnight with PRN medications, currently taking only PO narcotics. No nausea. Has eaten pudding and applesauce, feeling hungry for breakfast. Drinking fine. Able to ambulate to bathroom with some dizziness that resolves with sitting back down. VSS overnight.     Flap healthy. No hematoma. Soft. Drain output serosanguineous.     Plan: Home today. Fully instructed.     Elisabeth Ceja PA-C on 6/16/2022 at 6:28 AM

## 2022-06-16 NOTE — OP NOTE
Procedure Date: 06/15/2022    STAFF SURGEON:  Daria Sigala MD    ASSISTANT:  Jennifer Terry PA-C.    PREOPERATIVE DIAGNOSIS:  Bilateral breast cancer.    POSTOPERATIVE DIAGNOSIS:  Bilateral breast cancer.    PROCEDURE PERFORMED:    1.  Bilateral skin-sparing mastectomies with bilateral axillary sentinel lymph node biopsies.  2.  Port placement.    INDICATIONS FOR OPERATION:  Risa is a 51-year-old female who was undergoing her annual screening mammogram when she was noted to have a possible architectural distortion at the central aspect of the right breast.  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  The patient then underwent bilateral breast MRI, which also demonstrated a large area of nodular non-mass enhancement at the 12 o'clock position of the left breast.  Biopsy of this tissue was completed and pathology demonstrated ductal carcinoma in situ.  The patient was seen by Oncology.  The decision was made to proceed with surgical intervention followed by adjuvant chemotherapy.  Port placement was recommended.  Due to the findings of bilateral breast cancer, the patient ultimately decided to proceed with bilateral skin-sparing mastectomies with bilateral sentinel lymph node biopsies and immediate tissue expander reconstruction.  The risks and benefits of the procedure were discussed with the patient and consent for the procedure was obtained.    ANESTHESIA:  General.    DESCRIPTION OF THE PROCEDURE:  The patient was brought to the preoperative area and preoperative antibiotics were administered.  The patient underwent bilateral periareolar injections for bilateral sentinel lymph node biopsies.  The patient was taken back to the operating room and placed in the supine position on the operating table.  A timeout was completed.  Anesthesia was induced.  The patient was intubated.  The patient was secured to the  operating table and her pressure points were padded.  The patient underwent bilateral Pecs II blocks with the anesthesiologist.  The patient's upper chest and neck were then prepped and draped bilaterally.  The patient had been marked for port placement in the preoperative area.  We anesthetized the skin of the upper left chest.  The patient was placed in the Trendelenburg position.  Local anesthetic was injected into the skin in the lower neck and the upper chest for the port site and proposed tract for the catheter.  We made an incision in the skin in the neck.  We then cannulated the left internal jugular vein under ultrasound guidance.  A wire was then placed through the guide needle into the internal jugular vein and directed down towards the atriocaval junction.  Fluoroscopy was used and confirmed the wire to be traveling into the superior vena cava.  We then used a 15 blade scalpel to make a skin incision in the left upper chest wall.  The subcutaneous tissue was divided with cautery.  The fascia was identified.  A pocket was created above the fascia utilizing blunt dissection and the electrocautery.  The port fit well into the pocket.  We then used a tunneling device to place the catheter through the incision in the chest and tunnel it up to the neck incision.  We then passed a dilator over the guidewire under fluoroscopic guidance.  The catheter was then placed through the catheter introducer and threaded using fluoroscopic guidance until the tip of the catheter was at the atriocaval junction.  The catheter introducer was removed.  The port was flushed with heparinized saline.  We attached the catheter to the support and secured it in place with the catheter hub.  The port was then secured to the chest wall fascia with two 2-0 Prolene sutures on either side of the port.  A final flush of full strength heparin (5 mL) was injected into the port utilizing the Day needle.  The patient's subcutaneous tissues  were then reapproximated with interrupted 3-0 Vicryl sutures.  The skin of the chest and neck incisions were closed using 4-0 Monocryl subcuticular stitches.  Skin glue was applied to both the neck and chest incisions.  The patient tolerated this portion of the procedure well.  The patient was then reprepped and draped.  Her bilateral breasts and bilateral axillae were prepped and draped.  We began on the patient's right side.  An elliptical incision was created encompassing the nipple areolar complex.  Dissection was carried out down through the subcutaneous tissues utilizing the electrocautery.  We then worked in the relatively avascular plane between the deep dermal tissues and the underlying breast tissue to carefully dissect out the patient's right breast tissue circumferentially.  When the breast tissue had been dissected out, we continued our dissection down onto the level of the chest wall circumferentially.  The patient's right breast tissue was then dissected directly off the underlying chest wall musculature in a medial to lateral fashion utilizing the electrocautery.  The breast tissue was amputated out laterally utilizing the electrocautery.  When the right breast tissue had been dissected free, it was marked at the axillary tail for proper orientation and passed off the table for pathologic evaluation.  Attention was then turned to the patient's right axilla.  The [employer] probe was used to direct our dissection.  We identified a single right axillary lymph node with significant counts.  This was carefully dissected free from the surrounding tissues utilizing the electrocautery and it was passed off the table for pathologic evaluation.  The remainder of the patient's right axilla was inspected.  There were no additional palpable nodes or nodes with counts greater than 10% of the hottest sentinel node.  The patient's right breast surgical site was inspected and hemostasis was ensured utilizing the  electrocautery.  A saline-moistened lap was then placed overlying our surgical site.  Attention was then turned to the patient's left breast.  In a similar fashion, the 10 blade scalpel was used to make an elliptical incision encompassing the patient's left nipple areolar complex.  Dissection was carried out down through the subcutaneous tissues utilizing the electrocautery.  Skin flaps were then raised circumferentially, working in the relatively avascular plane between the deep dermal tissues and the underlying breast tissue.  The patient's breast tissue was dissected out circumferentially and then we continued our dissection down onto the level of the chest wall circumferentially.  The patient's left breast tissue was then dissected directly off the underlying chest wall musculature in a medial to lateral fashion utilizing the electrocautery.  When the patient's left breast tissue had been dissected free, it was amputated at the level of the axillary tail utilizing the electrocautery.  The breast tissue was marked at the axillary tail with a stitch for proper orientation and it was passed off the table for pathologic evaluation.  The patient's left axilla was then inspected.  The [employer] probe was used to direct our dissection.  We identified a single left axillary sentinel lymph node with significant counts.  This was carefully dissected free from the surrounding tissues utilizing the electrocautery.  The node was passed off the table for pathologic evaluation.  The remainder of the patient's left axilla was inspected.  There were no additional palpable nodes or nodes with greater than 10% of the hottest sentinel node.  The patient's left axillary and left breast surgical sites were inspected and hemostasis was ensured utilizing the electrocautery.  The wound was then covered with a saline moistened lap.  At this time, Dr. Ladd entered the operating room to proceed with bilateral tissue expander based  reconstruction.  Please refer to his operative report for details.  At the conclusion of our portion of the procedure, the patient was tolerating the procedure well under anesthesia and the lap, needle, and instrument counts were correct.    Jennifer Terry PA-C assisted in the above procedure. They provided assistance with pre-operative positioning, prepping, and draping of the patient. The assistant provided vital operative assistance with retraction using instruments thus providing the necessary exposure and visualization for the case, manipulation of tissues to achieve hemostasis, suction for visualization.      COMPLICATIONS:  None.    SPECIMENS:    1.  Right breast.  2.  Right axillary sentinel lymph node #1.    3.  Left breast.  4.  Left axillary sentinel lymph node #1.    ESTIMATED BLOOD LOSS:  100 mL.    FINDINGS:  Bilateral simple skin-sparing mastectomies performed.  A single axillary sentinel lymph node was dissected on both the right and left side.    DRAINS:  None.    CONDITION:  The patient will be admitted postoperatively to the surgical floor with instructions for postoperative cares and medications for pain management.    Daria Sigala MD        D: 2022   T: 2022   MT: [facility]    Name:     YEMI HERZOGSugar  MRN:      [id number]        Account:        [id number]   :      1970           Procedure Date: 06/15/2022     Document: [id number]

## 2022-06-16 NOTE — ANESTHESIA POSTPROCEDURE EVALUATION
Patient: Risa Alcaraz    Procedure: Procedure(s):  BILATERAL SKIN SPARING MASTECTOMIES  BILATERAL AXILLARY SENTINEL LYMPH NODE BIOPSIES  PORT PLACEMENT  BILATERAL BREAST RECONSTRUCTION WITH TISSUE EXPANDERS       Anesthesia Type:  General    Note:  Disposition: Outpatient   Postop Pain Control: Uneventful            Sign Out: Well controlled pain   PONV: No   Neuro/Psych: Uneventful            Sign Out: Acceptable/Baseline neuro status   Airway/Respiratory: Uneventful            Sign Out: Acceptable/Baseline resp. status   CV/Hemodynamics: Uneventful            Sign Out: Acceptable CV status   Other NRE:    DID A NON-ROUTINE EVENT OCCUR? No           Last vitals:  Vitals Value Taken Time   /69 06/15/22 1940   Temp     Pulse 61 06/15/22 1940   Resp 15 06/15/22 1940   SpO2 100 % 06/15/22 1940   Vitals shown include unvalidated device data.    Electronically Signed By: Judi Helms  Zenia 15, 2022  9:23 PM

## 2022-06-16 NOTE — PLAN OF CARE
"Goal Outcome Evaluation:        Summary:       POD:0  /70 (BP Location: Right leg)   Pulse 65   Temp 98.2  F (36.8  C) (Oral)   Resp 16   Ht 1.6 m (5' 3\")   Wt 94.8 kg (209 lb)   LMP 05/10/2022 (Approximate)   SpO2 99%   BMI 37.02 kg/m    A&Ox4, VSS, on 2LPM via NC. Dressing CDI, Pain managed with scheduled and PRN medications. Up with SB assist of 1 GB and walker. Steady on feet. 1st time up ambulating to bathroom client did have episode of dizziness that resolved with no further instance. Voiding adequate output. Bilat MONICA with closed to bulb suction serosanguinous output . Tolerating regular diet no c/o N/V.               "

## 2022-06-16 NOTE — PLAN OF CARE
Goal Outcome Evaluation:    Plan of Care Reviewed With: patient, spouse     Patient discharged at 1 PM to home. IV was discontinued. VSS, on RA. PRN oxy given before discharge for pain. Tolerating regular diet. Denies N/V. Voiding adequately in BR. BS hypo, no flatus, no BM. Up with SBA/indep, ambulated halls with . Bilat chest drsg CDI and camisole in place. Bilat chest MONICA drains bulb to suction with moderate sanguineous OP. MONICA drain supplies given and teaching/handout given. Belongings returned to patient.  Discharge instructions and medications reviewed with patient.  Patient verbalized understanding and all questions were answered.  Discharge meds given to patient.  At time of discharge, patient condition was stable and left the unit on WC escorted by EDSON.

## 2022-06-16 NOTE — DISCHARGE INSTRUCTIONS
[facility]  Discharge Instructions: Post-Operative Mastectomy with Reconstruction    ACTIVITY  Take frequent, short walks and increase your activity gradually.    Avoid strenuous physical activity or heavy lifting greater than 10 pounds. You may climb stairs.   Gentle rotation and stretching of your arms and shoulders will prevent joint stiffness.  You may drive without restrictions when you are not using any prescription pain medication and feel comfortable in a car.  You may return to work/school when you are comfortable without any prescription pain medication.    DRAIN CARE  You have drains at your surgical site.  In order to empty these, open the tab/vent on the drain and record how much liquid you remove. To properly close the drain, squeeze the bulb (with tab/vent open) then close the tab while still squeezing the bulb. You should notice that liquid moves in the tubing toward the bulb if it is properly closed.   You should also strip the drain tubing once daily to avoid any clogging. You do this by gently pinching the drain, starting near the skin, and stretching the tubing out this way until you reach the bulb. Do not pull hard on the drain tubing to the point of pulling the drain away from the skin.     DIET  Start with liquids, then gradually resume your regular diet as tolerated.   Drink plenty of fluids to stay hydrated.    PAIN  Expect some tenderness and discomfort at the incision site(s).  Use the prescribed pain medication at your discretion.  Expect gradual resolution of your pain over several days.  Take your muscle relaxant (Robaxin) as directed.  Take oxycodone (narcotic) as needed.  You may take Tylenol 500 - 1000 mg every 6 hours as needed for pain - do not exceed 4,000 mg of tylenol (acetaminophen) from all sources in 24 hours.  You may take Ibuprofen 400 - 600 mg every 6 hours as needed for pain - do not exceed 2,400 mg of ibuprofen from all sources in  24 hours. Do not use Ibuprofen for any longer than necessary or take if you have been told not to by another medical provider.  You may alternate Ibuprofen and Tylenol every 3 hours as needed for pain. Reserve the narcotic prescription for refractory pain.  Do not drink alcohol or drive while you are taking pain medications.  You may apply ice to your incisions in 20 minute intervals as needed.    EXPECTATIONS  Pain medications can cause constipation.  Limit use when possible.  Take the prescribed stool softener/stimulant twice daily as needed. You may take a mild over the counter laxative, such as Miralax or a Dulcolax suppository, as needed.   You may discontinue these medications once you are having regular bowel movements and/or are no longer taking your narcotic pain medication.  Blue dye may have been used during your surgery to locate lymph nodes and can cause your urine to be blue/green for several days after surgery.  This is not a cause for concern and will resolve on its own.     RETURN APPOINTMENT  Follow-up with Dr. Ladd next week as directed.  Follow-up with Dr. Gomez in approximately 2 weeks. Call our office at [phone] to schedule this appointment.    CALL DR GOMEZ'S OR DR LADD'S OFFICE IF YOU HAVE:   Chills or fever above 101 F.  Increased redness, warmth, or drainage at your incisions.  Significant bleeding or swelling.  Pain not relieved by your pain medication or rest.  Increasing pain after the first 48 hours.  Any other concerns or questions.    **If you have concerns or questions about your procedure,    please contact Dr. Gomez at  [phone]**

## 2022-06-16 NOTE — PROGRESS NOTES
"[facility]  General Surgery Progress Note    Admission Date: 6/15/2022  2022         Assessment and Plan:     Risa Alcaraz is a 51 year old female 1 day s/p:    Bilateral skin sparing mastectomies with bilateral axillary sentinel lymph node biopsy by Dr. Sigala    Bilateral breast reconstruction with tissue expanders by Dr. Ladd    - Pain controlled  - Advance diet this morning  - Ambulate 4x day and encourage IS  - MONICA drain teaching by nursing when  present  - DC instructions discussed     PLAN:  - Discharge home today with keflex, robaxin, oxycodone, and zofran. Patient has stool softeners at home.  - Follow up with Dr. Ladd next week as instructed. Follow up with Dr. Sigala in 2 weeks.             Interval History:     Risa Alcaraz is seen on surgical rounds. Pain controlled with oxycodone, tylenol and robaxin. Tolerating liquid diet without nausea. Urinating without difficulty, ambulating with assist. Has felt lightheaded but this is greatly improving. States lightheaded with even tylenol in the past. Meds reviewed. Normotensive, afebrile.                     Physical Exam:   Blood pressure 131/70, pulse 65, temperature 98.2  F (36.8  C), temperature source Oral, resp. rate 16, height 1.6 m (5' 3\"), weight 94.8 kg (209 lb), last menstrual period 05/10/2022, SpO2 99 %.  Temperature Temp  Av.8  F (36.6  C)  Min: 97.2  F (36.2  C)  Max: 98.2  F (36.8  C)   I/O last 3 completed shifts:  In:  [I.V.:]  Out: 480 [Urine:400; Drains:80]    GENERAL: VS reviewed, alert, oriented, no acute distress  LUNGS: Normal respiratory effort, no wheezing  ABDOMEN:  Soft, non-distended  CHEST: No palpable seroma/hematoma. MONICA drain intact without leakage, serosanguinous. Right- 50 ml since placement, Left- 30 ml since placement  INCISION: Clean, dry, and intact. No surrounding erythema.  EXTREMITIES: Moving all extremities, no gross deformities, well perfused, no lower extremity edema " or tenderness  NEUROLOGICAL: Grossly non-focal, mood & affect appropriate         Data:   No new labs/imaging.   Recent Labs   Lab Test 06/03/22  1527 05/13/22  1016   WBC 7.3 6.2   HGB 12.9 13.2   HCT 38.9 40.3    229      Recent Labs   Lab Test 06/03/22  1527 05/13/22  1016   POTASSIUM 4.1 4.5   CHLORIDE 110* 109   CO2 23 23   BUN 12 13   CR 0.75 0.98                    No lab results found.     Jennifer Terry PA-C  [facility]  [phone]

## 2022-06-16 NOTE — DISCHARGE SUMMARY
DISCHARGE SUMMARY    PRINCIPAL DISCHARGE DIAGNOSIS: Breast cancer     Operative procedures: Bilateral mastectomy/tissue expanders     REASON FOR ADMISSION: Breast cancer    HOSPITAL COURSE: The patient was taken to the operating room on her day of admission and tolerated the procedure well. Diet and activity were gradually advanced and she was discharged on POD# 1.     DISCHARGE INSTRUCTIONS:   Diet: Regular   Activity: No heavy strenuous physical activity, 10 lb lifting limit  Wound/Drain Care: Instructions given       Review of your medicines      START taking      Dose / Directions   cephALEXin 500 MG capsule  Commonly known as: KEFLEX      Dose: 500 mg  Take 1 capsule (500 mg) by mouth 3 times daily  Quantity: 21 capsule  Refills: 0     methocarbamol 750 MG tablet  Commonly known as: ROBAXIN      Dose: 750 mg  Take 1 tablet (750 mg) by mouth 4 times daily as needed for muscle spasms  Quantity: 30 tablet  Refills: 0     oxyCODONE 5 MG tablet  Commonly known as: ROXICODONE      Dose: 5 mg  Take 1 tablet (5 mg) by mouth every 6 hours as needed for pain  Quantity: 20 tablet  Refills: 0        CONTINUE these medicines which have NOT CHANGED      Dose / Directions   acetaminophen 325 MG tablet  Commonly known as: TYLENOL      Dose: 325-650 mg  Take 325-650 mg by mouth every 6 hours as needed for mild pain  Refills: 0     multivitamin w/minerals tablet      Dose: 1 tablet  Take 1 tablet by mouth daily  Refills: 0     paragard intrauterine copper device  Used for: Encounter for insertion of intrauterine contraceptive device      Dose: 1 each  1 each by Intrauterine route once  Refills: 0     vitamin C 1000 MG Tabs  Commonly known as: ASCORBIC ACID      Dose: 1,000 mg  Take 1,000 mg by mouth daily  Refills: 0           Where to get your medicines      These medications were sent to [facility] [place]  [address]  [address] [address]  [address] [address] [address]    Phone: [phone]      cephALEXin 500 MG capsule    methocarbamol 750 MG tablet    oxyCODONE 5 MG tablet         Condition at Discharge: Wounds clean and dry, no sign of infection or hematoma. Normal/stable vitals signs. Pt ambulatory and able to take a regular diet well. Tissues viable. Comfortable on oral meds.     Elisabeth Ceja PA-C on 6/16/2022 at 6:35 AM

## 2022-06-22 LAB
PATH REPORT.COMMENTS IMP SPEC: NORMAL
PATH REPORT.COMMENTS IMP SPEC: NORMAL
PATH REPORT.FINAL DX SPEC: NORMAL
PATH REPORT.GROSS SPEC: NORMAL
PATH REPORT.MICROSCOPIC SPEC OTHER STN: NORMAL
PATH REPORT.MICROSCOPIC SPEC OTHER STN: NORMAL
PATH REPORT.RELEVANT HX SPEC: NORMAL
PATHOLOGY SYNOPTIC REPORT: NORMAL
PHOTO IMAGE: NORMAL

## 2022-06-23 ENCOUNTER — TELEPHONE (OUTPATIENT)
Dept: SURGERY | Facility: CLINIC | Age: 52
End: 2022-06-23

## 2022-06-23 NOTE — TELEPHONE ENCOUNTER
[facility] General Surgery:    Patient contacted and pathology discussed.  Doing well post-op.  No significant complaints.    Final Diagnosis   A.  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  - Extensive ductal carcinoma in situ, grade 1 and 2, predominantly cribriform and focal solid, with involvement of sclerosing adenosis and cancerization of lobules, and without extension to margins.  See synoptic report.     B.  Right axillary sentinel lymph node, excision:  - A single benign lymph node, negative for metastatic tumor.     C.  Left breast, simple mastectomy, with suture marked axillary tial:  - Extensive ductal carcinoma in situ involving extensive sclerosing adenosis, without extension to margins.  - No evidence of invasive carcinoma.  See synoptic report.     D.  Left axillary sentinel lymph node, excision:  - A single benign lymph, negative for metastatic tumor.         Daria Sigala MD

## 2022-06-29 ENCOUNTER — PATIENT OUTREACH (OUTPATIENT)
Dept: ONCOLOGY | Facility: CLINIC | Age: 52
End: 2022-06-29

## 2022-06-29 ENCOUNTER — ONCOLOGY VISIT (OUTPATIENT)
Dept: ONCOLOGY | Facility: CLINIC | Age: 52
End: 2022-06-29
Attending: INTERNAL MEDICINE
Payer: COMMERCIAL

## 2022-06-29 VITALS
RESPIRATION RATE: 18 BRPM | BODY MASS INDEX: 37.14 KG/M2 | WEIGHT: 209.6 LBS | SYSTOLIC BLOOD PRESSURE: 138 MMHG | OXYGEN SATURATION: 96 % | DIASTOLIC BLOOD PRESSURE: 81 MMHG | HEART RATE: 80 BPM | TEMPERATURE: 98.5 F | HEIGHT: 63 IN

## 2022-06-29 DIAGNOSIS — C50.111 MALIGNANT NEOPLASM OF CENTRAL PORTION OF RIGHT BREAST IN FEMALE, ESTROGEN RECEPTOR NEGATIVE (H): Primary | ICD-10-CM

## 2022-06-29 DIAGNOSIS — R91.8 PULMONARY NODULES: ICD-10-CM

## 2022-06-29 DIAGNOSIS — Z17.1 MALIGNANT NEOPLASM OF CENTRAL PORTION OF RIGHT BREAST IN FEMALE, ESTROGEN RECEPTOR NEGATIVE (H): Primary | ICD-10-CM

## 2022-06-29 DIAGNOSIS — D05.12 DUCTAL CARCINOMA IN SITU (DCIS) OF LEFT BREAST: ICD-10-CM

## 2022-06-29 PROCEDURE — 99215 OFFICE O/P EST HI 40 MIN: CPT | Performed by: INTERNAL MEDICINE

## 2022-06-29 PROCEDURE — G0463 HOSPITAL OUTPT CLINIC VISIT: HCPCS

## 2022-06-29 RX ORDER — ONDANSETRON 8 MG/1
8 TABLET, FILM COATED ORAL EVERY 8 HOURS PRN
Qty: 30 TABLET | Refills: 2 | Status: SHIPPED | OUTPATIENT
Start: 2022-07-12 | End: 2022-10-14

## 2022-06-29 RX ORDER — PROCHLORPERAZINE MALEATE 10 MG
10 TABLET ORAL EVERY 6 HOURS PRN
Qty: 30 TABLET | Refills: 2 | Status: SHIPPED | OUTPATIENT
Start: 2022-07-12 | End: 2022-10-14

## 2022-06-29 RX ORDER — DEXAMETHASONE 4 MG/1
8 TABLET ORAL 2 TIMES DAILY WITH MEALS
Qty: 6 TABLET | Refills: 3 | Status: SHIPPED | OUTPATIENT
Start: 2022-07-12 | End: 2022-10-14

## 2022-06-29 RX ORDER — IBUPROFEN 600 MG/1
600 TABLET, FILM COATED ORAL EVERY 6 HOURS PRN
COMMUNITY
End: 2022-10-14

## 2022-06-29 RX ORDER — LORAZEPAM 2 MG/ML
0.5 INJECTION INTRAMUSCULAR EVERY 4 HOURS PRN
Status: CANCELLED | OUTPATIENT
Start: 2022-07-13

## 2022-06-29 RX ORDER — METHYLPREDNISOLONE SODIUM SUCCINATE 125 MG/2ML
125 INJECTION, POWDER, LYOPHILIZED, FOR SOLUTION INTRAMUSCULAR; INTRAVENOUS
Status: CANCELLED
Start: 2022-07-13

## 2022-06-29 RX ORDER — DIPHENHYDRAMINE HYDROCHLORIDE 50 MG/ML
50 INJECTION INTRAMUSCULAR; INTRAVENOUS
Status: CANCELLED
Start: 2022-07-13

## 2022-06-29 RX ORDER — ONDANSETRON 2 MG/ML
8 INJECTION INTRAMUSCULAR; INTRAVENOUS ONCE
Status: CANCELLED | OUTPATIENT
Start: 2022-07-13

## 2022-06-29 RX ORDER — MEPERIDINE HYDROCHLORIDE 25 MG/ML
25 INJECTION INTRAMUSCULAR; INTRAVENOUS; SUBCUTANEOUS EVERY 30 MIN PRN
Status: CANCELLED | OUTPATIENT
Start: 2022-07-13

## 2022-06-29 RX ORDER — ALBUTEROL SULFATE 90 UG/1
1-2 AEROSOL, METERED RESPIRATORY (INHALATION)
Status: CANCELLED
Start: 2022-07-13

## 2022-06-29 RX ORDER — ALBUTEROL SULFATE 0.83 MG/ML
2.5 SOLUTION RESPIRATORY (INHALATION)
Status: CANCELLED | OUTPATIENT
Start: 2022-07-13

## 2022-06-29 RX ORDER — HEPARIN SODIUM,PORCINE 10 UNIT/ML
5 VIAL (ML) INTRAVENOUS
Status: CANCELLED | OUTPATIENT
Start: 2022-07-13

## 2022-06-29 RX ORDER — NALOXONE HYDROCHLORIDE 0.4 MG/ML
0.2 INJECTION, SOLUTION INTRAMUSCULAR; INTRAVENOUS; SUBCUTANEOUS
Status: CANCELLED | OUTPATIENT
Start: 2022-07-13

## 2022-06-29 RX ORDER — EPINEPHRINE 1 MG/ML
0.3 INJECTION, SOLUTION INTRAMUSCULAR; SUBCUTANEOUS EVERY 5 MIN PRN
Status: CANCELLED | OUTPATIENT
Start: 2022-07-13

## 2022-06-29 RX ORDER — HEPARIN SODIUM (PORCINE) LOCK FLUSH IV SOLN 100 UNIT/ML 100 UNIT/ML
5 SOLUTION INTRAVENOUS
Status: CANCELLED | OUTPATIENT
Start: 2022-07-13

## 2022-06-29 ASSESSMENT — PAIN SCALES - GENERAL: PAINLEVEL: NO PAIN (0)

## 2022-06-29 NOTE — PROGRESS NOTES
"Oncology Rooming Note    June 29, 2022 12:38 PM   Risa Alcaraz is a 51 year old female who presents for:    Chief Complaint   Patient presents with     Oncology Clinic Visit     Initial Vitals: Resp 18   Ht 1.6 m (5' 3\")   Wt 95.1 kg (209 lb 9.6 oz)   LMP 05/10/2022 (Approximate)   BMI 37.13 kg/m   Estimated body mass index is 37.13 kg/m  as calculated from the following:    Height as of this encounter: 1.6 m (5' 3\").    Weight as of this encounter: 95.1 kg (209 lb 9.6 oz). Body surface area is 2.06 meters squared.  No Pain (0) Comment: Data Unavailable   Patient's last menstrual period was 05/10/2022 (approximate).  Allergies reviewed: Yes  Medications reviewed: Yes    Medications: Medication refills not needed today.  Pharmacy name entered into [employer]:    [facility] [place], [place] - [address] [facility] [address] [facility] [place], [place] - [address] [place] [address]    Clinical concerns: no      Sahra Cheng            "

## 2022-06-29 NOTE — PATIENT INSTRUCTIONS
1.  Arrange for Taxotere + Cytoxan + Onpro every 3 weeks x 4 cycles starting no earlier than 7/13/2022. Cycles 1 and 3 in [place] and cycles 2 and 4 in [place].  2.  Lab draw every 3 weeks x 4 cycles, prior to each chemo infusion.  3. RTC NP in [place] with cycles 1 and 3.  4. RTC MD (Dr. Mars) in [place] with cycles 2 and 4.

## 2022-06-29 NOTE — LETTER
6/29/2022         RE: Risa Alcaraz  [address] [address] [facility] [address]        Dear Colleague,    Thank you for referring your patient, Risa Alcaraz, to the [facility]. Please see a copy of my visit note below.    [facility]    Hematology/Oncology Established Patient Note      Today's Date: 6/29/22    Reason for follow-up: Right breast cancer.    HISTORY OF PRESENT ILLNESS: Risa Alcaraz is a 51 year old female who presents with the following oncologic history:  1.  4/22/2022: Mammogram showed distortion in the central right breast.  Left breast is negative.  2.  5/2/2022: Diagnostic right mammogram showed distortion in the retroareolar breast.  Targeted ultrasound of the right breast at 12:00, 2 cm from the nipple measured 1.8 x 1.3 cm.  Survey of axilla showed no evidence of adenopathy.  3.  5/3/2022: Ultrasound-guided needle biopsy of the right breast at 12:00 showed invasive mammary carcinoma with features of low-grade metaplastic carcinoma, fibromatosis type, associated DCIS, cribriform subtype, intermediate nuclear grade, ER negative, PA negative, HER2/radha FISH negative.  4. 5/24/2022: Breast MRI showed nonmass enhancement at the 12:00 position of the RIGHT breast corresponds to biopsy-proven malignancy and measures 4.3 x 2.3 x 3.2 cm. Nodular nonmass enhancement at the 12:00 position of the LEFT breast. No evidence of adenopathy.  5. 5/25/2022: CT chest/abdomen/pelvis showed 2 mm pulmonary nodule in right upper lobe posteriorly; mildly prominent prevascular lymph node between the right brachiocephalic and left common carotid arteries measures 8 mm, upper limits of normal; no definite evidence of metastatic disease. NM bone scan showed no bone metastases.  6. 6/1/2022: Left breast U/S showed hypoechoic area measuring 2 cm at 11:00, 5 cm from nipple corresponding to MRI finding. Biopsy of left breast lesion showed nuclear grade 1 DCIS; no evidence of  invasive carcinoma or LVI, ER and WV both 100% positive. After breast tumor board discussion, consensus was for patient to proceed with surgery upfront followed by adjuvant chemotherapy.  7. 6/9/2022: BRCA gene panel negative.  8. 6/15/2022: Underwent bilateral skin sparing mastectomies with bilateral axillary sentinel lymph node excision, port placement under care of Dr. Daria Sigala and reconstruction with tissue expanders with Dr. Charles Ladd.  Pathology showed 8 x 5 mm right breast invasive mammary carcinoma with features of low-grade fibromatosis like metaplastic carcinoma and without extension to margins.  Right breast tumor was multifocal with total of 3 foci: 8 mm, 3 mm, 2 mm.  Extensive DCIS, grade 1 and 2 with involvement of sclerosing adenosis and cancerization of lobules and without extension of margins.  A single right axillary lymph node is negative for metastatic disease.  Left breast showed extensive DCIS involving extensive sclerosing adenosis without extension to margins.  No evidence of invasive carcinoma in the left breast.  A single left axillary lymph node is negative for metastatic disease.    INTERVAL HISTORY:  Risa reports feeling well postop.    REVIEW OF SYSTEMS:   14 point ROS was reviewed and is negative other than as noted above in HPI.       HOME MEDICATIONS:  Current Outpatient Medications   Medication Sig Dispense Refill     multivitamin w/minerals (THERA-VIT-M) tablet Take 1 tablet by mouth daily       paragard intrauterine copper device 1 each by Intrauterine route once           ALLERGIES:  No Known Allergies      PAST MEDICAL HISTORY:  Past Medical History:   Diagnosis Date     NO ACTIVE PROBLEMS          PAST SURGICAL HISTORY:  Past Surgical History:   Procedure Laterality Date     [facility] ORAL SURGERY PROCEDURE      wisdom teeth, hypotension with anesthesia         SOCIAL HISTORY:  Social History     Socioeconomic History     Marital status:      Spouse name: Not on file  "    Number of children: Not on file     Years of education: Not on file     Highest education level: Not on file   Occupational History     Employer: [employer]   Tobacco Use     Smoking status: Former Smoker     Types: Cigarettes, Cigars     Smokeless tobacco: Never Used     Tobacco comment: Quit    Vaping Use     Vaping Use: Never used   Substance and Sexual Activity     Alcohol use: Yes     Comment: occ     Drug use: No     Sexual activity: Yes     Partners: Male     Birth control/protection: I.U.D.     Comment: mirena    Other Topics Concern     Parent/sibling w/ CABG, MI or angioplasty before 65F 55M? Not Asked   Social History Narrative     Not on file     Social Determinants of Health     Financial Resource Strain: Not on file   Food Insecurity: Not on file   Transportation Needs: Not on file   Physical Activity: Not on file   Stress: Not on file   Social Connections: Not on file   Intimate Partner Violence: Not on file   Housing Stability: Not on file         FAMILY HISTORY:  Family History   Problem Relation Age of Onset     Diabetes Mother      Hypertension Mother      Cancer Father         penial     Hypertension Father      Cancer Maternal Grandmother         liver     Heart Disease Maternal Grandfather         MI     Cancer Paternal Grandmother         brain     Heart Disease Paternal Grandfather         MI         PHYSICAL EXAM:  Vital signs:  /81   Pulse 80   Temp 98.5  F (36.9  C) (Oral)   Resp 18   Ht 1.6 m (5' 3\")   Wt 95.1 kg (209 lb 9.6 oz)   LMP 05/10/2022 (Approximate)   SpO2 96%   BMI 37.13 kg/m     ECO  GENERAL/CONSTITUTIONAL: No acute distress.  EYES:  No scleral icterus.  ENT/MOUTH: Neck supple. Mask in place.  RESPIRATORY: No audible cough or wheezing.   NEUROLOGIC: Alert, oriented, answers questions appropriately.  INTEGUMENTARY: No rashes or jaundice.        LABS:  CBC RESULTS: Recent Labs   Lab Test 22  1527   WBC 7.3   RBC 3.83   HGB 12.9   HCT 38.9 "   *   MCH 33.7*   MCHC 33.2   RDW 13.0        Last Comprehensive Metabolic Panel:  Sodium   Date Value Ref Range Status   06/03/2022 138 133 - 144 mmol/L Final     Potassium   Date Value Ref Range Status   06/03/2022 4.1 3.4 - 5.3 mmol/L Final     Chloride   Date Value Ref Range Status   06/03/2022 110 (H) 94 - 109 mmol/L Final     Carbon Dioxide (CO2)   Date Value Ref Range Status   06/03/2022 23 20 - 32 mmol/L Final     Anion Gap   Date Value Ref Range Status   06/03/2022 5 3 - 14 mmol/L Final     Glucose   Date Value Ref Range Status   06/03/2022 93 70 - 99 mg/dL Final   01/30/2015 95 70 - 99 mg/dL Final     Comment:     Effective 7/30/2014, the reference range for this assay has changed to reflect   new instrumentation/methodology.       Urea Nitrogen   Date Value Ref Range Status   06/03/2022 12 7 - 30 mg/dL Final     Creatinine   Date Value Ref Range Status   06/03/2022 0.75 0.52 - 1.04 mg/dL Final     GFR Estimate   Date Value Ref Range Status   06/03/2022 >90 >60 mL/min/1.73m2 Final     Comment:     Effective December 21, 2021 eGFRcr in adults is calculated using the 2021 CKD-EPI creatinine equation which includes age and gender (Luh et al., NEJ, DOI: 10.1056/MSGGyb2978695)     Calcium   Date Value Ref Range Status   06/03/2022 9.0 8.5 - 10.1 mg/dL Final     Alkaline Phosphatase   Date Value Ref Range Status   05/13/2022 79 40 - 150 U/L Final     ALT   Date Value Ref Range Status   05/13/2022 20 0 - 50 U/L Final     AST   Date Value Ref Range Status   05/13/2022 17 0 - 45 U/L Final       PATHOLOGY:  Reviewed as per HPI.    IMAGING:  Reviewed as per HPI.    ASSESSMENT/PLAN:  Risa Alcaraz is a 51 year old female with the following issues:  1.  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 Left breast DCIS  - Risa is now status post bilateral  mastectomies with negative surgical margins and no lymph node involvement.  We discussed her final pathology results today.   -- Given her triple negative left breast cancer,  I recommended adjuvant chemotherapy with Taxotere and Cytoxan every 3 months for a total of 4 cycles to reduce her risk of breast cancer recurrence.  - I discussed the potential adverse effects of Taxotere and Cytoxan, including but not limited to alopecia, cytopenias, fever, increased risk for infection, infusion reactions, rash, bowel or bladder dysfunction, nausea, emesis, peripheral neuropathy, fatigue.  She consents to adjuvant chemotherapy with TC.  - We will provide additional information on scalp cooling, which she is potentially interested in.  She wishes to undergo chemotherapy closer to home in [place].  She is receptive to seeing me for 2 of the 4 cycles of treatment.  She can see a nurse practitioner in [place] for the other 2 cycles.  - I did recommend cooling her hands and feet during the Taxotere infusions to reduce the risk of peripheral neuropathy.  - Discussed that her overall prognosis is still excellent given the lower tendency for the fibromatosis low-grade type of metaplastic cancer to metastasize distantly or to the lymph nodes.  - Discussed that surveillance following completion of chemotherapy would largely be based on history and physical exams with imaging and lab work-up reserved for any concerning signs or symptoms that could arise.    3. Indeterminate pulmonary nodule  --Will reevaluate with 6-month interval chest CT at the end of 11/2022 or early 12/2020.    Briseyda Mars MD  Hematology/Oncology  [facility]    Total time spent: 62 minutes in patient evaluation, counseling, documentation, and coordination of care.     Oncology Rooming Note    June 29, 2022 12:38 PM   Risa Alcaraz is a 51 year old female who presents for:    Chief Complaint   Patient presents with     Oncology Clinic  "Visit     Initial Vitals: Resp 18   Ht 1.6 m (5' 3\")   Wt 95.1 kg (209 lb 9.6 oz)   LMP 05/10/2022 (Approximate)   BMI 37.13 kg/m   Estimated body mass index is 37.13 kg/m  as calculated from the following:    Height as of this encounter: 1.6 m (5' 3\").    Weight as of this encounter: 95.1 kg (209 lb 9.6 oz). Body surface area is 2.06 meters squared.  No Pain (0) Comment: Data Unavailable   Patient's last menstrual period was 05/10/2022 (approximate).  Allergies reviewed: Yes  Medications reviewed: Yes    Medications: Medication refills not needed today.  Pharmacy name entered into [facility]:    [facility] [place], [place] - [address] [facility], [place] - [address] [place] [address]    Clinical concerns: no      Sahra Cheng                Again, thank you for allowing me to participate in the care of your patient.        Sincerely,        Briseyda Mars MD    "

## 2022-06-30 ENCOUNTER — PATIENT OUTREACH (OUTPATIENT)
Dept: ONCOLOGY | Facility: CLINIC | Age: 52
End: 2022-06-30

## 2022-06-30 DIAGNOSIS — C50.919 METAPLASTIC CARCINOMA OF BREAST (H): Primary | ICD-10-CM

## 2022-06-30 NOTE — PROGRESS NOTES
6/30/2022    Genetics referral placed on behalf of Dr. Sigala.    Lora Vanegas MS, [facility]  Licensed, Certified Genetic Counselor  [facility]

## 2022-07-06 ENCOUNTER — MYC MEDICAL ADVICE (OUTPATIENT)
Dept: MAMMOGRAPHY | Facility: CLINIC | Age: 52
End: 2022-07-06

## 2022-07-06 ENCOUNTER — OFFICE VISIT (OUTPATIENT)
Dept: SURGERY | Facility: CLINIC | Age: 52
End: 2022-07-06
Payer: COMMERCIAL

## 2022-07-06 DIAGNOSIS — Z98.890 POSTOPERATIVE STATE: Primary | ICD-10-CM

## 2022-07-06 PROCEDURE — 99024 POSTOP FOLLOW-UP VISIT: CPT | Performed by: SURGERY

## 2022-07-06 NOTE — PROGRESS NOTES
[facility]: Cancer Care Initial Note                                    Discussion with Patient:                                                      Did chemotherapy teaching with Risa today. Understands Taxotere and Cytoxan every 3 weeks x 4. Reviewed materials and discussed when to contact clinic.        Assessment:                                                      Initial           Intervention/Education provided during outreach:                                                       Take home meds released to pharmacy.  Patient to follow up as scheduled at next appt  Patient to call/[employer] message with updates    Signature:  Petrona Gonzalez RN

## 2022-07-06 NOTE — LETTER
July 6, 2022          Daria Sigala MD  [address] [place] [address]      RE:   Risa Alcaraz 1970      Dear Colleague,    Thank you for referring your patient, Risa Alcaraz, to [facility] at [facility]. Please see a copy of my visit note below.    Risa is a 51-year-old female who recently underwent bilateral simple mastectomies with bilateral axillary sentinel lymph node biopsies and bilateral tissue expander reconstruction.  A port was also placed at the time of surgery.  The patient has done very well postoperatively.  Her surgical drains have been removed.  She has no significant pain in her surgical sites.  No wound concerns.     Breast: Bilateral breast incisions healing well without erythema or drainage, no evidence of significant underlying hematoma, skin flaps warm and well perfused, port site incisions healing well without evidence of infection     Pathology:   Final Diagnosis   A.  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  - Extensive ductal carcinoma in situ, grade 1 and 2, predominantly cribriform and focal solid, with involvement of sclerosing adenosis and cancerization of lobules, and without extension to margins.  See synoptic report.     B.  Right axillary sentinel lymph node, excision:  - A single benign lymph node, negative for metastatic tumor.     C.  Left breast, simple mastectomy, with suture marked axillary tial:  - Extensive ductal carcinoma in situ involving extensive sclerosing adenosis, without extension to margins.  - No evidence of invasive carcinoma.  See synoptic report.     D.  Left axillary sentinel lymph node, excision:  - A single benign lymph, negative for metastatic tumor.            A/P  Risa Alcaraz is recovering from bilateral mastectomy with immediate reconstruction and bilateral  sentinel lymph node biopsies.  The patient has done very well postoperatively.  She may follow-up in the general surgery clinic on an as-needed basis.  Her pathology was again discussed today in clinic and she was provided with a copy of her pathology report.          Again, thank you for allowing me to participate in the care of your patient.      Sincerely,      Daria Sigala MD

## 2022-07-06 NOTE — LETTER
[facility] [place] [address], [address]  [facility] [address]  Phone: [phone]  Fax: [phone]      July 6, 2022      RE: Risa Alcaraz  [address] [facility] [address]        To whom it may concern:    Risa Alcaraz is under my professional care.  She may return to work on 7/16/2022 without restrictions.      Sincerely,      Daria Sigala MD

## 2022-07-06 NOTE — PROGRESS NOTES
Surgery Postoperative Note    S: Risa is a 51-year-old female who recently underwent bilateral simple mastectomies with bilateral axillary sentinel lymph node biopsies and bilateral tissue expander reconstruction.  A port was also placed at the time of surgery.  The patient has done very well postoperatively.  Her surgical drains have been removed.  She has no significant pain in her surgical sites.  No wound concerns.    Breast: Bilateral breast incisions healing well without erythema or drainage, no evidence of significant underlying hematoma, skin flaps warm and well perfused, port site incisions healing well without evidence of infection    Pathology:   Final Diagnosis   A.  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  - Extensive ductal carcinoma in situ, grade 1 and 2, predominantly cribriform and focal solid, with involvement of sclerosing adenosis and cancerization of lobules, and without extension to margins.  See synoptic report.     B.  Right axillary sentinel lymph node, excision:  - A single benign lymph node, negative for metastatic tumor.     C.  Left breast, simple mastectomy, with suture marked axillary tial:  - Extensive ductal carcinoma in situ involving extensive sclerosing adenosis, without extension to margins.  - No evidence of invasive carcinoma.  See synoptic report.     D.  Left axillary sentinel lymph node, excision:  - A single benign lymph, negative for metastatic tumor.         A/P  Risa Alcaraz is recovering from bilateral mastectomy with immediate reconstruction and bilateral sentinel lymph node biopsies.  The patient has done very well postoperatively.  She may follow-up in the general surgery clinic on an as-needed basis.  Her pathology was again discussed today in clinic and she was provided with a copy of her pathology  report.    Thank you for the opportunity to help in her care.    Daria Sigala MD   [facility]  [phone]    Please route or send letter to:  Primary Care Provider (PCP) and Referring Provider

## 2022-07-08 ENCOUNTER — PATIENT OUTREACH (OUTPATIENT)
Dept: ONCOLOGY | Facility: CLINIC | Age: 52
End: 2022-07-08

## 2022-07-08 NOTE — PROGRESS NOTES
Returned call to Risa to clarify when to take anti nausea and dexamethasone with chemo. Instructed does not need to take prior to chemo. Bring with to first infusion and pharmacist will review with her. Explained Onpro device process as well.     Petrona Gonzalez RN

## 2022-07-08 NOTE — TELEPHONE ENCOUNTER
Patient called and wants letter emailed to her. E-mailed letter to  [email or web address]    Brittany Yap MA

## 2022-07-12 ENCOUNTER — LAB (OUTPATIENT)
Dept: INFUSION THERAPY | Facility: CLINIC | Age: 52
End: 2022-07-12
Attending: NURSE PRACTITIONER
Payer: COMMERCIAL

## 2022-07-12 ENCOUNTER — ONCOLOGY VISIT (OUTPATIENT)
Dept: ONCOLOGY | Facility: CLINIC | Age: 52
End: 2022-07-12
Attending: NURSE PRACTITIONER
Payer: COMMERCIAL

## 2022-07-12 VITALS
BODY MASS INDEX: 37.73 KG/M2 | HEART RATE: 76 BPM | SYSTOLIC BLOOD PRESSURE: 145 MMHG | TEMPERATURE: 99.2 F | WEIGHT: 213 LBS | RESPIRATION RATE: 12 BRPM | DIASTOLIC BLOOD PRESSURE: 74 MMHG | OXYGEN SATURATION: 98 %

## 2022-07-12 DIAGNOSIS — C50.111 MALIGNANT NEOPLASM OF CENTRAL PORTION OF RIGHT BREAST IN FEMALE, ESTROGEN RECEPTOR NEGATIVE (H): Primary | ICD-10-CM

## 2022-07-12 DIAGNOSIS — C50.919 METAPLASTIC CARCINOMA OF BREAST (H): Primary | ICD-10-CM

## 2022-07-12 DIAGNOSIS — Z17.1 MALIGNANT NEOPLASM OF CENTRAL PORTION OF RIGHT BREAST IN FEMALE, ESTROGEN RECEPTOR NEGATIVE (H): Primary | ICD-10-CM

## 2022-07-12 DIAGNOSIS — D05.12 DUCTAL CARCINOMA IN SITU (DCIS) OF LEFT BREAST: ICD-10-CM

## 2022-07-12 LAB
ALBUMIN SERPL-MCNC: 3.6 G/DL (ref 3.4–5)
ALP SERPL-CCNC: 91 U/L (ref 40–150)
ALT SERPL W P-5'-P-CCNC: 19 U/L (ref 0–50)
ANION GAP SERPL CALCULATED.3IONS-SCNC: 6 MMOL/L (ref 3–14)
AST SERPL W P-5'-P-CCNC: 18 U/L (ref 0–45)
BASOPHILS # BLD AUTO: 0.1 10E3/UL (ref 0–0.2)
BASOPHILS NFR BLD AUTO: 1 %
BILIRUB SERPL-MCNC: 0.5 MG/DL (ref 0.2–1.3)
BUN SERPL-MCNC: 18 MG/DL (ref 7–30)
CALCIUM SERPL-MCNC: 9 MG/DL (ref 8.5–10.1)
CHLORIDE BLD-SCNC: 113 MMOL/L (ref 94–109)
CO2 SERPL-SCNC: 23 MMOL/L (ref 20–32)
CREAT SERPL-MCNC: 0.79 MG/DL (ref 0.52–1.04)
EOSINOPHIL # BLD AUTO: 0.3 10E3/UL (ref 0–0.7)
EOSINOPHIL NFR BLD AUTO: 3 %
ERYTHROCYTE [DISTWIDTH] IN BLOOD BY AUTOMATED COUNT: 13.6 % (ref 10–15)
GFR SERPL CREATININE-BSD FRML MDRD: >90 ML/MIN/1.73M2
GLUCOSE BLD-MCNC: 91 MG/DL (ref 70–99)
HCT VFR BLD AUTO: 34.8 % (ref 35–47)
HGB BLD-MCNC: 11.3 G/DL (ref 11.7–15.7)
IMM GRANULOCYTES # BLD: 0 10E3/UL
IMM GRANULOCYTES NFR BLD: 0 %
LYMPHOCYTES # BLD AUTO: 2.3 10E3/UL (ref 0.8–5.3)
LYMPHOCYTES NFR BLD AUTO: 26 %
MCH RBC QN AUTO: 33.5 PG (ref 26.5–33)
MCHC RBC AUTO-ENTMCNC: 32.5 G/DL (ref 31.5–36.5)
MCV RBC AUTO: 103 FL (ref 78–100)
MONOCYTES # BLD AUTO: 0.7 10E3/UL (ref 0–1.3)
MONOCYTES NFR BLD AUTO: 7 %
NEUTROPHILS # BLD AUTO: 5.5 10E3/UL (ref 1.6–8.3)
NEUTROPHILS NFR BLD AUTO: 63 %
NRBC # BLD AUTO: 0 10E3/UL
NRBC BLD AUTO-RTO: 0 /100
PLATELET # BLD AUTO: 214 10E3/UL (ref 150–450)
POTASSIUM BLD-SCNC: 4.2 MMOL/L (ref 3.4–5.3)
PROT SERPL-MCNC: 7.1 G/DL (ref 6.8–8.8)
RBC # BLD AUTO: 3.37 10E6/UL (ref 3.8–5.2)
SODIUM SERPL-SCNC: 142 MMOL/L (ref 133–144)
WBC # BLD AUTO: 8.9 10E3/UL (ref 4–11)

## 2022-07-12 PROCEDURE — 99214 OFFICE O/P EST MOD 30 MIN: CPT | Performed by: NURSE PRACTITIONER

## 2022-07-12 PROCEDURE — 85014 HEMATOCRIT: CPT | Performed by: INTERNAL MEDICINE

## 2022-07-12 PROCEDURE — G0463 HOSPITAL OUTPT CLINIC VISIT: HCPCS

## 2022-07-12 PROCEDURE — 80051 ELECTROLYTE PANEL: CPT | Performed by: INTERNAL MEDICINE

## 2022-07-12 PROCEDURE — 36591 DRAW BLOOD OFF VENOUS DEVICE: CPT

## 2022-07-12 PROCEDURE — 250N000011 HC RX IP 250 OP 636: Performed by: NURSE PRACTITIONER

## 2022-07-12 RX ORDER — HEPARIN SODIUM (PORCINE) LOCK FLUSH IV SOLN 100 UNIT/ML 100 UNIT/ML
5 SOLUTION INTRAVENOUS ONCE
Status: COMPLETED | OUTPATIENT
Start: 2022-07-12 | End: 2022-07-12

## 2022-07-12 RX ADMIN — Medication 5 ML: at 14:36

## 2022-07-12 NOTE — PROGRESS NOTES
"Oncology Rooming Note    July 12, 2022 2:59 PM   Risa Alcaraz is a 51 year old female who presents for:    Chief Complaint   Patient presents with     Oncology Clinic Visit     Malignant neoplasm of central portion of right breast in female, estrogen receptor negative - Labs and provider     Initial Vitals: BP (!) 145/74 (BP Location: Right arm, Patient Position: Sitting, Cuff Size: Adult Large)   Pulse 76   Temp 99.2  F (37.3  C) (Tympanic)   Resp 12   Wt 96.6 kg (213 lb)   SpO2 98%   BMI 37.73 kg/m   Estimated body mass index is 37.73 kg/m  as calculated from the following:    Height as of 6/29/22: 1.6 m (5' 3\").    Weight as of this encounter: 96.6 kg (213 lb). Body surface area is 2.07 meters squared.  Data Unavailable Comment: Data Unavailable   No LMP recorded. (Menstrual status: IUD).  Allergies reviewed: Yes  Medications reviewed: Yes    Medications: Medication refills not needed today.  Pharmacy name entered into [facility]:    [facility] [place], [place] - [address] [facility], [place] - [address] [place] [address]    Clinical concerns:  None      Lidia Connell CMA            "

## 2022-07-12 NOTE — PROGRESS NOTES
Infusion Nursing Note:  Risa Alcaraz presents today for Port Labs.    Patient seen by provider today: Yes: Talia Bond NP   present during visit today: Not Applicable.    Note: Reviewed anti-emetics and dexamethasone with patient.    Intravenous Access:  Implanted Port.    Treatment Conditions:  Not Applicable.    Post Infusion Assessment:  Blood return noted.  Site patent and intact, free from redness, edema or discomfort.  No evidence of extravasations.  Access discontinued per protocol. Patient declined leaving port accessed for tomorrow's infusion.    Discharge Plan:   Discharge instructions reviewed with: Patient.  Patient and/or family verbalized understanding of discharge instructions and all questions answered.  AVS to patient via [email or web address].  Patient will return 7/13/2022 for next appointment.   Patient discharged in stable condition accompanied by: self.  Departure Mode: Ambulatory.    Negra Ayala RN

## 2022-07-12 NOTE — LETTER
7/12/2022         RE: Risa Alcaraz  [address] [facility] [address] [facility] [address]        Dear Colleague,    Thank you for referring your patient, Risa Alcaraz, to the [facility]. Please see a copy of my visit note below.    [facility] Hematology and Oncology Outpatient Progress Note    Patient: Risa Alcaraz  MRN: [id number]  Date of Service: Jul 12, 2022          Reason for Visit    1. 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 Left breast ER/UT+ DCIS    Primary Hematologist/Oncologist: Dr. Mars ([facility])      Assessment/Plan  1.   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 She is recovering well.    Given triple negative, Dr. Mars is recommending 4 cycles of adjuvant Taxotere + Cytoxan. Regimen was reviewed with her and she feel ready to proceed. She has her antiemetics and dexamethasone.   She got information on scalp cooling but has opted against doing this.     Labs adequate. Mild anemia (11.3) may be post-operative in nature.    Plan:  -Proceed with cycle 1 Taxotere + Cytoxan tomorrow. Neulasta OnPro.   -Reviewed use of dexamethasone and antiemetics, she has picked these up  -Dr. Mars recommended cooling hands + feet during Taxotere infusions to reduce risk for peripheral neuropathy, she will discuss with the infusion RNs tomorrow if she desires  -Return in 3 weeks with Dr. Mars ahead of cycle 2 chemo in [place].   -See me in [place] alternating visits (cycle 3)    2.   Left breast ER/UT+ DCIS  Post-bilateral mastectomy. Additional treatment not needed    3.   Pulmonary nodule  Indeterminate on staging CT.     Plan:  -6-month follow-up CT in late Nov/early Dec    4.   Covid prevention  She has not gotten vaccinated and has elected not to. Reviewed higher risks with chemo immunosuppression and she will take alternate precautions.    ______________________________________________________________________________    History of Present Illness/ Interval History    Ms. Risa Alcaraz is a 51 year old with recently diagnosed right triple negative low-grade invasive breast cancer and left breast DCIS. She is s/p bilateral mastectomies 4 weeks ago. Presents today ahead of starting adjuvant chemo, planned tomorrow.     She has recovered very well after mastectomies. She has tissue expanders in for later reconstruction. No signs infection. Eating/drinking well. Active.     ECOG Performance    0      Oncology History/Treatment  Diagnosis/Stage:   5/2022: Stage IA ([id number]-V2-W8) right breast low-grade metaplastic cancer, triple negative AND left breast DCIS (ER/NV+)  -mammogram: distortion central R breast. L breast negative  -diagnostic mammogram: distortion retroareolar right breast. US R breast: 1.8 cm lesion right breast 12:00. No axillary adenopathy. US-biopsy invasive mammary ca with fatures of low-grade metaplastic carcinoma, fibromatosis type, associated DCIS, cribriform subtype, intermediate nuclear grade. ER neg, NV neg, HER2 neg.  -Breast MRI: 4.3 cm mass right breast correlating with biopsy proven cancer. Left breast showed non-mass enhancement 12:00. No adenopathy.   -US left breast: 2 cm hypoechoic area 11:00. Biopsy: grade 1 DCIS, no invasive ca or LVI. ER/%+.  -CTcap: No definite mets. Tiny lung nodules of indeterminate significance.   -Bone scan: no mets   -BRCA gene panel negative  -6/15/22 surgical path:  R breast mammary carcinoma with features of low-grade fibromatosis like metaplastic carcinoma; negative margins. Multifocal with total of 3 foci: 8 mm,  3mm, 2 mm. Extensive DCIS, grade 1 + 2 with involvement of sclerosing adenosis and cancerization of lobules, clear margins. Single R axillary LN negative.   L breast showed extensive DCIS involving extensive sclerosing adenosis, negative margins; no invasive carcinoma. Single L  axillary LN negative.      Treatment:  6/15/2022: bilateral skin sparing mastectomies with bilateral axillary sLN excision and reconstruction with tissue expanders    7/13/22: adjuvant Taxotere + Cytoxan x 4 cycles planned    Physical Exam    GENERAL: Alert and oriented to time place and person. Seated comfortably. In no distress.  HEAD: Atraumatic and normocephalic. No alopecia.  EYES: DENYS, EOMI. No erythema. No icterus.  LYMPH NODES: No palpable supraclavicular, cervical, axillary lymphadenopathy.  BREASTS: Bilateral mastectomy incisions well-healed. No signs infection.   CHEST: clear to auscultation bilaterally. Resonant to percussion throughout bilaterally. Symmetrical breath movements bilaterally.  CVS: S1 and S2 are heard. Regular rate and rhythm. No murmur or gallop or rub heard.  ABDOMEN: Soft. Not tender. Not distended. No palpable hepatomegaly or splenomegaly. No other mass palpable. Bowel sounds present.  EXTREMITIES: Warm. No peripheral edema.  SKIN: no rash, or bruising or purpura.   NEURO: No gross deficit noted. Non-antalgic gait.      Lab Results    Recent Results (from the past 168 hour(s))   Comprehensive metabolic panel   Result Value Ref Range    Sodium 142 133 - 144 mmol/L    Potassium 4.2 3.4 - 5.3 mmol/L    Chloride 113 (H) 94 - 109 mmol/L    Carbon Dioxide (CO2) 23 20 - 32 mmol/L    Anion Gap 6 3 - 14 mmol/L    Urea Nitrogen 18 7 - 30 mg/dL    Creatinine 0.79 0.52 - 1.04 mg/dL    Calcium 9.0 8.5 - 10.1 mg/dL    Glucose 91 70 - 99 mg/dL    Alkaline Phosphatase 91 40 - 150 U/L    AST 18 0 - 45 U/L    ALT 19 0 - 50 U/L    Protein Total 7.1 6.8 - 8.8 g/dL    Albumin 3.6 3.4 - 5.0 g/dL    Bilirubin Total 0.5 0.2 - 1.3 mg/dL    GFR Estimate >90 >60 mL/min/1.73m2   CBC with platelets and differential   Result Value Ref Range    WBC Count 8.9 4.0 - 11.0 10e3/uL    RBC Count 3.37 (L) 3.80 - 5.20 10e6/uL    Hemoglobin 11.3 (L) 11.7 - 15.7 g/dL    Hematocrit 34.8 (L) 35.0 - 47.0 %     (H) 78 -  100 fL    MCH 33.5 (H) 26.5 - 33.0 pg    MCHC 32.5 31.5 - 36.5 g/dL    RDW 13.6 10.0 - 15.0 %    Platelet Count 214 150 - 450 10e3/uL    % Neutrophils 63 %    % Lymphocytes 26 %    % Monocytes 7 %    % Eosinophils 3 %    % Basophils 1 %    % Immature Granulocytes 0 %    NRBCs per 100 WBC 0 <1 /100    Absolute Neutrophils 5.5 1.6 - 8.3 10e3/uL    Absolute Lymphocytes 2.3 0.8 - 5.3 10e3/uL    Absolute Monocytes 0.7 0.0 - 1.3 10e3/uL    Absolute Eosinophils 0.3 0.0 - 0.7 10e3/uL    Absolute Basophils 0.1 0.0 - 0.2 10e3/uL    Absolute Immature Granulocytes 0.0 <=0.4 10e3/uL    Absolute NRBCs 0.0 10e3/uL       Imaging    NM Lymphoscintigraphy Injection only    Result Date: 6/15/2022  Examination:  NM LYMPHOSCINTIGRAPHY INJECTION ONLY Date: 6/15/2022 11:47 AM Indication:  bilateral breast cancer; Metaplastic carcinoma of breast (H). Technique: 579uCi Tc99m Lymphoseek-10:00 position, Right breast @ 1135.  564uCi Tc99m Lymphoseek-2:00 position, Left breast @ 1140. Images were not obtained as part of the localization procedure. PRECIOUS HUSTON MD   SYSTEM ID:  [id number]    XR Surgery ANGELITO L/T 5 Min Fluoro w Stills    Result Date: 6/16/2022  SURGERY C-ARM FLUORO LESS THAN 5 MINUTES WITH STILLS  6/15/2022 1:55 PM HISTORY: Port placement. COMPARISON: None.     IMPRESSION: Two spot fluoroscopic images obtained with catheter entering the left internal jugular vein with tip in the distal SVC. Linear densities overlie the lower hemithorax, presumably on the external surface of patient, though correlation is necessary. Total fluoroscopic time was 0.4 minutes. AUGUSTINE HERRERA MD   SYSTEM ID:  [id number]      Billing  Total time 35 minutes, to include face to face visit, review of EMR, ordering, documentation and coordination of care on date of service    Signed by: Talia Bond NP        Oncology Rooming Note    July 12, 2022 2:59 PM   Risa Alcaraz is a 51 year old female who presents for:    Chief Complaint   Patient presents with      "Oncology Clinic Visit     Malignant neoplasm of central portion of right breast in female, estrogen receptor negative - Labs and provider     Initial Vitals: BP (!) 145/74 (BP Location: Right arm, Patient Position: Sitting, Cuff Size: Adult Large)   Pulse 76   Temp 99.2  F (37.3  C) (Tympanic)   Resp 12   Wt 96.6 kg (213 lb)   SpO2 98%   BMI 37.73 kg/m   Estimated body mass index is 37.73 kg/m  as calculated from the following:    Height as of 6/29/22: 1.6 m (5' 3\").    Weight as of this encounter: 96.6 kg (213 lb). Body surface area is 2.07 meters squared.  Data Unavailable Comment: Data Unavailable   No LMP recorded. (Menstrual status: IUD).  Allergies reviewed: Yes  Medications reviewed: Yes    Medications: Medication refills not needed today.  Pharmacy name entered into [employer]:    [facility], [place] - [address] [place] - [address] [place] [address]    Clinical concerns:  None      Lidia Connell CMA                Again, thank you for allowing me to participate in the care of your patient.        Sincerely,        Talia Bond NP    "

## 2022-07-12 NOTE — PATIENT INSTRUCTIONS
Proceed with cycle 1 chemo tomorrow + Neulasta onPro  -pt may want to do cold packs on hands/feet with Taxotere infusion, check with her on that at chemo    Will see Dr Mars and chemo in [place] cycles 2 + 4  Will see Talia and chemo in [place] cycle 3  (Already scheduled, keep as is. Can remove day 2 neulasta appts if getting Neulasta onPro)

## 2022-07-12 NOTE — PROGRESS NOTES
[facility] Hematology and Oncology Outpatient Progress Note    Patient: Risa Alcaraz  MRN: [id number]  Date of Service: Jul 12, 2022          Reason for Visit    1. 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 Left breast ER/NC+ DCIS    Primary Hematologist/Oncologist: Dr. Mars ([facility])      Assessment/Plan  1.   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 She is recovering well.    Given triple negative, Dr. Mars is recommending 4 cycles of adjuvant Taxotere + Cytoxan. Regimen was reviewed with her and she feel ready to proceed. She has her antiemetics and dexamethasone.   She got information on scalp cooling but has opted against doing this.     Labs adequate. Mild anemia (11.3) may be post-operative in nature.    Plan:  -Proceed with cycle 1 Taxotere + Cytoxan tomorrow. Neulasta OnPro.   -Reviewed use of dexamethasone and antiemetics, she has picked these up  -Dr. Mars recommended cooling hands + feet during Taxotere infusions to reduce risk for peripheral neuropathy, she will discuss with the infusion RNs tomorrow if she desires  -Return in 3 weeks with Dr. Mars ahead of cycle 2 chemo in [place].   -See me in [place] alternating visits (cycle 3)    2.   Left breast ER/NC+ DCIS  Post-bilateral mastectomy. Additional treatment not needed    3.   Pulmonary nodule  Indeterminate on staging CT.     Plan:  -6-month follow-up CT in late Nov/early Dec    4.   Covid prevention  She has not gotten vaccinated and has elected not to. Reviewed higher risks with chemo immunosuppression and she will take alternate precautions.   ______________________________________________________________________________    History of Present Illness/ Interval History    Ms. Risa Alcaraz is a 51 year old with recently diagnosed right triple negative low-grade invasive breast cancer and left breast  DCIS. She is s/p bilateral mastectomies 4 weeks ago. Presents today ahead of starting adjuvant chemo, planned tomorrow.     She has recovered very well after mastectomies. She has tissue expanders in for later reconstruction. No signs infection. Eating/drinking well. Active.     ECOG Performance    0      Oncology History/Treatment  Diagnosis/Stage:   5/2022: Stage IA ([id number]-G0-Z5) right breast low-grade metaplastic cancer, triple negative AND left breast DCIS (ER/MN+)  -mammogram: distortion central R breast. L breast negative  -diagnostic mammogram: distortion retroareolar right breast. US R breast: 1.8 cm lesion right breast 12:00. No axillary adenopathy. US-biopsy invasive mammary ca with fatures of low-grade metaplastic carcinoma, fibromatosis type, associated DCIS, cribriform subtype, intermediate nuclear grade. ER neg, MN neg, HER2 neg.  -Breast MRI: 4.3 cm mass right breast correlating with biopsy proven cancer. Left breast showed non-mass enhancement 12:00. No adenopathy.   -US left breast: 2 cm hypoechoic area 11:00. Biopsy: grade 1 DCIS, no invasive ca or LVI. ER/%+.  -CTcap: No definite mets. Tiny lung nodules of indeterminate significance.   -Bone scan: no mets   -BRCA gene panel negative  -6/15/22 surgical path:  R breast mammary carcinoma with features of low-grade fibromatosis like metaplastic carcinoma; negative margins. Multifocal with total of 3 foci: 8 mm,  3mm, 2 mm. Extensive DCIS, grade 1 + 2 with involvement of sclerosing adenosis and cancerization of lobules, clear margins. Single R axillary LN negative.   L breast showed extensive DCIS involving extensive sclerosing adenosis, negative margins; no invasive carcinoma. Single L axillary LN negative.      Treatment:  6/15/2022: bilateral skin sparing mastectomies with bilateral axillary sLN excision and reconstruction with tissue expanders    7/13/22: adjuvant Taxotere + Cytoxan x 4 cycles planned    Physical Exam    GENERAL: Alert and  oriented to time place and person. Seated comfortably. In no distress.  HEAD: Atraumatic and normocephalic. No alopecia.  EYES: DENYS, EOMI. No erythema. No icterus.  LYMPH NODES: No palpable supraclavicular, cervical, axillary lymphadenopathy.  BREASTS: Bilateral mastectomy incisions well-healed. No signs infection.   CHEST: clear to auscultation bilaterally. Resonant to percussion throughout bilaterally. Symmetrical breath movements bilaterally.  CVS: S1 and S2 are heard. Regular rate and rhythm. No murmur or gallop or rub heard.  ABDOMEN: Soft. Not tender. Not distended. No palpable hepatomegaly or splenomegaly. No other mass palpable. Bowel sounds present.  EXTREMITIES: Warm. No peripheral edema.  SKIN: no rash, or bruising or purpura.   NEURO: No gross deficit noted. Non-antalgic gait.      Lab Results    Recent Results (from the past 168 hour(s))   Comprehensive metabolic panel   Result Value Ref Range    Sodium 142 133 - 144 mmol/L    Potassium 4.2 3.4 - 5.3 mmol/L    Chloride 113 (H) 94 - 109 mmol/L    Carbon Dioxide (CO2) 23 20 - 32 mmol/L    Anion Gap 6 3 - 14 mmol/L    Urea Nitrogen 18 7 - 30 mg/dL    Creatinine 0.79 0.52 - 1.04 mg/dL    Calcium 9.0 8.5 - 10.1 mg/dL    Glucose 91 70 - 99 mg/dL    Alkaline Phosphatase 91 40 - 150 U/L    AST 18 0 - 45 U/L    ALT 19 0 - 50 U/L    Protein Total 7.1 6.8 - 8.8 g/dL    Albumin 3.6 3.4 - 5.0 g/dL    Bilirubin Total 0.5 0.2 - 1.3 mg/dL    GFR Estimate >90 >60 mL/min/1.73m2   CBC with platelets and differential   Result Value Ref Range    WBC Count 8.9 4.0 - 11.0 10e3/uL    RBC Count 3.37 (L) 3.80 - 5.20 10e6/uL    Hemoglobin 11.3 (L) 11.7 - 15.7 g/dL    Hematocrit 34.8 (L) 35.0 - 47.0 %     (H) 78 - 100 fL    MCH 33.5 (H) 26.5 - 33.0 pg    MCHC 32.5 31.5 - 36.5 g/dL    RDW 13.6 10.0 - 15.0 %    Platelet Count 214 150 - 450 10e3/uL    % Neutrophils 63 %    % Lymphocytes 26 %    % Monocytes 7 %    % Eosinophils 3 %    % Basophils 1 %    % Immature  Granulocytes 0 %    NRBCs per 100 WBC 0 <1 /100    Absolute Neutrophils 5.5 1.6 - 8.3 10e3/uL    Absolute Lymphocytes 2.3 0.8 - 5.3 10e3/uL    Absolute Monocytes 0.7 0.0 - 1.3 10e3/uL    Absolute Eosinophils 0.3 0.0 - 0.7 10e3/uL    Absolute Basophils 0.1 0.0 - 0.2 10e3/uL    Absolute Immature Granulocytes 0.0 <=0.4 10e3/uL    Absolute NRBCs 0.0 10e3/uL       Imaging    NM Lymphoscintigraphy Injection only    Result Date: 6/15/2022  Examination:  NM LYMPHOSCINTIGRAPHY INJECTION ONLY Date: 6/15/2022 11:47 AM Indication:  bilateral breast cancer; Metaplastic carcinoma of breast (H). Technique: 579uCi Tc99m Lymphoseek-10:00 position, Right breast @ 1135.  564uCi Tc99m Lymphoseek-2:00 position, Left breast @ 1140. Images were not obtained as part of the localization procedure. PRECIOUS HUSOTN MD   SYSTEM ID:  [id number]    XR Surgery ANGELITO L/T 5 Min Fluoro w Stills    Result Date: 6/16/2022  SURGERY C-ARM FLUORO LESS THAN 5 MINUTES WITH STILLS  6/15/2022 1:55 PM HISTORY: Port placement. COMPARISON: None.     IMPRESSION: Two spot fluoroscopic images obtained with catheter entering the left internal jugular vein with tip in the distal SVC. Linear densities overlie the lower hemithorax, presumably on the external surface of patient, though correlation is necessary. Total fluoroscopic time was 0.4 minutes. AUGUSTINE HERRERA MD   SYSTEM ID:  [id number]      Billing  Total time 35 minutes, to include face to face visit, review of EMR, ordering, documentation and coordination of care on date of service    Signed by: Talia Bond NP

## 2022-07-13 ENCOUNTER — INFUSION THERAPY VISIT (OUTPATIENT)
Dept: INFUSION THERAPY | Facility: CLINIC | Age: 52
End: 2022-07-13
Attending: NURSE PRACTITIONER
Payer: COMMERCIAL

## 2022-07-13 VITALS
HEART RATE: 67 BPM | RESPIRATION RATE: 16 BRPM | SYSTOLIC BLOOD PRESSURE: 144 MMHG | TEMPERATURE: 98 F | DIASTOLIC BLOOD PRESSURE: 67 MMHG

## 2022-07-13 DIAGNOSIS — Z17.1 MALIGNANT NEOPLASM OF CENTRAL PORTION OF RIGHT BREAST IN FEMALE, ESTROGEN RECEPTOR NEGATIVE (H): Primary | ICD-10-CM

## 2022-07-13 DIAGNOSIS — C50.111 MALIGNANT NEOPLASM OF CENTRAL PORTION OF RIGHT BREAST IN FEMALE, ESTROGEN RECEPTOR NEGATIVE (H): Primary | ICD-10-CM

## 2022-07-13 PROCEDURE — 96417 CHEMO IV INFUS EACH ADDL SEQ: CPT

## 2022-07-13 PROCEDURE — 258N000003 HC RX IP 258 OP 636: Performed by: INTERNAL MEDICINE

## 2022-07-13 PROCEDURE — 96367 TX/PROPH/DG ADDL SEQ IV INF: CPT

## 2022-07-13 PROCEDURE — 96372 THER/PROPH/DIAG INJ SC/IM: CPT | Performed by: INTERNAL MEDICINE

## 2022-07-13 PROCEDURE — 96413 CHEMO IV INFUSION 1 HR: CPT

## 2022-07-13 PROCEDURE — 96375 TX/PRO/DX INJ NEW DRUG ADDON: CPT

## 2022-07-13 PROCEDURE — 96377 APPLICATON ON-BODY INJECTOR: CPT | Mod: XS

## 2022-07-13 PROCEDURE — 250N000011 HC RX IP 250 OP 636: Performed by: INTERNAL MEDICINE

## 2022-07-13 RX ORDER — HEPARIN SODIUM (PORCINE) LOCK FLUSH IV SOLN 100 UNIT/ML 100 UNIT/ML
5 SOLUTION INTRAVENOUS
Status: DISCONTINUED | OUTPATIENT
Start: 2022-07-13 | End: 2022-07-13 | Stop reason: HOSPADM

## 2022-07-13 RX ORDER — ONDANSETRON 2 MG/ML
8 INJECTION INTRAMUSCULAR; INTRAVENOUS ONCE
Status: COMPLETED | OUTPATIENT
Start: 2022-07-13 | End: 2022-07-13

## 2022-07-13 RX ADMIN — DOCETAXEL 160 MG: 20 INJECTION, SOLUTION, CONCENTRATE INTRAVENOUS at 10:09

## 2022-07-13 RX ADMIN — DEXAMETHASONE SODIUM PHOSPHATE: 10 INJECTION, SOLUTION INTRAMUSCULAR; INTRAVENOUS at 09:38

## 2022-07-13 RX ADMIN — ONDANSETRON 8 MG: 2 INJECTION INTRAMUSCULAR; INTRAVENOUS at 09:26

## 2022-07-13 RX ADMIN — PEGFILGRASTIM 6 MG: KIT SUBCUTANEOUS at 12:15

## 2022-07-13 RX ADMIN — CYCLOPHOSPHAMIDE 1235 MG: 1 INJECTION, POWDER, FOR SOLUTION INTRAVENOUS; ORAL at 11:22

## 2022-07-13 RX ADMIN — Medication 5 ML: at 12:14

## 2022-07-13 RX ADMIN — SODIUM CHLORIDE 250 ML: 9 INJECTION, SOLUTION INTRAVENOUS at 09:24

## 2022-07-13 RX ADMIN — FAMOTIDINE 20 MG: 10 INJECTION INTRAVENOUS at 09:30

## 2022-07-13 NOTE — PROGRESS NOTES
Infusion Nursing Note:  Risa Alcaraz presents today for C1D1 Taxotere/Cytoxan.    Patient seen by provider today: No   present during visit today: Not Applicable.    Note: Pt reports chemo education was done. Additional literature provided today as well as Neulasta on-pro training. Pt would like cooling therapy on hands and feet during Taxotere infusion.    Intravenous Access:  Implanted Port.    Treatment Conditions:  Lab Results   Component Value Date    HGB 11.3 (L) 07/12/2022    WBC 8.9 07/12/2022    ANEUTAUTO 5.5 07/12/2022     07/12/2022      Lab Results   Component Value Date     07/12/2022    POTASSIUM 4.2 07/12/2022    CR 0.79 07/12/2022    JAMIL 9.0 07/12/2022    BILITOTAL 0.5 07/12/2022    ALBUMIN 3.6 07/12/2022    ALT 19 07/12/2022    AST 18 07/12/2022     Results reviewed, labs MET treatment parameters, ok to proceed with treatment.    Post Infusion Assessment:  Patient tolerated infusion without incident.  Blood return noted pre and post infusion.  Site patent and intact, free from redness, edema or discomfort.  No evidence of extravasations.  Access discontinued per protocol.     Your On-body Neulasta injector was applied today at 1230.  The injection will start 27 hours after application, at 3:30 pm tomorrow.  Note: the medication will be delivered over 45 minutes.  Before removing the injector, check to see that the light is either solid green or off and that the indicator line is on empty.  If there is a red light on the injector at any time or wetness on the dressing or under the injector after removal, you must report this information.   Call [facility]: [phone] during business hours.  Please refer to the written information given to you for additional information on the injector.     Discharge Plan:   Discharge instructions reviewed with: Patient.  Patient and/or family verbalized understanding of discharge instructions and all questions answered.  Patient discharged in  stable condition accompanied by: self.  Departure Mode: Ambulatory.      Jayshree Champagne RN

## 2022-07-19 NOTE — PROGRESS NOTES
Risa is a 51 year old who is being evaluated via a billable video visit.  Currently in [place].    How would you like to obtain your AVS? MyChart  If the video visit is dropped, the invitation should be resent by:  Email:[email or web address]  Will anyone else be joining your video visit? No       RAMIRO Rowe      Video-Visit Details    Video Start Time: 8:58 AM    Type of service:  Video Visit    Video End Time:9:05 AM    Originating Location (pt. Location): Other Clinic    Distant Location (provider location):  [facility]     Platform used for Video Visit: [facility]    Hematology/Oncology Established Patient Note      Today's Date: 8/3/22    Reason for follow-up: Right breast cancer.    HISTORY OF PRESENT ILLNESS: Risa Alcaraz is a 51 year old female who presents with the following oncologic history:  1.  4/22/2022: Mammogram showed distortion in the central right breast.  Left breast is negative.  2.  5/2/2022: Diagnostic right mammogram showed distortion in the retroareolar breast.  Targeted ultrasound of the right breast at 12:00, 2 cm from the nipple measured 1.8 x 1.3 cm.  Survey of axilla showed no evidence of adenopathy.  3.  5/3/2022: Ultrasound-guided needle biopsy of the right breast at 12:00 showed invasive mammary carcinoma with features of low-grade metaplastic carcinoma, fibromatosis type, associated DCIS, cribriform subtype, intermediate nuclear grade, ER negative, MT negative, HER2/radha FISH negative.  4. 5/24/2022: Breast MRI showed nonmass enhancement at the 12:00 position of the RIGHT breast corresponds to biopsy-proven malignancy and measures 4.3 x 2.3 x 3.2 cm. Nodular nonmass enhancement at the 12:00 position of the LEFT breast. No evidence of adenopathy.  5. 5/25/2022: CT chest/abdomen/pelvis showed 2 mm pulmonary nodule in right upper lobe posteriorly; mildly prominent prevascular lymph node between the right  brachiocephalic and left common carotid arteries measures 8 mm, upper limits of normal; no definite evidence of metastatic disease. NM bone scan showed no bone metastases.  6. 6/1/2022: Left breast U/S showed hypoechoic area measuring 2 cm at 11:00, 5 cm from nipple corresponding to MRI finding. Biopsy of left breast lesion showed nuclear grade 1 DCIS; no evidence of invasive carcinoma or LVI, ER and VA both 100% positive. After breast tumor board discussion, consensus was for patient to proceed with surgery upfront followed by adjuvant chemotherapy.  7. 6/9/2022: BRCA gene panel negative.  8. 6/15/2022: Underwent bilateral skin sparing mastectomies with bilateral axillary sentinel lymph node excision, port placement under care of Dr. Daria Sigala and reconstruction with tissue expanders with Dr. Charles Ladd.  Pathology showed 8 x 5 mm right breast invasive mammary carcinoma with features of low-grade fibromatosis like metaplastic carcinoma and without extension to margins.  Right breast tumor was multifocal with total of 3 foci: 8 mm, 3 mm, 2 mm.  Extensive DCIS, grade 1 and 2 with involvement of sclerosing adenosis and cancerization of lobules and without extension of margins.  A single right axillary lymph node is negative for metastatic disease.  Left breast showed extensive DCIS involving extensive sclerosing adenosis without extension to margins.  No evidence of invasive carcinoma in the left breast.  A single left axillary lymph node is negative for metastatic disease.  9. 7/13/2022: Started adjuvant chemotherapy with Taxotere and Cytoxan.    INTERVAL HISTORY:  Risa reports a few days of feeling unwell post-chemo. She reports some tongue tingling and fingertip paresthesias that have subsided after 1 week post-chemo.  She had metallic taste that has now recovered. She had bone pain after cycle 1.    REVIEW OF SYSTEMS:   14 point ROS was reviewed and is negative other than as noted above in HPI.       HOME  MEDICATIONS:  Current Outpatient Medications   Medication Sig Dispense Refill     acetaminophen (TYLENOL) 325 MG tablet Take 325-650 mg by mouth every 6 hours as needed for mild pain       ibuprofen (ADVIL/MOTRIN) 600 MG tablet Take 600 mg by mouth every 6 hours as needed for moderate pain       multivitamin w/minerals (THERA-VIT-M) tablet Take 1 tablet by mouth daily       paragard intrauterine copper device 1 each by Intrauterine route once       vitamin C (ASCORBIC ACID) 1000 MG TABS Take 1,000 mg by mouth daily       dexamethasone (DECADRON) 4 MG tablet Take 2 tablets (8 mg) by mouth 2 times daily (with meals) for 3 doses Start evening of Docetaxel infusion and continue for a total of 3 doses. (Patient not taking: Reported on 7/12/2022) 6 tablet 3     ondansetron (ZOFRAN ODT) 4 MG ODT tab Take 1 tablet (4 mg) by mouth every 6 hours as needed for nausea (Patient not taking: No sig reported) 10 tablet 0     ondansetron (ZOFRAN) 8 MG tablet Take 1 tablet (8 mg) by mouth every 8 hours as needed for nausea (vomiting) (Patient not taking: No sig reported) 30 tablet 2     prochlorperazine (COMPAZINE) 10 MG tablet Take 1 tablet (10 mg) by mouth every 6 hours as needed for nausea or vomiting (Patient not taking: No sig reported) 30 tablet 2         ALLERGIES:  No Known Allergies      PAST MEDICAL HISTORY:  Past Medical History:   Diagnosis Date     Obese          PAST SURGICAL HISTORY:  Past Surgical History:   Procedure Laterality Date     BIOPSY NODE SENTINEL Bilateral 6/15/2022    Procedure: BILATERAL AXILLARY SENTINEL LYMPH NODE BIOPSIES;  Surgeon: Daria Sigala MD;  Location:  OR     INSERT PORT VASCULAR ACCESS N/A 6/15/2022    Procedure: PORT PLACEMENT;  Surgeon: Daria Sigala MD;  Location:  OR     MASTECTOMY SIMPLE BILATERAL Bilateral 6/15/2022    Procedure: BILATERAL SKIN SPARING MASTECTOMIES;  Surgeon: Daria Sigala MD;  Location:  OR     RECONSTRUCT BREAST, INSERT TISSUE EXPANDER BILATERAL,  COMBINED Bilateral 6/15/2022    Procedure: BILATERAL BREAST RECONSTRUCTION WITH TISSUE EXPANDERS;  Surgeon: Charles Ladd MD;  Location:  OR     [facility] ORAL SURGERY PROCEDURE      wisdom teeth, hypotension with anesthesia         SOCIAL HISTORY:  Social History     Socioeconomic History     Marital status:      Spouse name: Not on file     Number of children: Not on file     Years of education: Not on file     Highest education level: Not on file   Occupational History     Employer: [employer]   Tobacco Use     Smoking status: Former Smoker     Types: Cigarettes, Cigars     Quit date:      Years since quittin.5     Smokeless tobacco: Never Used     Tobacco comment: Quit    Vaping Use     Vaping Use: Never used   Substance and Sexual Activity     Alcohol use: Yes     Comment: occ     Drug use: No     Sexual activity: Yes     Partners: Male     Birth control/protection: I.U.D.     Comment: mirena    Other Topics Concern     Parent/sibling w/ CABG, MI or angioplasty before 65F 55M? Not Asked   Social History Narrative     Not on file     Social Determinants of Health     Financial Resource Strain: Not on file   Food Insecurity: Not on file   Transportation Needs: Not on file   Physical Activity: Not on file   Stress: Not on file   Social Connections: Not on file   Intimate Partner Violence: Not on file   Housing Stability: Not on file         FAMILY HISTORY:  Family History   Problem Relation Age of Onset     Diabetes Mother      Hypertension Mother      Cancer Father         penial     Hypertension Father      Cancer Maternal Grandmother         liver     Heart Disease Maternal Grandfather         MI     Cancer Paternal Grandmother         brain     Heart Disease Paternal Grandfather         MI         PHYSICAL EXAM:  Vital signs:  There were no vitals taken for this visit.   GENERAL: No acute distress.  EYES: No scleral icterus. No overt erythema.  RESPIRATORY: No audible cough,  wheezing, or labored breathing.  MUSCULOSKELETAL: Range of motion in the neck, shoulders, and arms appear normal.  SKIN: No overt rashes, discolorations, or lesions over the face and neck.  NEUROLOGIC: Alert.  No overt tremors.  PSYCHIATRIC: Normal affect and mood.  Does not appear anxious.    LABS:  CBC RESULTS: Recent Labs   Lab Test 08/03/22  0802   WBC 6.4   RBC 3.40*   HGB 11.6*   HCT 35.0   *   MCH 34.1*   MCHC 33.1   RDW 13.7        Last Comprehensive Metabolic Panel:  Sodium   Date Value Ref Range Status   07/12/2022 142 133 - 144 mmol/L Final     Potassium   Date Value Ref Range Status   07/12/2022 4.2 3.4 - 5.3 mmol/L Final     Chloride   Date Value Ref Range Status   07/12/2022 113 (H) 94 - 109 mmol/L Final     Carbon Dioxide (CO2)   Date Value Ref Range Status   07/12/2022 23 20 - 32 mmol/L Final     Anion Gap   Date Value Ref Range Status   07/12/2022 6 3 - 14 mmol/L Final     Glucose   Date Value Ref Range Status   07/12/2022 91 70 - 99 mg/dL Final   01/30/2015 95 70 - 99 mg/dL Final     Comment:     Effective 7/30/2014, the reference range for this assay has changed to reflect   new instrumentation/methodology.       Urea Nitrogen   Date Value Ref Range Status   07/12/2022 18 7 - 30 mg/dL Final     Creatinine   Date Value Ref Range Status   07/12/2022 0.79 0.52 - 1.04 mg/dL Final     GFR Estimate   Date Value Ref Range Status   07/12/2022 >90 >60 mL/min/1.73m2 Final     Comment:     Effective December 21, 2021 eGFRcr in adults is calculated using the 2021 CKD-EPI creatinine equation which includes age and gender (Luh et al., NEJM, DOI: 10.1056/XHSDia5094708)     Calcium   Date Value Ref Range Status   07/12/2022 9.0 8.5 - 10.1 mg/dL Final     Bilirubin Total   Date Value Ref Range Status   07/12/2022 0.5 0.2 - 1.3 mg/dL Final     Alkaline Phosphatase   Date Value Ref Range Status   07/12/2022 91 40 - 150 U/L Final     ALT   Date Value Ref Range Status   07/12/2022 19 0 - 50 U/L Final      AST   Date Value Ref Range Status   07/12/2022 18 0 - 45 U/L Final         PATHOLOGY:  None new since prior visit.    IMAGING:  None new since prior visit.    ASSESSMENT/PLAN:  Risa Alcaraz is a 51 year old female with the following issues:  1.  Pathologic prognostic stage IA, [id number], low-grade metaplastic carcinoma, fibromatosis type, of right central breast ER negative, RI negative, HER2/radha FISH negative (triple negative)  2. Left breast DCIS  - Risa is status post bilateral mastectomies with negative surgical margins and no lymph node involvement.   -- She commenced adjuvant chemotherapy with Taxotere and Cytoxan on 7/13/2022 for planned every 3 weeks for a total of 4 cycles to reduce her risk of breast cancer recurrence.  -Discussed with Risa that her labs showed hemoglobin 11.6, WBCs 6.4, platelets 320,000.  Her blood counts have adequately recovered to proceed with cycle 2 Taxotere and Cytoxan today.  -She wishes to continue her chemotherapy closer to home in [place].  She can see a nurse practitioner in [place] with cycle 3 and a video visit with me for cycle 4.  - I did recommend continuing to cool her hands and feet during the Taxotere infusions to reduce the risk of peripheral neuropathy.  - Discussed that her overall prognosis is still excellent given the lower tendency for the fibromatosis low-grade type of metaplastic cancer to metastasize distantly or to the lymph nodes.  - Discussed that surveillance following completion of chemotherapy would largely be based on history and physical exams with imaging and lab work-up reserved for any concerning signs or symptoms that could arise.    3. Indeterminate pulmonary nodule  --Will reevaluate with 6-month interval chest CT at the end of 11/2022 or early 12/2020.    4.  Diffuse bone pain  - Related to Neulasta.  - Advised use of Claritin to prevent bone pain.    Briseyda Mars MD  Hematology/Oncology  [facility]    Total time  spent: 30 minutes in patient evaluation, counseling, documentation, and coordination of care.

## 2022-08-03 ENCOUNTER — INFUSION THERAPY VISIT (OUTPATIENT)
Dept: INFUSION THERAPY | Facility: CLINIC | Age: 52
End: 2022-08-03
Attending: INTERNAL MEDICINE
Payer: COMMERCIAL

## 2022-08-03 ENCOUNTER — VIRTUAL VISIT (OUTPATIENT)
Dept: ONCOLOGY | Facility: CLINIC | Age: 52
End: 2022-08-03
Attending: INTERNAL MEDICINE
Payer: COMMERCIAL

## 2022-08-03 VITALS — SYSTOLIC BLOOD PRESSURE: 151 MMHG | HEART RATE: 73 BPM | RESPIRATION RATE: 16 BRPM | DIASTOLIC BLOOD PRESSURE: 85 MMHG

## 2022-08-03 DIAGNOSIS — C50.111 MALIGNANT NEOPLASM OF CENTRAL PORTION OF RIGHT BREAST IN FEMALE, ESTROGEN RECEPTOR NEGATIVE (H): Primary | ICD-10-CM

## 2022-08-03 DIAGNOSIS — Z17.1 MALIGNANT NEOPLASM OF CENTRAL PORTION OF RIGHT BREAST IN FEMALE, ESTROGEN RECEPTOR NEGATIVE (H): Primary | ICD-10-CM

## 2022-08-03 DIAGNOSIS — M89.8X9 BONE PAIN DUE TO G-CSF: ICD-10-CM

## 2022-08-03 DIAGNOSIS — R91.8 PULMONARY NODULES: ICD-10-CM

## 2022-08-03 LAB
ALBUMIN SERPL-MCNC: 3.6 G/DL (ref 3.4–5)
ALP SERPL-CCNC: 120 U/L (ref 40–150)
ALT SERPL W P-5'-P-CCNC: 29 U/L (ref 0–50)
ANION GAP SERPL CALCULATED.3IONS-SCNC: 7 MMOL/L (ref 3–14)
AST SERPL W P-5'-P-CCNC: 20 U/L (ref 0–45)
BASOPHILS # BLD AUTO: 0.1 10E3/UL (ref 0–0.2)
BASOPHILS NFR BLD AUTO: 1 %
BILIRUB SERPL-MCNC: 0.5 MG/DL (ref 0.2–1.3)
BUN SERPL-MCNC: 14 MG/DL (ref 7–30)
CALCIUM SERPL-MCNC: 8.8 MG/DL (ref 8.5–10.1)
CHLORIDE BLD-SCNC: 111 MMOL/L (ref 94–109)
CO2 SERPL-SCNC: 25 MMOL/L (ref 20–32)
CREAT SERPL-MCNC: 0.77 MG/DL (ref 0.52–1.04)
EOSINOPHIL # BLD AUTO: 0.1 10E3/UL (ref 0–0.7)
EOSINOPHIL NFR BLD AUTO: 1 %
ERYTHROCYTE [DISTWIDTH] IN BLOOD BY AUTOMATED COUNT: 13.7 % (ref 10–15)
GFR SERPL CREATININE-BSD FRML MDRD: >90 ML/MIN/1.73M2
GLUCOSE BLD-MCNC: 105 MG/DL (ref 70–99)
HCT VFR BLD AUTO: 35 % (ref 35–47)
HGB BLD-MCNC: 11.6 G/DL (ref 11.7–15.7)
IMM GRANULOCYTES # BLD: 0 10E3/UL
IMM GRANULOCYTES NFR BLD: 0 %
LYMPHOCYTES # BLD AUTO: 1.3 10E3/UL (ref 0.8–5.3)
LYMPHOCYTES NFR BLD AUTO: 21 %
MCH RBC QN AUTO: 34.1 PG (ref 26.5–33)
MCHC RBC AUTO-ENTMCNC: 33.1 G/DL (ref 31.5–36.5)
MCV RBC AUTO: 103 FL (ref 78–100)
MONOCYTES # BLD AUTO: 0.6 10E3/UL (ref 0–1.3)
MONOCYTES NFR BLD AUTO: 9 %
NEUTROPHILS # BLD AUTO: 4.4 10E3/UL (ref 1.6–8.3)
NEUTROPHILS NFR BLD AUTO: 68 %
NRBC # BLD AUTO: 0 10E3/UL
NRBC BLD AUTO-RTO: 0 /100
PLATELET # BLD AUTO: 320 10E3/UL (ref 150–450)
POTASSIUM BLD-SCNC: 4 MMOL/L (ref 3.4–5.3)
PROT SERPL-MCNC: 7.3 G/DL (ref 6.8–8.8)
RBC # BLD AUTO: 3.4 10E6/UL (ref 3.8–5.2)
SODIUM SERPL-SCNC: 143 MMOL/L (ref 133–144)
WBC # BLD AUTO: 6.4 10E3/UL (ref 4–11)

## 2022-08-03 PROCEDURE — 250N000011 HC RX IP 250 OP 636: Performed by: INTERNAL MEDICINE

## 2022-08-03 PROCEDURE — 96417 CHEMO IV INFUS EACH ADDL SEQ: CPT

## 2022-08-03 PROCEDURE — 96372 THER/PROPH/DIAG INJ SC/IM: CPT | Performed by: INTERNAL MEDICINE

## 2022-08-03 PROCEDURE — 96377 APPLICATON ON-BODY INJECTOR: CPT

## 2022-08-03 PROCEDURE — G0463 HOSPITAL OUTPT CLINIC VISIT: HCPCS | Mod: PN,RTG,25 | Performed by: INTERNAL MEDICINE

## 2022-08-03 PROCEDURE — 80053 COMPREHEN METABOLIC PANEL: CPT | Performed by: INTERNAL MEDICINE

## 2022-08-03 PROCEDURE — 96367 TX/PROPH/DG ADDL SEQ IV INF: CPT

## 2022-08-03 PROCEDURE — G0463 HOSPITAL OUTPT CLINIC VISIT: HCPCS | Mod: PN,RTG | Performed by: INTERNAL MEDICINE

## 2022-08-03 PROCEDURE — 258N000003 HC RX IP 258 OP 636: Performed by: INTERNAL MEDICINE

## 2022-08-03 PROCEDURE — 96413 CHEMO IV INFUSION 1 HR: CPT

## 2022-08-03 PROCEDURE — 96375 TX/PRO/DX INJ NEW DRUG ADDON: CPT

## 2022-08-03 PROCEDURE — 99214 OFFICE O/P EST MOD 30 MIN: CPT | Mod: 95 | Performed by: INTERNAL MEDICINE

## 2022-08-03 PROCEDURE — 85004 AUTOMATED DIFF WBC COUNT: CPT | Performed by: INTERNAL MEDICINE

## 2022-08-03 RX ORDER — ALBUTEROL SULFATE 90 UG/1
1-2 AEROSOL, METERED RESPIRATORY (INHALATION)
Status: CANCELLED
Start: 2022-08-03

## 2022-08-03 RX ORDER — DIPHENHYDRAMINE HYDROCHLORIDE 50 MG/ML
50 INJECTION INTRAMUSCULAR; INTRAVENOUS
Status: CANCELLED
Start: 2022-08-03

## 2022-08-03 RX ORDER — METHYLPREDNISOLONE SODIUM SUCCINATE 125 MG/2ML
125 INJECTION, POWDER, LYOPHILIZED, FOR SOLUTION INTRAMUSCULAR; INTRAVENOUS
Status: CANCELLED
Start: 2022-08-03

## 2022-08-03 RX ORDER — HEPARIN SODIUM (PORCINE) LOCK FLUSH IV SOLN 100 UNIT/ML 100 UNIT/ML
5 SOLUTION INTRAVENOUS
Status: DISCONTINUED | OUTPATIENT
Start: 2022-08-03 | End: 2022-08-03 | Stop reason: HOSPADM

## 2022-08-03 RX ORDER — ONDANSETRON 2 MG/ML
8 INJECTION INTRAMUSCULAR; INTRAVENOUS ONCE
Status: CANCELLED | OUTPATIENT
Start: 2022-08-03

## 2022-08-03 RX ORDER — ONDANSETRON 2 MG/ML
8 INJECTION INTRAMUSCULAR; INTRAVENOUS ONCE
Status: COMPLETED | OUTPATIENT
Start: 2022-08-03 | End: 2022-08-03

## 2022-08-03 RX ORDER — LORAZEPAM 2 MG/ML
0.5 INJECTION INTRAMUSCULAR EVERY 4 HOURS PRN
Status: CANCELLED | OUTPATIENT
Start: 2022-08-03

## 2022-08-03 RX ORDER — ALBUTEROL SULFATE 0.83 MG/ML
2.5 SOLUTION RESPIRATORY (INHALATION)
Status: CANCELLED | OUTPATIENT
Start: 2022-08-03

## 2022-08-03 RX ORDER — HEPARIN SODIUM (PORCINE) LOCK FLUSH IV SOLN 100 UNIT/ML 100 UNIT/ML
5 SOLUTION INTRAVENOUS
Status: CANCELLED | OUTPATIENT
Start: 2022-08-03

## 2022-08-03 RX ORDER — MEPERIDINE HYDROCHLORIDE 25 MG/ML
25 INJECTION INTRAMUSCULAR; INTRAVENOUS; SUBCUTANEOUS EVERY 30 MIN PRN
Status: CANCELLED | OUTPATIENT
Start: 2022-08-03

## 2022-08-03 RX ORDER — NALOXONE HYDROCHLORIDE 0.4 MG/ML
0.2 INJECTION, SOLUTION INTRAMUSCULAR; INTRAVENOUS; SUBCUTANEOUS
Status: CANCELLED | OUTPATIENT
Start: 2022-08-03

## 2022-08-03 RX ORDER — EPINEPHRINE 1 MG/ML
0.3 INJECTION, SOLUTION INTRAMUSCULAR; SUBCUTANEOUS EVERY 5 MIN PRN
Status: CANCELLED | OUTPATIENT
Start: 2022-08-03

## 2022-08-03 RX ADMIN — CYCLOPHOSPHAMIDE 1235 MG: 1 INJECTION, POWDER, FOR SOLUTION INTRAVENOUS; ORAL at 11:48

## 2022-08-03 RX ADMIN — ONDANSETRON 8 MG: 2 INJECTION INTRAMUSCULAR; INTRAVENOUS at 09:44

## 2022-08-03 RX ADMIN — DEXAMETHASONE SODIUM PHOSPHATE: 10 INJECTION, SOLUTION INTRAMUSCULAR; INTRAVENOUS at 10:15

## 2022-08-03 RX ADMIN — DOCETAXEL 160 MG: 20 INJECTION, SOLUTION, CONCENTRATE INTRAVENOUS at 10:44

## 2022-08-03 RX ADMIN — FAMOTIDINE 20 MG: 10 INJECTION INTRAVENOUS at 09:45

## 2022-08-03 RX ADMIN — Medication 5 ML: at 12:37

## 2022-08-03 RX ADMIN — SODIUM CHLORIDE 250 ML: 9 INJECTION, SOLUTION INTRAVENOUS at 09:43

## 2022-08-03 RX ADMIN — PEGFILGRASTIM 6 MG: KIT SUBCUTANEOUS at 12:37

## 2022-08-03 NOTE — PROGRESS NOTES
Infusion Nursing Note:  Risa Alcaraz presents today for C2 D1 Taxotere/Cytoxan.    Patient seen by provider today: Yes: (Video Visit Dr. Mars)   present during visit today: Not Applicable.    Note: Pt reports no changes or concerns. Pt does cold therapy with Taxotere infusion.    Intravenous Access:  Implanted Port.    Treatment Conditions:  Lab Results   Component Value Date    HGB 11.6 (L) 08/03/2022    WBC 6.4 08/03/2022    ANEUTAUTO 4.4 08/03/2022     08/03/2022      Lab Results   Component Value Date     08/03/2022    POTASSIUM 4.0 08/03/2022    CR 0.77 08/03/2022    JAMIL 8.8 08/03/2022    BILITOTAL 0.5 08/03/2022    ALBUMIN 3.6 08/03/2022    ALT 29 08/03/2022    AST 20 08/03/2022     Results reviewed, labs MET treatment parameters, ok to proceed with treatment.    Post Infusion Assessment:  Patient tolerated infusion without incident.  Blood return noted pre and post infusion.  Site patent and intact, free from redness, edema or discomfort.  No evidence of extravasations.  Access discontinued per protocol.     Discharge Plan:   Discharge instructions reviewed with: Patient.  Patient and/or family verbalized understanding of discharge instructions and all questions answered.  Patient discharged in stable condition accompanied by: self.  Departure Mode: Ambulatory.      Jayshree Champagne RN

## 2022-08-03 NOTE — PROGRESS NOTES
PAC labs drawn and sent. Port accessed without difficulty. Good blood return noted.    Jayshree Champagne RN

## 2022-08-03 NOTE — LETTER
8/3/2022         RE: Risa Alcaraz  [address] [facility] [id number]        Dear Colleague,    Thank you for referring your patient, Risa Alcaraz, to the [facility]. Please see a copy of my visit note below.    Risa is a 51 year old who is being evaluated via a billable video visit.  Currently in [place].    How would you like to obtain your AVS? MyChart  If the video visit is dropped, the invitation should be resent by:  Email:[email or web address]  Will anyone else be joining your video visit? No       RAMIRO Rowe      Video-Visit Details    Video Start Time: 8:58 AM    Type of service:  Video Visit    Video End Time:9:05 AM    Originating Location (pt. Location): Other Clinic    Distant Location (provider location):  [facility]     Platform used for Video Visit: [facility]    Hematology/Oncology Established Patient Note      Today's Date: 8/3/22    Reason for follow-up: Right breast cancer.    HISTORY OF PRESENT ILLNESS: Risa Alcaraz is a 51 year old female who presents with the following oncologic history:  1.  4/22/2022: Mammogram showed distortion in the central right breast.  Left breast is negative.  2.  5/2/2022: Diagnostic right mammogram showed distortion in the retroareolar breast.  Targeted ultrasound of the right breast at 12:00, 2 cm from the nipple measured 1.8 x 1.3 cm.  Survey of axilla showed no evidence of adenopathy.  3.  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  4. 5/24/2022: Breast MRI showed nonmass enhancement at the 12:00 position of the RIGHT breast corresponds to biopsy-proven malignancy and measures 4.3 x 2.3 x 3.2 cm. Nodular nonmass  enhancement at the 12:00 position of the LEFT breast. No evidence of adenopathy.  5. 5/25/2022: CT chest/abdomen/pelvis showed 2 mm pulmonary nodule in right upper lobe posteriorly; mildly prominent prevascular lymph node between the right brachiocephalic and left common carotid arteries measures 8 mm, upper limits of normal; no definite evidence of metastatic disease. NM bone scan showed no bone metastases.  6. 6/1/2022: Left breast U/S showed hypoechoic area measuring 2 cm at 11:00, 5 cm from nipple corresponding to MRI finding. Biopsy of left breast lesion showed nuclear grade 1 DCIS; no evidence of invasive carcinoma or LVI, ER and NM both 100% positive. After breast tumor board discussion, consensus was for patient to proceed with surgery upfront followed by adjuvant chemotherapy.  7. 6/9/2022: BRCA gene panel negative.  8. 6/15/2022: Underwent bilateral skin sparing mastectomies with bilateral axillary sentinel lymph node excision, port placement under care of Dr. Daria Sigala and reconstruction with tissue expanders with Dr. Charles Ladd.  Pathology showed 8 x 5 mm right breast invasive mammary carcinoma with features of low-grade fibromatosis like metaplastic carcinoma and without extension to margins.  Right breast tumor was multifocal with total of 3 foci: 8 mm, 3 mm, 2 mm.  Extensive DCIS, grade 1 and 2 with involvement of sclerosing adenosis and cancerization of lobules and without extension of margins.  A single right axillary lymph node is negative for metastatic disease.  Left breast showed extensive DCIS involving extensive sclerosing adenosis without extension to margins.  No evidence of invasive carcinoma in the left breast.  A single left axillary lymph node is negative for metastatic disease.  9.  7/13/2022: Started adjuvant chemotherapy with Taxotere and Cytoxan.    INTERVAL HISTORY:  Risa reports a few days of feeling unwell post-chemo. She reports some tongue tingling and fingertip  paresthesias that have subsided after 1 week post-chemo.  She had metallic taste that has now recovered. She had bone pain after cycle 1.    REVIEW OF SYSTEMS:   14 point ROS was reviewed and is negative other than as noted above in HPI.       HOME MEDICATIONS:  Current Outpatient Medications   Medication Sig Dispense Refill     acetaminophen (TYLENOL) 325 MG tablet Take 325-650 mg by mouth every 6 hours as needed for mild pain       ibuprofen (ADVIL/MOTRIN) 600 MG tablet Take 600 mg by mouth every 6 hours as needed for moderate pain       multivitamin w/minerals (THERA-VIT-M) tablet Take 1 tablet by mouth daily       paragard intrauterine copper device 1 each by Intrauterine route once       vitamin C (ASCORBIC ACID) 1000 MG TABS Take 1,000 mg by mouth daily       dexamethasone (DECADRON) 4 MG tablet Take 2 tablets (8 mg) by mouth 2 times daily (with meals) for 3 doses Start evening of Docetaxel infusion and continue for a total of 3 doses. (Patient not taking: Reported on 7/12/2022) 6 tablet 3     ondansetron (ZOFRAN ODT) 4 MG ODT tab Take 1 tablet (4 mg) by mouth every 6 hours as needed for nausea (Patient not taking: No sig reported) 10 tablet 0     ondansetron (ZOFRAN) 8 MG tablet Take 1 tablet (8 mg) by mouth every 8 hours as needed for nausea (vomiting) (Patient not taking: No sig reported) 30 tablet 2     prochlorperazine (COMPAZINE) 10 MG tablet Take 1 tablet (10 mg) by mouth every 6 hours as needed for nausea or vomiting (Patient not taking: No sig reported) 30 tablet 2         ALLERGIES:  No Known Allergies      PAST MEDICAL HISTORY:  Past Medical History:   Diagnosis Date     Obese          PAST SURGICAL HISTORY:  Past Surgical History:   Procedure Laterality Date     BIOPSY NODE SENTINEL Bilateral 6/15/2022    Procedure: BILATERAL AXILLARY SENTINEL LYMPH NODE BIOPSIES;  Surgeon: Daria Sigala MD;  Location: [facility] OR     INSERT PORT VASCULAR ACCESS N/A 6/15/2022    Procedure: PORT PLACEMENT;  Surgeon:  Daria Sigala MD;  Location:  OR     MASTECTOMY SIMPLE BILATERAL Bilateral 6/15/2022    Procedure: BILATERAL SKIN SPARING MASTECTOMIES;  Surgeon: Daria Sigala MD;  Location:  OR     RECONSTRUCT BREAST, INSERT TISSUE EXPANDER BILATERAL, COMBINED Bilateral 6/15/2022    Procedure: BILATERAL BREAST RECONSTRUCTION WITH TISSUE EXPANDERS;  Surgeon: Charles Ladd MD;  Location:  OR     ZZC ORAL SURGERY PROCEDURE      wisdom teeth, hypotension with anesthesia         SOCIAL HISTORY:  Social History     Socioeconomic History     Marital status:      Spouse name: Not on file     Number of children: Not on file     Years of education: Not on file     Highest education level: Not on file   Occupational History     Employer: [employer]   Tobacco Use     Smoking status: Former Smoker     Types: Cigarettes, Cigars     Quit date:      Years since quittin.5     Smokeless tobacco: Never Used     Tobacco comment: Quit    Vaping Use     Vaping Use: Never used   Substance and Sexual Activity     Alcohol use: Yes     Comment: occ     Drug use: No     Sexual activity: Yes     Partners: Male     Birth control/protection: I.U.D.     Comment: mirena    Other Topics Concern     Parent/sibling w/ CABG, MI or angioplasty before 65F 55M? Not Asked   Social History Narrative     Not on file     Social Determinants of Health     Financial Resource Strain: Not on file   Food Insecurity: Not on file   Transportation Needs: Not on file   Physical Activity: Not on file   Stress: Not on file   Social Connections: Not on file   Intimate Partner Violence: Not on file   Housing Stability: Not on file         FAMILY HISTORY:  Family History   Problem Relation Age of Onset     Diabetes Mother      Hypertension Mother      Cancer Father         penial     Hypertension Father      Cancer Maternal Grandmother         liver     Heart Disease Maternal Grandfather         MI     Cancer Paternal Grandmother          brain     Heart Disease Paternal Grandfather         MI         PHYSICAL EXAM:  Vital signs:  There were no vitals taken for this visit.   GENERAL: No acute distress.  EYES: No scleral icterus. No overt erythema.  RESPIRATORY: No audible cough, wheezing, or labored breathing.  MUSCULOSKELETAL: Range of motion in the neck, shoulders, and arms appear normal.  SKIN: No overt rashes, discolorations, or lesions over the face and neck.  NEUROLOGIC: Alert.  No overt tremors.  PSYCHIATRIC: Normal affect and mood.  Does not appear anxious.    LABS:  CBC RESULTS: Recent Labs   Lab Test 08/03/22  0802   WBC 6.4   RBC 3.40*   HGB 11.6*   HCT 35.0   *   MCH 34.1*   MCHC 33.1   RDW 13.7        Last Comprehensive Metabolic Panel:  Sodium   Date Value Ref Range Status   07/12/2022 142 133 - 144 mmol/L Final     Potassium   Date Value Ref Range Status   07/12/2022 4.2 3.4 - 5.3 mmol/L Final     Chloride   Date Value Ref Range Status   07/12/2022 113 (H) 94 - 109 mmol/L Final     Carbon Dioxide (CO2)   Date Value Ref Range Status   07/12/2022 23 20 - 32 mmol/L Final     Anion Gap   Date Value Ref Range Status   07/12/2022 6 3 - 14 mmol/L Final     Glucose   Date Value Ref Range Status   07/12/2022 91 70 - 99 mg/dL Final   01/30/2015 95 70 - 99 mg/dL Final     Comment:     Effective 7/30/2014, the reference range for this assay has changed to reflect   new instrumentation/methodology.       Urea Nitrogen   Date Value Ref Range Status   07/12/2022 18 7 - 30 mg/dL Final     Creatinine   Date Value Ref Range Status   07/12/2022 0.79 0.52 - 1.04 mg/dL Final     GFR Estimate   Date Value Ref Range Status   07/12/2022 >90 >60 mL/min/1.73m2 Final     Comment:     Effective December 21, 2021 eGFRcr in adults is calculated using the 2021 CKD-EPI creatinine equation which includes age and gender (Luh et al., NEJ, DOI: 10.1056/FRIRxb4372489)     Calcium   Date Value Ref Range Status   07/12/2022 9.0 8.5 - 10.1 mg/dL Final      Bilirubin Total   Date Value Ref Range Status   07/12/2022 0.5 0.2 - 1.3 mg/dL Final     Alkaline Phosphatase   Date Value Ref Range Status   07/12/2022 91 40 - 150 U/L Final     ALT   Date Value Ref Range Status   07/12/2022 19 0 - 50 U/L Final     AST   Date Value Ref Range Status   07/12/2022 18 0 - 45 U/L Final         PATHOLOGY:  None new since prior visit.    IMAGING:  None new since prior visit.    ASSESSMENT/PLAN:  Risa Alcaraz is a 51 year old female with the following issues:  1.  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 Left breast DCIS  - Risa is status post bilateral mastectomies with negative surgical margins and no lymph node involvement.   -- She commenced adjuvant chemotherapy with Taxotere and Cytoxan on 7/13/2022 for planned every 3 weeks for a total of 4 cycles to reduce her risk of breast cancer recurrence.  -Discussed with Risa that her labs showed hemoglobin 11.6, WBCs 6.4, platelets 320,000.  Her blood counts have adequately recovered to proceed with cycle 2 Taxotere and Cytoxan today.  -She wishes to continue her chemotherapy closer to home in [place].  She can see a nurse practitioner in [place] with cycle 3 and a video visit with me for cycle 4.  - I did recommend continuing to cool her hands and feet during the Taxotere infusions to reduce the risk of peripheral neuropathy.  - Discussed that her overall prognosis is still excellent given the lower tendency for the fibromatosis low-grade type of metaplastic cancer to metastasize distantly or to the lymph nodes.  - Discussed that surveillance following completion of chemotherapy would largely be based on history and physical exams with imaging and lab work-up reserved for any concerning signs or symptoms that could arise.    3. Indeterminate pulmonary nodule  --Will reevaluate with  6-month interval chest CT at the end of 11/2022 or early 12/2020.    4.  Diffuse bone pain  - Related to Neulasta.  - Advised use of Claritin to prevent bone pain.    Briseyda Mars MD  Hematology/Oncology  [facility]    Total time spent: 30 minutes in patient evaluation, counseling, documentation, and coordination of care.       Again, thank you for allowing me to participate in the care of your patient.        Sincerely,        Briseyda Mars MD

## 2022-08-03 NOTE — LETTER
8/3/2022         RE: Risa Alcaraz  [address] [facility] [address]        Dear Colleague,    Thank you for referring your patient, Risa Alcaraz, to the [facility]. Please see a copy of my visit note below.    Risa is a 51 year old who is being evaluated via a billable video visit.  Currently in [place].    How would you like to obtain your AVS? MyChart  If the video visit is dropped, the invitation should be resent by:  Email:[email or web address]  Will anyone else be joining your video visit? No       RAMIRO Rowe      Video-Visit Details    Video Start Time: 8:58 AM    Type of service:  Video Visit    Video End Time:9:05 AM    Originating Location (pt. Location): Other Clinic    Distant Location (provider location):  [facility]     Platform used for Video Visit: [facility]    Hematology/Oncology Established Patient Note      Today's Date: 8/3/22    Reason for follow-up: Right breast cancer.    HISTORY OF PRESENT ILLNESS: Risa Alcaraz is a 51 year old female who presents with the following oncologic history:  1.  4/22/2022: Mammogram showed distortion in the central right breast.  Left breast is negative.  2.  5/2/2022: Diagnostic right mammogram showed distortion in the retroareolar breast.  Targeted ultrasound of the right breast at 12:00, 2 cm from the nipple measured 1.8 x 1.3 cm.  Survey of axilla showed no evidence of adenopathy.  3.  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  4. 5/24/2022: Breast MRI showed nonmass enhancement at the 12:00 position of the RIGHT breast corresponds to biopsy-proven malignancy and measures 4.3 x 2.3 x 3.2 cm. Nodular nonmass  enhancement at the 12:00 position of the LEFT breast. No evidence of adenopathy.  5. 5/25/2022: CT chest/abdomen/pelvis showed 2 mm pulmonary nodule in right upper lobe posteriorly; mildly prominent prevascular lymph node between the right brachiocephalic and left common carotid arteries measures 8 mm, upper limits of normal; no definite evidence of metastatic disease. NM bone scan showed no bone metastases.  6. 6/1/2022: Left breast U/S showed hypoechoic area measuring 2 cm at 11:00, 5 cm from nipple corresponding to MRI finding. Biopsy of left breast lesion showed nuclear grade 1 DCIS; no evidence of invasive carcinoma or LVI, ER and LA both 100% positive. After breast tumor board discussion, consensus was for patient to proceed with surgery upfront followed by adjuvant chemotherapy.  7. 6/9/2022: BRCA gene panel negative.  8. 6/15/2022: Underwent bilateral skin sparing mastectomies with bilateral axillary sentinel lymph node excision, port placement under care of Dr. Daria Sigala and reconstruction with tissue expanders with Dr. Charles Ladd.  Pathology showed 8 x 5 mm right breast invasive mammary carcinoma with features of low-grade fibromatosis like metaplastic carcinoma and without extension to margins.  Right breast tumor was multifocal with total of 3 foci: 8 mm, 3 mm, 2 mm.  Extensive DCIS, grade 1 and 2 with involvement of sclerosing adenosis and cancerization of lobules and without extension of margins.  A single right axillary lymph node is negative for metastatic disease.  Left breast showed extensive DCIS involving extensive sclerosing adenosis without extension to margins.  No evidence of invasive carcinoma in the left breast.  A single left axillary lymph node is negative for metastatic disease.  9.  7/13/2022: Started adjuvant chemotherapy with Taxotere and Cytoxan.    INTERVAL HISTORY:  Risa reports a few days of feeling unwell post-chemo. She reports some tongue tingling and fingertip  paresthesias that have subsided after 1 week post-chemo.  She had metallic taste that has now recovered. She had bone pain after cycle 1.    REVIEW OF SYSTEMS:   14 point ROS was reviewed and is negative other than as noted above in HPI.       HOME MEDICATIONS:  Current Outpatient Medications   Medication Sig Dispense Refill     acetaminophen (TYLENOL) 325 MG tablet Take 325-650 mg by mouth every 6 hours as needed for mild pain       ibuprofen (ADVIL/MOTRIN) 600 MG tablet Take 600 mg by mouth every 6 hours as needed for moderate pain       multivitamin w/minerals (THERA-VIT-M) tablet Take 1 tablet by mouth daily       paragard intrauterine copper device 1 each by Intrauterine route once       vitamin C (ASCORBIC ACID) 1000 MG TABS Take 1,000 mg by mouth daily       dexamethasone (DECADRON) 4 MG tablet Take 2 tablets (8 mg) by mouth 2 times daily (with meals) for 3 doses Start evening of Docetaxel infusion and continue for a total of 3 doses. (Patient not taking: Reported on 7/12/2022) 6 tablet 3     ondansetron (ZOFRAN ODT) 4 MG ODT tab Take 1 tablet (4 mg) by mouth every 6 hours as needed for nausea (Patient not taking: No sig reported) 10 tablet 0     ondansetron (ZOFRAN) 8 MG tablet Take 1 tablet (8 mg) by mouth every 8 hours as needed for nausea (vomiting) (Patient not taking: No sig reported) 30 tablet 2     prochlorperazine (COMPAZINE) 10 MG tablet Take 1 tablet (10 mg) by mouth every 6 hours as needed for nausea or vomiting (Patient not taking: No sig reported) 30 tablet 2         ALLERGIES:  No Known Allergies      PAST MEDICAL HISTORY:  Past Medical History:   Diagnosis Date     Obese          PAST SURGICAL HISTORY:  Past Surgical History:   Procedure Laterality Date     BIOPSY NODE SENTINEL Bilateral 6/15/2022    Procedure: BILATERAL AXILLARY SENTINEL LYMPH NODE BIOPSIES;  Surgeon: Daria Sigala MD;  Location: [facility] OR     INSERT PORT VASCULAR ACCESS N/A 6/15/2022    Procedure: PORT PLACEMENT;  Surgeon:  Daria Sigala MD;  Location:  OR     MASTECTOMY SIMPLE BILATERAL Bilateral 6/15/2022    Procedure: BILATERAL SKIN SPARING MASTECTOMIES;  Surgeon: Daria Sigala MD;  Location:  OR     RECONSTRUCT BREAST, INSERT TISSUE EXPANDER BILATERAL, COMBINED Bilateral 6/15/2022    Procedure: BILATERAL BREAST RECONSTRUCTION WITH TISSUE EXPANDERS;  Surgeon: Charles Ladd MD;  Location:  OR     ZZC ORAL SURGERY PROCEDURE      wisdom teeth, hypotension with anesthesia         SOCIAL HISTORY:  Social History     Socioeconomic History     Marital status:      Spouse name: Not on file     Number of children: Not on file     Years of education: Not on file     Highest education level: Not on file   Occupational History     Employer: [employer]   Tobacco Use     Smoking status: Former Smoker     Types: Cigarettes, Cigars     Quit date:      Years since quittin.5     Smokeless tobacco: Never Used     Tobacco comment: Quit    Vaping Use     Vaping Use: Never used   Substance and Sexual Activity     Alcohol use: Yes     Comment: occ     Drug use: No     Sexual activity: Yes     Partners: Male     Birth control/protection: I.U.D.     Comment: mirena    Other Topics Concern     Parent/sibling w/ CABG, MI or angioplasty before 65F 55M? Not Asked   Social History Narrative     Not on file     Social Determinants of Health     Financial Resource Strain: Not on file   Food Insecurity: Not on file   Transportation Needs: Not on file   Physical Activity: Not on file   Stress: Not on file   Social Connections: Not on file   Intimate Partner Violence: Not on file   Housing Stability: Not on file         FAMILY HISTORY:  Family History   Problem Relation Age of Onset     Diabetes Mother      Hypertension Mother      Cancer Father         penial     Hypertension Father      Cancer Maternal Grandmother         liver     Heart Disease Maternal Grandfather         MI     Cancer Paternal Grandmother          brain     Heart Disease Paternal Grandfather         MI         PHYSICAL EXAM:  Vital signs:  There were no vitals taken for this visit.   GENERAL: No acute distress.  EYES: No scleral icterus. No overt erythema.  RESPIRATORY: No audible cough, wheezing, or labored breathing.  MUSCULOSKELETAL: Range of motion in the neck, shoulders, and arms appear normal.  SKIN: No overt rashes, discolorations, or lesions over the face and neck.  NEUROLOGIC: Alert.  No overt tremors.  PSYCHIATRIC: Normal affect and mood.  Does not appear anxious.    LABS:  CBC RESULTS: Recent Labs   Lab Test 08/03/22  0802   WBC 6.4   RBC 3.40*   HGB 11.6*   HCT 35.0   *   MCH 34.1*   MCHC 33.1   RDW 13.7        Last Comprehensive Metabolic Panel:  Sodium   Date Value Ref Range Status   07/12/2022 142 133 - 144 mmol/L Final     Potassium   Date Value Ref Range Status   07/12/2022 4.2 3.4 - 5.3 mmol/L Final     Chloride   Date Value Ref Range Status   07/12/2022 113 (H) 94 - 109 mmol/L Final     Carbon Dioxide (CO2)   Date Value Ref Range Status   07/12/2022 23 20 - 32 mmol/L Final     Anion Gap   Date Value Ref Range Status   07/12/2022 6 3 - 14 mmol/L Final     Glucose   Date Value Ref Range Status   07/12/2022 91 70 - 99 mg/dL Final   01/30/2015 95 70 - 99 mg/dL Final     Comment:     Effective 7/30/2014, the reference range for this assay has changed to reflect   new instrumentation/methodology.       Urea Nitrogen   Date Value Ref Range Status   07/12/2022 18 7 - 30 mg/dL Final     Creatinine   Date Value Ref Range Status   07/12/2022 0.79 0.52 - 1.04 mg/dL Final     GFR Estimate   Date Value Ref Range Status   07/12/2022 >90 >60 mL/min/1.73m2 Final     Comment:     Effective December 21, 2021 eGFRcr in adults is calculated using the 2021 CKD-EPI creatinine equation which includes age and gender (Luh et al., NEJ, DOI: 10.1056/OMMQjy3997009)     Calcium   Date Value Ref Range Status   07/12/2022 9.0 8.5 - 10.1 mg/dL Final      Bilirubin Total   Date Value Ref Range Status   07/12/2022 0.5 0.2 - 1.3 mg/dL Final     Alkaline Phosphatase   Date Value Ref Range Status   07/12/2022 91 40 - 150 U/L Final     ALT   Date Value Ref Range Status   07/12/2022 19 0 - 50 U/L Final     AST   Date Value Ref Range Status   07/12/2022 18 0 - 45 U/L Final         PATHOLOGY:  None new since prior visit.    IMAGING:  None new since prior visit.    ASSESSMENT/PLAN:  Risa Alcaraz is a 51 year old female with the following issues:  1.  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 Left breast DCIS  - Risa is status post bilateral mastectomies with negative surgical margins and no lymph node involvement.   -- She commenced adjuvant chemotherapy with Taxotere and Cytoxan on 7/13/2022 for planned every 3 weeks for a total of 4 cycles to reduce her risk of breast cancer recurrence.  -Discussed with Risa that her labs showed hemoglobin 11.6, WBCs 6.4, platelets 320,000.  Her blood counts have adequately recovered to proceed with cycle 2 Taxotere and Cytoxan today.  -She wishes to continue her chemotherapy closer to home in [place].  She can see a nurse practitioner in [place] with cycle 3 and a video visit with me for cycle 4.  - I did recommend continuing to cool her hands and feet during the Taxotere infusions to reduce the risk of peripheral neuropathy.  - Discussed that her overall prognosis is still excellent given the lower tendency for the fibromatosis low-grade type of metaplastic cancer to metastasize distantly or to the lymph nodes.  - Discussed that surveillance following completion of chemotherapy would largely be based on history and physical exams with imaging and lab work-up reserved for any concerning signs or symptoms that could arise.    3. Indeterminate pulmonary nodule  --Will reevaluate with  6-month interval chest CT at the end of 11/2022 or early 12/2020.    4.  Diffuse bone pain  - Related to Neulasta.  - Advised use of Claritin to prevent bone pain.    Briseyda Mars MD  Hematology/Oncology  [facility]    Total time spent: 30 minutes in patient evaluation, counseling, documentation, and coordination of care.       Again, thank you for allowing me to participate in the care of your patient.        Sincerely,        Briseyda Mars MD

## 2022-08-21 RX ORDER — ALBUTEROL SULFATE 0.83 MG/ML
2.5 SOLUTION RESPIRATORY (INHALATION)
Status: CANCELLED | OUTPATIENT
Start: 2022-08-24

## 2022-08-21 RX ORDER — ALBUTEROL SULFATE 90 UG/1
1-2 AEROSOL, METERED RESPIRATORY (INHALATION)
Status: CANCELLED
Start: 2022-08-24

## 2022-08-21 RX ORDER — LORAZEPAM 2 MG/ML
0.5 INJECTION INTRAMUSCULAR EVERY 4 HOURS PRN
Status: CANCELLED | OUTPATIENT
Start: 2022-08-24

## 2022-08-21 RX ORDER — DIPHENHYDRAMINE HYDROCHLORIDE 50 MG/ML
50 INJECTION INTRAMUSCULAR; INTRAVENOUS
Status: CANCELLED
Start: 2022-08-24

## 2022-08-21 RX ORDER — EPINEPHRINE 1 MG/ML
0.3 INJECTION, SOLUTION, CONCENTRATE INTRAVENOUS EVERY 5 MIN PRN
Status: CANCELLED | OUTPATIENT
Start: 2022-08-24

## 2022-08-21 RX ORDER — NALOXONE HYDROCHLORIDE 0.4 MG/ML
0.2 INJECTION, SOLUTION INTRAMUSCULAR; INTRAVENOUS; SUBCUTANEOUS
Status: CANCELLED | OUTPATIENT
Start: 2022-08-24

## 2022-08-21 RX ORDER — HEPARIN SODIUM (PORCINE) LOCK FLUSH IV SOLN 100 UNIT/ML 100 UNIT/ML
5 SOLUTION INTRAVENOUS
Status: CANCELLED | OUTPATIENT
Start: 2022-08-24

## 2022-08-21 RX ORDER — ONDANSETRON 2 MG/ML
8 INJECTION INTRAMUSCULAR; INTRAVENOUS ONCE
Status: CANCELLED | OUTPATIENT
Start: 2022-08-24

## 2022-08-21 RX ORDER — MEPERIDINE HYDROCHLORIDE 25 MG/ML
25 INJECTION INTRAMUSCULAR; INTRAVENOUS; SUBCUTANEOUS EVERY 30 MIN PRN
Status: CANCELLED | OUTPATIENT
Start: 2022-08-24

## 2022-08-24 ENCOUNTER — INFUSION THERAPY VISIT (OUTPATIENT)
Dept: INFUSION THERAPY | Facility: CLINIC | Age: 52
End: 2022-08-24
Attending: NURSE PRACTITIONER
Payer: COMMERCIAL

## 2022-08-24 ENCOUNTER — ONCOLOGY VISIT (OUTPATIENT)
Dept: ONCOLOGY | Facility: CLINIC | Age: 52
End: 2022-08-24
Attending: NURSE PRACTITIONER
Payer: COMMERCIAL

## 2022-08-24 VITALS
SYSTOLIC BLOOD PRESSURE: 147 MMHG | OXYGEN SATURATION: 98 % | BODY MASS INDEX: 37.55 KG/M2 | HEART RATE: 84 BPM | RESPIRATION RATE: 12 BRPM | TEMPERATURE: 97.4 F | DIASTOLIC BLOOD PRESSURE: 81 MMHG | WEIGHT: 212 LBS

## 2022-08-24 VITALS — DIASTOLIC BLOOD PRESSURE: 89 MMHG | HEART RATE: 96 BPM | SYSTOLIC BLOOD PRESSURE: 124 MMHG

## 2022-08-24 DIAGNOSIS — C50.919 METAPLASTIC CARCINOMA OF BREAST (H): Primary | ICD-10-CM

## 2022-08-24 DIAGNOSIS — C50.111 MALIGNANT NEOPLASM OF CENTRAL PORTION OF RIGHT BREAST IN FEMALE, ESTROGEN RECEPTOR NEGATIVE (H): Primary | ICD-10-CM

## 2022-08-24 DIAGNOSIS — Z17.1 MALIGNANT NEOPLASM OF CENTRAL PORTION OF RIGHT BREAST IN FEMALE, ESTROGEN RECEPTOR NEGATIVE (H): Primary | ICD-10-CM

## 2022-08-24 DIAGNOSIS — D05.12 DUCTAL CARCINOMA IN SITU (DCIS) OF LEFT BREAST: ICD-10-CM

## 2022-08-24 LAB
ALBUMIN SERPL-MCNC: 3.4 G/DL (ref 3.4–5)
ALP SERPL-CCNC: 110 U/L (ref 40–150)
ALT SERPL W P-5'-P-CCNC: 22 U/L (ref 0–50)
ANION GAP SERPL CALCULATED.3IONS-SCNC: 7 MMOL/L (ref 3–14)
AST SERPL W P-5'-P-CCNC: 13 U/L (ref 0–45)
BASOPHILS # BLD AUTO: 0.1 10E3/UL (ref 0–0.2)
BASOPHILS NFR BLD AUTO: 1 %
BILIRUB SERPL-MCNC: 0.5 MG/DL (ref 0.2–1.3)
BUN SERPL-MCNC: 15 MG/DL (ref 7–30)
CALCIUM SERPL-MCNC: 9 MG/DL (ref 8.5–10.1)
CHLORIDE BLD-SCNC: 112 MMOL/L (ref 94–109)
CO2 SERPL-SCNC: 24 MMOL/L (ref 20–32)
CREAT SERPL-MCNC: 1.02 MG/DL (ref 0.52–1.04)
EOSINOPHIL # BLD AUTO: 0.1 10E3/UL (ref 0–0.7)
EOSINOPHIL NFR BLD AUTO: 1 %
ERYTHROCYTE [DISTWIDTH] IN BLOOD BY AUTOMATED COUNT: 14.2 % (ref 10–15)
GFR SERPL CREATININE-BSD FRML MDRD: 67 ML/MIN/1.73M2
GLUCOSE BLD-MCNC: 132 MG/DL (ref 70–99)
HCT VFR BLD AUTO: 33.9 % (ref 35–47)
HGB BLD-MCNC: 11.2 G/DL (ref 11.7–15.7)
IMM GRANULOCYTES # BLD: 0 10E3/UL
IMM GRANULOCYTES NFR BLD: 0 %
LYMPHOCYTES # BLD AUTO: 1.5 10E3/UL (ref 0.8–5.3)
LYMPHOCYTES NFR BLD AUTO: 20 %
MCH RBC QN AUTO: 33.6 PG (ref 26.5–33)
MCHC RBC AUTO-ENTMCNC: 33 G/DL (ref 31.5–36.5)
MCV RBC AUTO: 102 FL (ref 78–100)
MONOCYTES # BLD AUTO: 0.6 10E3/UL (ref 0–1.3)
MONOCYTES NFR BLD AUTO: 8 %
NEUTROPHILS # BLD AUTO: 5.1 10E3/UL (ref 1.6–8.3)
NEUTROPHILS NFR BLD AUTO: 70 %
NRBC # BLD AUTO: 0 10E3/UL
NRBC BLD AUTO-RTO: 0 /100
PLATELET # BLD AUTO: 264 10E3/UL (ref 150–450)
POTASSIUM BLD-SCNC: 4 MMOL/L (ref 3.4–5.3)
PROT SERPL-MCNC: 7 G/DL (ref 6.8–8.8)
RBC # BLD AUTO: 3.33 10E6/UL (ref 3.8–5.2)
SODIUM SERPL-SCNC: 143 MMOL/L (ref 133–144)
WBC # BLD AUTO: 7.3 10E3/UL (ref 4–11)

## 2022-08-24 PROCEDURE — 96377 APPLICATON ON-BODY INJECTOR: CPT | Mod: XS | Performed by: INTERNAL MEDICINE

## 2022-08-24 PROCEDURE — 96417 CHEMO IV INFUS EACH ADDL SEQ: CPT

## 2022-08-24 PROCEDURE — G0463 HOSPITAL OUTPT CLINIC VISIT: HCPCS | Mod: 25

## 2022-08-24 PROCEDURE — 96372 THER/PROPH/DIAG INJ SC/IM: CPT | Performed by: INTERNAL MEDICINE

## 2022-08-24 PROCEDURE — 96413 CHEMO IV INFUSION 1 HR: CPT

## 2022-08-24 PROCEDURE — 258N000003 HC RX IP 258 OP 636: Performed by: INTERNAL MEDICINE

## 2022-08-24 PROCEDURE — 96375 TX/PRO/DX INJ NEW DRUG ADDON: CPT

## 2022-08-24 PROCEDURE — 99214 OFFICE O/P EST MOD 30 MIN: CPT | Performed by: NURSE PRACTITIONER

## 2022-08-24 PROCEDURE — 250N000011 HC RX IP 250 OP 636: Performed by: INTERNAL MEDICINE

## 2022-08-24 PROCEDURE — 85025 COMPLETE CBC W/AUTO DIFF WBC: CPT | Performed by: NURSE PRACTITIONER

## 2022-08-24 PROCEDURE — 80053 COMPREHEN METABOLIC PANEL: CPT | Performed by: NURSE PRACTITIONER

## 2022-08-24 PROCEDURE — 96367 TX/PROPH/DG ADDL SEQ IV INF: CPT

## 2022-08-24 RX ORDER — ONDANSETRON 2 MG/ML
8 INJECTION INTRAMUSCULAR; INTRAVENOUS ONCE
Status: COMPLETED | OUTPATIENT
Start: 2022-08-24 | End: 2022-08-24

## 2022-08-24 RX ORDER — HEPARIN SODIUM (PORCINE) LOCK FLUSH IV SOLN 100 UNIT/ML 100 UNIT/ML
5 SOLUTION INTRAVENOUS
Status: DISCONTINUED | OUTPATIENT
Start: 2022-08-24 | End: 2022-08-24 | Stop reason: HOSPADM

## 2022-08-24 RX ADMIN — FAMOTIDINE 20 MG: 10 INJECTION INTRAVENOUS at 10:33

## 2022-08-24 RX ADMIN — DOCETAXEL 160 MG: 20 INJECTION, SOLUTION, CONCENTRATE INTRAVENOUS at 11:08

## 2022-08-24 RX ADMIN — ONDANSETRON 8 MG: 2 INJECTION INTRAMUSCULAR; INTRAVENOUS at 10:35

## 2022-08-24 RX ADMIN — CYCLOPHOSPHAMIDE 1235 MG: 1 INJECTION, POWDER, FOR SOLUTION INTRAVENOUS; ORAL at 12:16

## 2022-08-24 RX ADMIN — HEPARIN 5 ML: 100 SYRINGE at 12:52

## 2022-08-24 RX ADMIN — PEGFILGRASTIM 6 MG: KIT SUBCUTANEOUS at 12:36

## 2022-08-24 RX ADMIN — DEXAMETHASONE SODIUM PHOSPHATE: 10 INJECTION, SOLUTION INTRAMUSCULAR; INTRAVENOUS at 10:42

## 2022-08-24 RX ADMIN — SODIUM CHLORIDE 250 ML: 9 INJECTION, SOLUTION INTRAVENOUS at 10:36

## 2022-08-24 ASSESSMENT — PAIN SCALES - GENERAL: PAINLEVEL: NO PAIN (0)

## 2022-08-24 NOTE — LETTER
"    8/24/2022         RE: Risa Alcaraz  [address] [facility] [address] [facility] [id number]        Dear Colleague,    Thank you for referring your patient, Risa Alcaraz, to the [facility]. Please see a copy of my visit note below.    Oncology Rooming Note    August 24, 2022 9:49 AM   Risa Alcaraz is a 51 year old female who presents for:    Chief Complaint   Patient presents with     Oncology Clinic Visit     Malignant neoplasm of central portion of right breast in female, estrogen receptor negative - Labs provider and infusion     Initial Vitals: BP (!) 147/81 (BP Location: Right arm, Patient Position: Sitting, Cuff Size: Adult Regular)   Pulse 84   Temp 97.4  F (36.3  C) (Tympanic)   Resp 12   Wt 96.2 kg (212 lb)   SpO2 98%   BMI 37.55 kg/m   Estimated body mass index is 37.55 kg/m  as calculated from the following:    Height as of 6/29/22: 1.6 m (5' 3\").    Weight as of this encounter: 96.2 kg (212 lb). Body surface area is 2.07 meters squared.  No Pain (0) Comment: Data Unavailable   No LMP recorded. (Menstrual status: IUD).  Allergies reviewed: Yes  Medications reviewed: Yes    Medications: Medication refills not needed today.  Pharmacy name entered into [facility]:    [facility], [place] - [address] [facility], [place] - [address] [place] [address]    Clinical concerns:  None      Lidia Connell, [facility] Hematology and Oncology Outpatient Progress Note    Patient: Risa Alcaraz  MRN: [id number]  Date of Service: Aug 24, 2022          Reason for Visit    1. 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 Mars ([facility])      Assessment/Plan  1.   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  Bone pain from Neulasta, but she did not tolerate Claritin " (dizziness).  Moderate diarrhea, staying well-hydrated    Labs hgb 11.2, rest CBC WNL; CMP WNL    Plan:  -Proceed with cycle 3 Taxotere + Cytoxan at same doses. Neulasta OnPro.   -Reviewed use of dexamethasone and antiemetics, she has picked these up  -She'd prefer not to use Claritin. Can take Tylenol 8764-2648 mg as needed for arthralgias  -Imodium as needed diarrhea, stay hydrated  -Dr. Mars recommended cooling hands + feet during Taxotere infusions to reduce risk for peripheral neuropathy  -Return in 3 weeks with Dr. Mars (virtual) ahead of cycle 4 chemo in [place]  -4 cycles total planned.   -If she needs any of her surveillance/exams in [place], I'm happy to see her     2.   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 Additional treatment not needed    3.   Pulmonary nodule  Indeterminate on staging CT.     Plan:  -6-month follow-up CT in late Nov/early Dec    4.   Covid prevention  She has not gotten vaccinated and has elected not to. Reviewed higher risks with chemo immunosuppression and she will take alternate precautions.   ______________________________________________________________________________    History of Present Illness/ Interval History    Ms. Risa Alcaraz is a 51 year old with right triple negative low-grade invasive breast cancer and left breast DCIS. She is s/p bilateral mastectomies and undergoing adjuvant chemo with Taxotere and Cytoxan.  Presents today ahead of cycle 3.    No nausea. Some metallic taste the first week Her fingertip and toe paresthesias were better this cycle with use of cooling therapy during infusions, mild persistent neuropathy in one left toe.  She had metallic taste that has now recovered. She had bone pain after cycle 1 so tried Claritin with cycle 2 which helped with the pain, but she had dizziness on Claritin so does not want to continue this. Loose stools (4/day) - drinking plenty of water. Not taking anything for it as she tends towards constipation  usually.    Healing well from her breast surgery.     ECOG Performance    0      Oncology History/Treatment  Diagnosis/Stage:   5/2022: Stage IA (lQ0o-F7-G7) right breast low-grade metaplastic cancer, triple negative AND left breast DCIS (ER/PA+)  -mammogram: distortion central R breast. L breast negative  -diagnostic mammogram: distortion retroareolar right breast. US R breast: 1.8 cm lesion right breast 12:00. No axillary adenopathy. US-biopsy invasive mammary ca with fatures of low-grade metaplastic carcinoma, fibromatosis type, associated DCIS, cribriform subtype, intermediate nuclear grade. ER neg, PA neg, HER2 neg.  -Breast MRI: 4.3 cm mass right breast correlating with biopsy proven cancer. Left breast showed non-mass enhancement 12:00. No adenopathy.   -US left breast: 2 cm hypoechoic area 11:00. Biopsy: grade 1 DCIS, no invasive ca or LVI. ER/%+.  -CTcap: No definite mets. Tiny lung nodules of indeterminate significance.   -Bone scan: no mets   -BRCA gene panel negative  -6/15/22 surgical path:  R breast mammary carcinoma with features of low-grade fibromatosis like metaplastic carcinoma; negative margins. Multifocal with total of 3 foci: 8 mm,  3mm, 2 mm. Extensive DCIS, grade 1 + 2 with involvement of sclerosing adenosis and cancerization of lobules, clear margins. Single R axillary LN negative.   L breast showed extensive DCIS involving extensive sclerosing adenosis, negative margins; no invasive carcinoma. Single L axillary LN negative.      Treatment:  6/15/2022: bilateral skin sparing mastectomies with bilateral axillary sLN excision and reconstruction with tissue expanders    7/13/22: adjuvant Taxotere + Cytoxan x 4 cycles planned    Physical Exam    GENERAL: Alert and oriented to time place and person. Seated comfortably. In no distress.  HEAD: Atraumatic and normocephalic. Complete alopecia.  EYES: DENYS, EOMI. No erythema. No icterus.  LYMPH NODES: No palpable supraclavicular, cervical,  axillary lymphadenopathy.  BREASTS: Bilateral mastectomy incisions well-healed. No signs infection.   CHEST: clear to auscultation bilaterally. Resonant to percussion throughout bilaterally. Symmetrical breath movements bilaterally.  CVS: RRR.  ABDOMEN: Soft. Not tender. Not distended. No palpable hepatomegaly or splenomegaly. No other mass palpable. Bowel sounds present.  EXTREMITIES: Warm. No peripheral edema.  SKIN: no rash, or bruising or purpura.   NEURO: No gross deficit noted. Non-antalgic gait.      Lab Results    Recent Results (from the past 168 hour(s))   Comprehensive metabolic panel   Result Value Ref Range    Sodium 143 133 - 144 mmol/L    Potassium 4.0 3.4 - 5.3 mmol/L    Chloride 112 (H) 94 - 109 mmol/L    Carbon Dioxide (CO2) 24 20 - 32 mmol/L    Anion Gap 7 3 - 14 mmol/L    Urea Nitrogen 15 7 - 30 mg/dL    Creatinine 1.02 0.52 - 1.04 mg/dL    Calcium 9.0 8.5 - 10.1 mg/dL    Glucose 132 (H) 70 - 99 mg/dL    Alkaline Phosphatase 110 40 - 150 U/L    AST 13 0 - 45 U/L    ALT 22 0 - 50 U/L    Protein Total 7.0 6.8 - 8.8 g/dL    Albumin 3.4 3.4 - 5.0 g/dL    Bilirubin Total 0.5 0.2 - 1.3 mg/dL    GFR Estimate 67 >60 mL/min/1.73m2   CBC with platelets and differential   Result Value Ref Range    WBC Count 7.3 4.0 - 11.0 10e3/uL    RBC Count 3.33 (L) 3.80 - 5.20 10e6/uL    Hemoglobin 11.2 (L) 11.7 - 15.7 g/dL    Hematocrit 33.9 (L) 35.0 - 47.0 %     (H) 78 - 100 fL    MCH 33.6 (H) 26.5 - 33.0 pg    MCHC 33.0 31.5 - 36.5 g/dL    RDW 14.2 10.0 - 15.0 %    Platelet Count 264 150 - 450 10e3/uL    % Neutrophils 70 %    % Lymphocytes 20 %    % Monocytes 8 %    % Eosinophils 1 %    % Basophils 1 %    % Immature Granulocytes 0 %    NRBCs per 100 WBC 0 <1 /100    Absolute Neutrophils 5.1 1.6 - 8.3 10e3/uL    Absolute Lymphocytes 1.5 0.8 - 5.3 10e3/uL    Absolute Monocytes 0.6 0.0 - 1.3 10e3/uL    Absolute Eosinophils 0.1 0.0 - 0.7 10e3/uL    Absolute Basophils 0.1 0.0 - 0.2 10e3/uL    Absolute Immature  Granulocytes 0.0 <=0.4 10e3/uL    Absolute NRBCs 0.0 10e3/uL       Imaging    No results found.    Billing  Total time 35 minutes, to include face to face visit, review of EMR, ordering, documentation and coordination of care on date of service    Signed by: Talia Bond NP          Again, thank you for allowing me to participate in the care of your patient.        Sincerely,        Talia Bond NP

## 2022-08-24 NOTE — PROGRESS NOTES
"Oncology Rooming Note    August 24, 2022 9:49 AM   Risa Alcaraz is a 51 year old female who presents for:    Chief Complaint   Patient presents with     Oncology Clinic Visit     Malignant neoplasm of central portion of right breast in female, estrogen receptor negative - Labs provider and infusion     Initial Vitals: BP (!) 147/81 (BP Location: Right arm, Patient Position: Sitting, Cuff Size: Adult Regular)   Pulse 84   Temp 97.4  F (36.3  C) (Tympanic)   Resp 12   Wt 96.2 kg (212 lb)   SpO2 98%   BMI 37.55 kg/m   Estimated body mass index is 37.55 kg/m  as calculated from the following:    Height as of 6/29/22: 1.6 m (5' 3\").    Weight as of this encounter: 96.2 kg (212 lb). Body surface area is 2.07 meters squared.  No Pain (0) Comment: Data Unavailable   No LMP recorded. (Menstrual status: IUD).  Allergies reviewed: Yes  Medications reviewed: Yes    Medications: Medication refills not needed today.  Pharmacy name entered into [facility]:    [facility] [place] [address] [facility], [place] - [address] [place]    Clinical concerns:  None      Lidia Connell CMA            " Spoke with pharmacy, pt ID [id number]  Call [phone]   i have requested form.

## 2022-08-24 NOTE — PROGRESS NOTES
[facility] Hematology and Oncology Outpatient Progress Note    Patient: Risa Alcaraz  MRN: [id number]  Date of Service: Aug 24, 2022          Reason for Visit    1. 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 Left breast ER/PA+ DCIS    Primary Hematologist/Oncologist: Dr. Mars ([facility])      Assessment/Plan  1.   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  Bone pain from Neulasta, but she did not tolerate Claritin (dizziness).  Moderate diarrhea, staying well-hydrated    Labs hgb 11.2, rest CBC WNL; CMP WNL    Plan:  -Proceed with cycle 3 Taxotere + Cytoxan at same doses. Neulasta OnPro.   -Reviewed use of dexamethasone and antiemetics, she has picked these up  -She'd prefer not to use Claritin. Can take Tylenol [id number] mg as needed for arthralgias  -Imodium as needed diarrhea, stay hydrated  -Dr. Mars recommended cooling hands + feet during Taxotere infusions to reduce risk for peripheral neuropathy  -Return in 3 weeks with Dr. Mars (virtual) ahead of cycle 4 chemo in [place]  -4 cycles total planned.   -If she needs any of her surveillance/exams in [place], I'm happy to see her     2.   Left breast ER/PA+ DCIS  Post-bilateral mastectomy. Additional treatment not needed    3.   Pulmonary nodule  Indeterminate on staging CT.     Plan:  -6-month follow-up CT in late Nov/early Dec    4.   Covid prevention  She has not gotten vaccinated and has elected not to. Reviewed higher risks with chemo immunosuppression and she will take alternate precautions.   ______________________________________________________________________________    History of Present Illness/ Interval History    Ms. Risa Alcaraz is a 51 year old with right triple negative low-grade invasive breast cancer and left breast DCIS. She is s/p bilateral mastectomies and undergoing adjuvant chemo with Taxotere and Cytoxan.  Presents today  ahead of cycle 3.    No nausea. Some metallic taste the first week Her fingertip and toe paresthesias were better this cycle with use of cooling therapy during infusions, mild persistent neuropathy in one left toe.  She had metallic taste that has now recovered. She had bone pain after cycle 1 so tried Claritin with cycle 2 which helped with the pain, but she had dizziness on Claritin so does not want to continue this. Loose stools (4/day) - drinking plenty of water. Not taking anything for it as she tends towards constipation usually.    Healing well from her breast surgery.     ECOG Performance    0      Oncology History/Treatment  Diagnosis/Stage:   5/2022: Stage IA ([id number]-N8-L0) right breast low-grade metaplastic cancer, triple negative AND left breast DCIS (ER/MD+)  -mammogram: distortion central R breast. L breast negative  -diagnostic mammogram: distortion retroareolar right breast. US R breast: 1.8 cm lesion right breast 12:00. No axillary adenopathy. US-biopsy invasive mammary ca with fatures of low-grade metaplastic carcinoma, fibromatosis type, associated DCIS, cribriform subtype, intermediate nuclear grade. ER neg, MD neg, HER2 neg.  -Breast MRI: 4.3 cm mass right breast correlating with biopsy proven cancer. Left breast showed non-mass enhancement 12:00. No adenopathy.   -US left breast: 2 cm hypoechoic area 11:00. Biopsy: grade 1 DCIS, no invasive ca or LVI. ER/%+.  -CTcap: No definite mets. Tiny lung nodules of indeterminate significance.   -Bone scan: no mets   -BRCA gene panel negative  -6/15/22 surgical path:  R breast mammary carcinoma with features of low-grade fibromatosis like metaplastic carcinoma; negative margins. Multifocal with total of 3 foci: 8 mm,  3mm, 2 mm. Extensive DCIS, grade 1 + 2 with involvement of sclerosing adenosis and cancerization of lobules, clear margins. Single R axillary LN negative.   L breast showed extensive DCIS involving extensive sclerosing adenosis, negative  margins; no invasive carcinoma. Single L axillary LN negative.      Treatment:  6/15/2022: bilateral skin sparing mastectomies with bilateral axillary sLN excision and reconstruction with tissue expanders    7/13/22: adjuvant Taxotere + Cytoxan x 4 cycles planned    Physical Exam    GENERAL: Alert and oriented to time place and person. Seated comfortably. In no distress.  HEAD: Atraumatic and normocephalic. Complete alopecia.  EYES: DENYS, EOMI. No erythema. No icterus.  LYMPH NODES: No palpable supraclavicular, cervical, axillary lymphadenopathy.  BREASTS: Bilateral mastectomy incisions well-healed. No signs infection.   CHEST: clear to auscultation bilaterally. Resonant to percussion throughout bilaterally. Symmetrical breath movements bilaterally.  CVS: RRR.  ABDOMEN: Soft. Not tender. Not distended. No palpable hepatomegaly or splenomegaly. No other mass palpable. Bowel sounds present.  EXTREMITIES: Warm. No peripheral edema.  SKIN: no rash, or bruising or purpura.   NEURO: No gross deficit noted. Non-antalgic gait.      Lab Results    Recent Results (from the past 168 hour(s))   Comprehensive metabolic panel   Result Value Ref Range    Sodium 143 133 - 144 mmol/L    Potassium 4.0 3.4 - 5.3 mmol/L    Chloride 112 (H) 94 - 109 mmol/L    Carbon Dioxide (CO2) 24 20 - 32 mmol/L    Anion Gap 7 3 - 14 mmol/L    Urea Nitrogen 15 7 - 30 mg/dL    Creatinine 1.02 0.52 - 1.04 mg/dL    Calcium 9.0 8.5 - 10.1 mg/dL    Glucose 132 (H) 70 - 99 mg/dL    Alkaline Phosphatase 110 40 - 150 U/L    AST 13 0 - 45 U/L    ALT 22 0 - 50 U/L    Protein Total 7.0 6.8 - 8.8 g/dL    Albumin 3.4 3.4 - 5.0 g/dL    Bilirubin Total 0.5 0.2 - 1.3 mg/dL    GFR Estimate 67 >60 mL/min/1.73m2   CBC with platelets and differential   Result Value Ref Range    WBC Count 7.3 4.0 - 11.0 10e3/uL    RBC Count 3.33 (L) 3.80 - 5.20 10e6/uL    Hemoglobin 11.2 (L) 11.7 - 15.7 g/dL    Hematocrit 33.9 (L) 35.0 - 47.0 %     (H) 78 - 100 fL    MCH 33.6 (H)  26.5 - 33.0 pg    MCHC 33.0 31.5 - 36.5 g/dL    RDW 14.2 10.0 - 15.0 %    Platelet Count 264 150 - 450 10e3/uL    % Neutrophils 70 %    % Lymphocytes 20 %    % Monocytes 8 %    % Eosinophils 1 %    % Basophils 1 %    % Immature Granulocytes 0 %    NRBCs per 100 WBC 0 <1 /100    Absolute Neutrophils 5.1 1.6 - 8.3 10e3/uL    Absolute Lymphocytes 1.5 0.8 - 5.3 10e3/uL    Absolute Monocytes 0.6 0.0 - 1.3 10e3/uL    Absolute Eosinophils 0.1 0.0 - 0.7 10e3/uL    Absolute Basophils 0.1 0.0 - 0.2 10e3/uL    Absolute Immature Granulocytes 0.0 <=0.4 10e3/uL    Absolute NRBCs 0.0 10e3/uL       Imaging    No results found.    Billing  Total time 35 minutes, to include face to face visit, review of EMR, ordering, documentation and coordination of care on date of service    Signed by: Talia Bond NP

## 2022-08-24 NOTE — PROGRESS NOTES
Infusion Nursing Note:  Risa Alcaraz presents today for Taxotere & Cytoxan.    Patient seen by provider today: Yes: Talia Bond NP.   present during visit today: Not Applicable.    Note: N/A.    Intravenous Access:  Implanted Port.    Treatment Conditions:  Lab Results   Component Value Date    HGB 11.2 (L) 08/24/2022    WBC 7.3 08/24/2022    ANEUTAUTO 5.1 08/24/2022     08/24/2022      Results reviewed, labs MET treatment parameters, ok to proceed with treatment.    Post Infusion Assessment:  Patient tolerated infusion without incident.  Blood return noted pre and post infusion.  Site patent and intact, free from redness, edema or discomfort.  No evidence of extravasations.  Access discontinued per protocol.     On pro Neulasta injector applied to left arm.  Pt given instructions and verbal teaching about the on body injector. Questions answered and pt verbalizes understanding of teaching.    Discharge Plan:   Patient discharged in stable condition accompanied by: self.  Departure Mode: Ambulatory.      Mark Rivers RN

## 2022-08-24 NOTE — PATIENT INSTRUCTIONS
Proceed with cycle 3 chemo today    3 weeks: needs to see Dr. Mars virtually BEFORE chemo (right now it's scheduled after chemo) - last cycle

## 2022-09-03 NOTE — PROGRESS NOTES
Risa is a 51 year old who is being evaluated via a billable video visit.      How would you like to obtain your AVS? MyChart  If the video visit is dropped, the invitation should be resent by: Send to e-mail at: [email or web address]  Will anyone else be joining your video visit? No     Pebbles Perez RAMIRO          Video-Visit Details    Video Start Time: 12:40 PM    Type of service:  Video Visit    Video End Time:12:52 PM    Originating Location (pt. Location): Other Clinic    Distant Location (provider location):  [facility]     Platform used for Video Visit: [facility]    Hematology/Oncology Established Patient Note      Today's Date: 9/14/22    Reason for follow-up: Right breast cancer.    HISTORY OF PRESENT ILLNESS: Risa Alcaraz is a 51 year old female who presents with the following oncologic history:  1.  4/22/2022: Mammogram showed distortion in the central right breast.  Left breast is negative.  2.  5/2/2022: Diagnostic right mammogram showed distortion in the retroareolar breast.  Targeted ultrasound of the right breast at 12:00, 2 cm from the nipple measured 1.8 x 1.3 cm.  Survey of axilla showed no evidence of adenopathy.  3.  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  4. 5/24/2022: Breast MRI showed nonmass enhancement at the 12:00 position of the RIGHT breast corresponds to biopsy-proven malignancy and measures 4.3 x 2.3 x 3.2 cm. Nodular nonmass enhancement at the 12:00 position of the LEFT breast. No evidence of adenopathy.  5. 5/25/2022: CT chest/abdomen/pelvis showed 2 mm pulmonary nodule in right upper lobe posteriorly; mildly prominent prevascular lymph node between the right brachiocephalic and  left common carotid arteries measures 8 mm, upper limits of normal; no definite evidence of metastatic disease. NM bone scan showed no bone metastases.  6. 6/1/2022: Left breast U/S showed hypoechoic area measuring 2 cm at 11:00, 5 cm from nipple corresponding to MRI finding. Biopsy of left breast lesion showed nuclear grade 1 DCIS; no evidence of invasive carcinoma or LVI, ER and NH both 100% positive. After breast tumor board discussion, consensus was for patient to proceed with surgery upfront followed by adjuvant chemotherapy.  7. 6/9/2022: BRCA gene panel negative.  8. 6/15/2022: Underwent bilateral skin sparing mastectomies with bilateral axillary sentinel lymph node excision, port placement under care of Dr. Daria Sigala and reconstruction with tissue expanders with Dr. Charles Ladd.  Pathology showed 8 x 5 mm right breast invasive mammary carcinoma with features of low-grade fibromatosis like metaplastic carcinoma and without extension to margins.  Right breast tumor was multifocal with total of 3 foci: 8 mm, 3 mm, 2 mm.  Extensive DCIS, grade 1 and 2 with involvement of sclerosing adenosis and cancerization of lobules and without extension of margins.  A single right axillary lymph node is negative for metastatic disease.  Left breast showed extensive DCIS involving extensive sclerosing adenosis without extension to margins.  No evidence of invasive carcinoma in the left breast.  A single left axillary lymph node is negative for metastatic disease.  9.  7/13/2022: Started adjuvant chemotherapy with Taxotere and Cytoxan with completion on 9/14/2022.    INTERVAL HISTORY:  Risa reports left middle toe numbness; other toes normal. No rash.    REVIEW OF SYSTEMS:   14 point ROS was reviewed and is negative other than as noted above in HPI.       HOME MEDICATIONS:  Current Outpatient Medications   Medication Sig Dispense Refill     acetaminophen (TYLENOL) 325 MG tablet Take 325-650 mg by mouth every 6 hours as  needed for mild pain       dexamethasone (DECADRON) 4 MG tablet Take 2 tablets (8 mg) by mouth 2 times daily (with meals) for 3 doses Start evening of Docetaxel infusion and continue for a total of 3 doses. (Patient not taking: Reported on 7/12/2022) 6 tablet 3     ibuprofen (ADVIL/MOTRIN) 600 MG tablet Take 600 mg by mouth every 6 hours as needed for moderate pain       multivitamin w/minerals (THERA-VIT-M) tablet Take 1 tablet by mouth daily       ondansetron (ZOFRAN ODT) 4 MG ODT tab Take 1 tablet (4 mg) by mouth every 6 hours as needed for nausea (Patient not taking: No sig reported) 10 tablet 0     ondansetron (ZOFRAN) 8 MG tablet Take 1 tablet (8 mg) by mouth every 8 hours as needed for nausea (vomiting) (Patient not taking: No sig reported) 30 tablet 2     paragard intrauterine copper device 1 each by Intrauterine route once       prochlorperazine (COMPAZINE) 10 MG tablet Take 1 tablet (10 mg) by mouth every 6 hours as needed for nausea or vomiting (Patient not taking: No sig reported) 30 tablet 2     vitamin C (ASCORBIC ACID) 1000 MG TABS Take 1,000 mg by mouth daily           ALLERGIES:  No Known Allergies      PAST MEDICAL HISTORY:  Past Medical History:   Diagnosis Date     Obese          PAST SURGICAL HISTORY:  Past Surgical History:   Procedure Laterality Date     BIOPSY NODE SENTINEL Bilateral 6/15/2022    Procedure: BILATERAL AXILLARY SENTINEL LYMPH NODE BIOPSIES;  Surgeon: Daria Sigala MD;  Location:  OR     INSERT PORT VASCULAR ACCESS N/A 6/15/2022    Procedure: PORT PLACEMENT;  Surgeon: Daria Sigala MD;  Location:  OR     MASTECTOMY SIMPLE BILATERAL Bilateral 6/15/2022    Procedure: BILATERAL SKIN SPARING MASTECTOMIES;  Surgeon: Daria Sigala MD;  Location:  OR     RECONSTRUCT BREAST, INSERT TISSUE EXPANDER BILATERAL, COMBINED Bilateral 6/15/2022    Procedure: BILATERAL BREAST RECONSTRUCTION WITH TISSUE EXPANDERS;  Surgeon: Charles Ladd MD;  Location:  OR     [facility]  ORAL SURGERY PROCEDURE      wisdom teeth, hypotension with anesthesia         SOCIAL HISTORY:  Social History     Socioeconomic History     Marital status:      Spouse name: Not on file     Number of children: Not on file     Years of education: Not on file     Highest education level: Not on file   Occupational History     Employer: [employer]   Tobacco Use     Smoking status: Former Smoker     Types: Cigarettes, Cigars     Quit date:      Years since quittin.6     Smokeless tobacco: Never Used     Tobacco comment: Quit    Vaping Use     Vaping Use: Never used   Substance and Sexual Activity     Alcohol use: Yes     Comment: occ     Drug use: No     Sexual activity: Yes     Partners: Male     Birth control/protection: I.U.D.     Comment: mirena    Other Topics Concern     Parent/sibling w/ CABG, MI or angioplasty before 65F 55M? Not Asked   Social History Narrative     Not on file     Social Determinants of Health     Financial Resource Strain: Not on file   Food Insecurity: Not on file   Transportation Needs: Not on file   Physical Activity: Not on file   Stress: Not on file   Social Connections: Not on file   Intimate Partner Violence: Not on file   Housing Stability: Not on file         FAMILY HISTORY:  Family History   Problem Relation Age of Onset     Diabetes Mother      Hypertension Mother      Cancer Father         penial     Hypertension Father      Cancer Maternal Grandmother         liver     Heart Disease Maternal Grandfather         MI     Cancer Paternal Grandmother         brain     Heart Disease Paternal Grandfather         MI         PHYSICAL EXAM:  Vital signs:  There were no vitals taken for this visit.   GENERAL: No acute distress.  EYES: No scleral icterus. No overt erythema.  RESPIRATORY: No audible cough, wheezing, or labored breathing.  MUSCULOSKELETAL: Range of motion in the neck, shoulders, and arms appear normal.  SKIN: No overt rashes, discolorations, or lesions  over the face and neck. Alopecia present.  NEUROLOGIC: Alert.  No overt tremors.  PSYCHIATRIC: Normal affect and mood.  Does not appear anxious.    LABS:  CBC RESULTS: Recent Labs   Lab Test 09/14/22  1125   WBC 8.2   RBC 3.38*   HGB 11.3*   HCT 34.6*   *   MCH 33.4*   MCHC 32.7   RDW 14.8        Last Comprehensive Metabolic Panel:  Sodium   Date Value Ref Range Status   09/14/2022 140 136 - 145 mmol/L Final     Potassium   Date Value Ref Range Status   09/14/2022 4.6 3.4 - 5.3 mmol/L Final   08/24/2022 4.0 3.4 - 5.3 mmol/L Final     Chloride   Date Value Ref Range Status   09/14/2022 106 98 - 107 mmol/L Final   08/24/2022 112 (H) 94 - 109 mmol/L Final     Carbon Dioxide (CO2)   Date Value Ref Range Status   09/14/2022 24 22 - 29 mmol/L Final   08/24/2022 24 20 - 32 mmol/L Final     Anion Gap   Date Value Ref Range Status   09/14/2022 10 7 - 15 mmol/L Final   08/24/2022 7 3 - 14 mmol/L Final     Glucose   Date Value Ref Range Status   09/14/2022 117 (H) 70 - 99 mg/dL Final   08/24/2022 132 (H) 70 - 99 mg/dL Final   01/30/2015 95 70 - 99 mg/dL Final     Comment:     Effective 7/30/2014, the reference range for this assay has changed to reflect   new instrumentation/methodology.       Urea Nitrogen   Date Value Ref Range Status   09/14/2022 15.6 6.0 - 20.0 mg/dL Final   08/24/2022 15 7 - 30 mg/dL Final     Creatinine   Date Value Ref Range Status   09/14/2022 0.71 0.51 - 0.95 mg/dL Final     GFR Estimate   Date Value Ref Range Status   09/14/2022 >90 >60 mL/min/1.73m2 Final     Comment:     Effective December 21, 2021 eGFRcr in adults is calculated using the 2021 CKD-EPI creatinine equation which includes age and gender (Luh et al., NEJ, DOI: 10.1056/PQYUpw9965226)     Calcium   Date Value Ref Range Status   09/14/2022 8.9 8.6 - 10.0 mg/dL Final     Bilirubin Total   Date Value Ref Range Status   09/14/2022 0.3 <=1.2 mg/dL Final     Alkaline Phosphatase   Date Value Ref Range Status   09/14/2022 109  (H) 35 - 104 U/L Final     ALT   Date Value Ref Range Status   09/14/2022 40 (H) 10 - 35 U/L Final     AST   Date Value Ref Range Status   09/14/2022 28 10 - 35 U/L Final       PATHOLOGY:  None new since prior visit.    IMAGING:  None new since prior visit.    ASSESSMENT/PLAN:  Risa Alcaraz is a 51 year old female with the following issues:  1.  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 Left breast DCIS  - Risa is status post bilateral mastectomies with negative surgical margins and no lymph node involvement.   -Discussed with Risa that her labs from today showed hemoglobin 11.3, WBCs 8.2, platelets 270,000.  Her blood counts have adequately recovered to proceed with cycle 4 (last cycle) Taxotere and Cytoxan today.  - Discussed that her overall prognosis is still excellent given the lower tendency for the fibromatosis low-grade type of metaplastic cancer to metastasize distantly or to the lymph nodes.  - Again discussed that surveillance following completion of chemotherapy would largely be based on history and physical exams with imaging and lab work-up reserved for any concerning signs or symptoms that could arise.  --Will have her see Dr. Sigala back for port removal.    3. Indeterminate pulmonary nodule  --Will reevaluate with 6-month interval chest CT prior to next visit 12/2022.    4.  Diffuse bone pain  - Related to Neulasta.  - Advised use of Claritin to prevent bone pain.    Return in 12/2022.    Briseyda Mars MD  Hematology/Oncology  [facility]    Total time spent: 30 minutes in patient evaluation, counseling, documentation, and coordination of care.

## 2022-09-14 ENCOUNTER — LAB (OUTPATIENT)
Dept: INFUSION THERAPY | Facility: CLINIC | Age: 52
End: 2022-09-14
Attending: INTERNAL MEDICINE
Payer: COMMERCIAL

## 2022-09-14 ENCOUNTER — VIRTUAL VISIT (OUTPATIENT)
Dept: ONCOLOGY | Facility: CLINIC | Age: 52
End: 2022-09-14
Attending: INTERNAL MEDICINE
Payer: COMMERCIAL

## 2022-09-14 VITALS
BODY MASS INDEX: 38.26 KG/M2 | TEMPERATURE: 97.6 F | SYSTOLIC BLOOD PRESSURE: 148 MMHG | WEIGHT: 216 LBS | DIASTOLIC BLOOD PRESSURE: 85 MMHG | HEART RATE: 84 BPM

## 2022-09-14 DIAGNOSIS — M89.8X9 BONE PAIN DUE TO G-CSF: ICD-10-CM

## 2022-09-14 DIAGNOSIS — Z17.1 MALIGNANT NEOPLASM OF CENTRAL PORTION OF RIGHT BREAST IN FEMALE, ESTROGEN RECEPTOR NEGATIVE (H): Primary | ICD-10-CM

## 2022-09-14 DIAGNOSIS — C50.111 MALIGNANT NEOPLASM OF CENTRAL PORTION OF RIGHT BREAST IN FEMALE, ESTROGEN RECEPTOR NEGATIVE (H): Primary | ICD-10-CM

## 2022-09-14 DIAGNOSIS — R91.8 PULMONARY NODULES: ICD-10-CM

## 2022-09-14 DIAGNOSIS — D05.12 DUCTAL CARCINOMA IN SITU (DCIS) OF LEFT BREAST: ICD-10-CM

## 2022-09-14 LAB
ALBUMIN SERPL BCG-MCNC: 4.1 G/DL (ref 3.5–5.2)
ALP SERPL-CCNC: 109 U/L (ref 35–104)
ALT SERPL W P-5'-P-CCNC: 40 U/L (ref 10–35)
ANION GAP SERPL CALCULATED.3IONS-SCNC: 10 MMOL/L (ref 7–15)
AST SERPL W P-5'-P-CCNC: 28 U/L (ref 10–35)
BASOPHILS # BLD AUTO: 0.1 10E3/UL (ref 0–0.2)
BASOPHILS NFR BLD AUTO: 1 %
BILIRUB SERPL-MCNC: 0.3 MG/DL
BUN SERPL-MCNC: 15.6 MG/DL (ref 6–20)
CALCIUM SERPL-MCNC: 8.9 MG/DL (ref 8.6–10)
CHLORIDE SERPL-SCNC: 106 MMOL/L (ref 98–107)
CREAT SERPL-MCNC: 0.71 MG/DL (ref 0.51–0.95)
DEPRECATED HCO3 PLAS-SCNC: 24 MMOL/L (ref 22–29)
EOSINOPHIL # BLD AUTO: 0.1 10E3/UL (ref 0–0.7)
EOSINOPHIL NFR BLD AUTO: 1 %
ERYTHROCYTE [DISTWIDTH] IN BLOOD BY AUTOMATED COUNT: 14.8 % (ref 10–15)
GFR SERPL CREATININE-BSD FRML MDRD: >90 ML/MIN/1.73M2
GLUCOSE SERPL-MCNC: 117 MG/DL (ref 70–99)
HCT VFR BLD AUTO: 34.6 % (ref 35–47)
HGB BLD-MCNC: 11.3 G/DL (ref 11.7–15.7)
IMM GRANULOCYTES # BLD: 0 10E3/UL
IMM GRANULOCYTES NFR BLD: 1 %
LYMPHOCYTES # BLD AUTO: 1.2 10E3/UL (ref 0.8–5.3)
LYMPHOCYTES NFR BLD AUTO: 15 %
MCH RBC QN AUTO: 33.4 PG (ref 26.5–33)
MCHC RBC AUTO-ENTMCNC: 32.7 G/DL (ref 31.5–36.5)
MCV RBC AUTO: 102 FL (ref 78–100)
MONOCYTES # BLD AUTO: 0.7 10E3/UL (ref 0–1.3)
MONOCYTES NFR BLD AUTO: 8 %
NEUTROPHILS # BLD AUTO: 6.1 10E3/UL (ref 1.6–8.3)
NEUTROPHILS NFR BLD AUTO: 74 %
NRBC # BLD AUTO: 0 10E3/UL
NRBC BLD AUTO-RTO: 0 /100
PLATELET # BLD AUTO: 270 10E3/UL (ref 150–450)
POTASSIUM SERPL-SCNC: 4.6 MMOL/L (ref 3.4–5.3)
PROT SERPL-MCNC: 6.6 G/DL (ref 6.4–8.3)
RBC # BLD AUTO: 3.38 10E6/UL (ref 3.8–5.2)
SODIUM SERPL-SCNC: 140 MMOL/L (ref 136–145)
WBC # BLD AUTO: 8.2 10E3/UL (ref 4–11)

## 2022-09-14 PROCEDURE — 96417 CHEMO IV INFUS EACH ADDL SEQ: CPT

## 2022-09-14 PROCEDURE — 96413 CHEMO IV INFUSION 1 HR: CPT

## 2022-09-14 PROCEDURE — 96375 TX/PRO/DX INJ NEW DRUG ADDON: CPT

## 2022-09-14 PROCEDURE — 99214 OFFICE O/P EST MOD 30 MIN: CPT | Mod: 95 | Performed by: INTERNAL MEDICINE

## 2022-09-14 PROCEDURE — 96372 THER/PROPH/DIAG INJ SC/IM: CPT | Performed by: INTERNAL MEDICINE

## 2022-09-14 PROCEDURE — 80053 COMPREHEN METABOLIC PANEL: CPT | Performed by: INTERNAL MEDICINE

## 2022-09-14 PROCEDURE — 258N000003 HC RX IP 258 OP 636: Performed by: INTERNAL MEDICINE

## 2022-09-14 PROCEDURE — G0463 HOSPITAL OUTPT CLINIC VISIT: HCPCS | Mod: PN,RTG,25 | Performed by: INTERNAL MEDICINE

## 2022-09-14 PROCEDURE — 250N000011 HC RX IP 250 OP 636: Performed by: INTERNAL MEDICINE

## 2022-09-14 PROCEDURE — 96377 APPLICATON ON-BODY INJECTOR: CPT | Mod: XS

## 2022-09-14 PROCEDURE — 82040 ASSAY OF SERUM ALBUMIN: CPT | Performed by: INTERNAL MEDICINE

## 2022-09-14 PROCEDURE — 85025 COMPLETE CBC W/AUTO DIFF WBC: CPT | Performed by: INTERNAL MEDICINE

## 2022-09-14 PROCEDURE — 96367 TX/PROPH/DG ADDL SEQ IV INF: CPT

## 2022-09-14 RX ORDER — HEPARIN SODIUM (PORCINE) LOCK FLUSH IV SOLN 100 UNIT/ML 100 UNIT/ML
5 SOLUTION INTRAVENOUS
Status: CANCELLED | OUTPATIENT
Start: 2022-09-14

## 2022-09-14 RX ORDER — METHYLPREDNISOLONE SODIUM SUCCINATE 125 MG/2ML
125 INJECTION, POWDER, LYOPHILIZED, FOR SOLUTION INTRAMUSCULAR; INTRAVENOUS
Status: CANCELLED
Start: 2022-09-14

## 2022-09-14 RX ORDER — ALBUTEROL SULFATE 0.83 MG/ML
2.5 SOLUTION RESPIRATORY (INHALATION)
Status: CANCELLED | OUTPATIENT
Start: 2022-09-14

## 2022-09-14 RX ORDER — HEPARIN SODIUM (PORCINE) LOCK FLUSH IV SOLN 100 UNIT/ML 100 UNIT/ML
5 SOLUTION INTRAVENOUS
Status: DISCONTINUED | OUTPATIENT
Start: 2022-09-14 | End: 2022-09-14 | Stop reason: HOSPADM

## 2022-09-14 RX ORDER — ONDANSETRON 2 MG/ML
8 INJECTION INTRAMUSCULAR; INTRAVENOUS ONCE
Status: COMPLETED | OUTPATIENT
Start: 2022-09-14 | End: 2022-09-14

## 2022-09-14 RX ORDER — ALBUTEROL SULFATE 90 UG/1
1-2 AEROSOL, METERED RESPIRATORY (INHALATION)
Status: CANCELLED
Start: 2022-09-14

## 2022-09-14 RX ORDER — LORAZEPAM 2 MG/ML
0.5 INJECTION INTRAMUSCULAR EVERY 4 HOURS PRN
Status: CANCELLED | OUTPATIENT
Start: 2022-09-14

## 2022-09-14 RX ORDER — EPINEPHRINE 1 MG/ML
0.3 INJECTION, SOLUTION INTRAMUSCULAR; SUBCUTANEOUS EVERY 5 MIN PRN
Status: CANCELLED | OUTPATIENT
Start: 2022-09-14

## 2022-09-14 RX ORDER — MEPERIDINE HYDROCHLORIDE 25 MG/ML
25 INJECTION INTRAMUSCULAR; INTRAVENOUS; SUBCUTANEOUS EVERY 30 MIN PRN
Status: CANCELLED | OUTPATIENT
Start: 2022-09-14

## 2022-09-14 RX ORDER — ONDANSETRON 2 MG/ML
8 INJECTION INTRAMUSCULAR; INTRAVENOUS ONCE
Status: CANCELLED | OUTPATIENT
Start: 2022-09-14

## 2022-09-14 RX ORDER — NALOXONE HYDROCHLORIDE 0.4 MG/ML
0.2 INJECTION, SOLUTION INTRAMUSCULAR; INTRAVENOUS; SUBCUTANEOUS
Status: CANCELLED | OUTPATIENT
Start: 2022-09-14

## 2022-09-14 RX ORDER — DIPHENHYDRAMINE HYDROCHLORIDE 50 MG/ML
50 INJECTION INTRAMUSCULAR; INTRAVENOUS
Status: CANCELLED
Start: 2022-09-14

## 2022-09-14 RX ADMIN — PEGFILGRASTIM 6 MG: KIT SUBCUTANEOUS at 15:30

## 2022-09-14 RX ADMIN — SODIUM CHLORIDE 250 ML: 9 INJECTION, SOLUTION INTRAVENOUS at 12:57

## 2022-09-14 RX ADMIN — Medication 5 ML: at 15:41

## 2022-09-14 RX ADMIN — FAMOTIDINE 20 MG: 10 INJECTION INTRAVENOUS at 13:32

## 2022-09-14 RX ADMIN — CYCLOPHOSPHAMIDE 1235 MG: 1 INJECTION, POWDER, FOR SOLUTION INTRAVENOUS; ORAL at 15:03

## 2022-09-14 RX ADMIN — ONDANSETRON 8 MG: 2 INJECTION INTRAMUSCULAR; INTRAVENOUS at 13:31

## 2022-09-14 RX ADMIN — DOCETAXEL 160 MG: 20 INJECTION, SOLUTION, CONCENTRATE INTRAVENOUS at 14:03

## 2022-09-14 RX ADMIN — DEXAMETHASONE SODIUM PHOSPHATE: 10 INJECTION, SOLUTION INTRAMUSCULAR; INTRAVENOUS at 13:37

## 2022-09-14 NOTE — LETTER
9/14/2022         RE: Risa Alcaraz  [address] [facility] [id number]        Dear Colleague,    Thank you for referring your patient, Risa Alcaraz, to the [facility]. Please see a copy of my visit note below.    Risa is a 51 year old who is being evaluated via a billable video visit.      How would you like to obtain your AVS? MyChart  If the video visit is dropped, the invitation should be resent by: Send to e-mail at: [email or web address]  Will anyone else be joining your video visit? No     Pebbles RUBIO          Video-Visit Details    Video Start Time: 12:40 PM    Type of service:  Video Visit    Video End Time:12:52 PM    Originating Location (pt. Location): Other Clinic    Distant Location (provider location):  [facility]     Platform used for Video Visit: [facility]    Hematology/Oncology Established Patient Note      Today's Date: 9/14/22    Reason for follow-up: Right breast cancer.    HISTORY OF PRESENT ILLNESS: Rsia Alcaraz is a 51 year old female who presents with the following oncologic history:  1.  4/22/2022: Mammogram showed distortion in the central right breast.  Left breast is negative.  2.  5/2/2022: Diagnostic right mammogram showed distortion in the retroareolar breast.  Targeted ultrasound of the right breast at 12:00, 2 cm from the nipple measured 1.8 x 1.3 cm.  Survey of axilla showed no evidence of adenopathy.  3.  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  4. 5/24/2022: Breast MRI showed nonmass enhancement at the 12:00 position of the RIGHT breast corresponds to biopsy-proven malignancy and measures 4.3 x 2.3 x 3.2 cm. Nodular nonmass  enhancement at the 12:00 position of the LEFT breast. No evidence of adenopathy.  5. 5/25/2022: CT chest/abdomen/pelvis showed 2 mm pulmonary nodule in right upper lobe posteriorly; mildly prominent prevascular lymph node between the right brachiocephalic and left common carotid arteries measures 8 mm, upper limits of normal; no definite evidence of metastatic disease. NM bone scan showed no bone metastases.  6. 6/1/2022: Left breast U/S showed hypoechoic area measuring 2 cm at 11:00, 5 cm from nipple corresponding to MRI finding. Biopsy of left breast lesion showed nuclear grade 1 DCIS; no evidence of invasive carcinoma or LVI, ER and UT both 100% positive. After breast tumor board discussion, consensus was for patient to proceed with surgery upfront followed by adjuvant chemotherapy.  7. 6/9/2022: BRCA gene panel negative.  8. 6/15/2022: Underwent bilateral skin sparing mastectomies with bilateral axillary sentinel lymph node excision, port placement under care of Dr. Daria Sigala and reconstruction with tissue expanders with Dr. Charles Ladd.  Pathology showed 8 x 5 mm right breast invasive mammary carcinoma with features of low-grade fibromatosis like metaplastic carcinoma and without extension to margins.  Right breast tumor was multifocal with total of 3 foci: 8 mm, 3 mm, 2 mm.  Extensive DCIS, grade 1 and 2 with involvement of sclerosing adenosis and cancerization of lobules and without extension of margins.  A single right axillary lymph node is negative for metastatic disease.  Left breast showed extensive DCIS involving extensive sclerosing adenosis without extension to margins.  No evidence of invasive carcinoma in the left breast.  A single left axillary lymph node is negative for metastatic disease.  9.  7/13/2022: Started adjuvant chemotherapy with Taxotere and Cytoxan with completion on 9/14/2022.    INTERVAL HISTORY:  Risa reports left middle toe numbness; other toes normal. No rash.    REVIEW OF  SYSTEMS:   14 point ROS was reviewed and is negative other than as noted above in HPI.       HOME MEDICATIONS:  Current Outpatient Medications   Medication Sig Dispense Refill     acetaminophen (TYLENOL) 325 MG tablet Take 325-650 mg by mouth every 6 hours as needed for mild pain       dexamethasone (DECADRON) 4 MG tablet Take 2 tablets (8 mg) by mouth 2 times daily (with meals) for 3 doses Start evening of Docetaxel infusion and continue for a total of 3 doses. (Patient not taking: Reported on 7/12/2022) 6 tablet 3     ibuprofen (ADVIL/MOTRIN) 600 MG tablet Take 600 mg by mouth every 6 hours as needed for moderate pain       multivitamin w/minerals (THERA-VIT-M) tablet Take 1 tablet by mouth daily       ondansetron (ZOFRAN ODT) 4 MG ODT tab Take 1 tablet (4 mg) by mouth every 6 hours as needed for nausea (Patient not taking: No sig reported) 10 tablet 0     ondansetron (ZOFRAN) 8 MG tablet Take 1 tablet (8 mg) by mouth every 8 hours as needed for nausea (vomiting) (Patient not taking: No sig reported) 30 tablet 2     paragard intrauterine copper device 1 each by Intrauterine route once       prochlorperazine (COMPAZINE) 10 MG tablet Take 1 tablet (10 mg) by mouth every 6 hours as needed for nausea or vomiting (Patient not taking: No sig reported) 30 tablet 2     vitamin C (ASCORBIC ACID) 1000 MG TABS Take 1,000 mg by mouth daily           ALLERGIES:  No Known Allergies      PAST MEDICAL HISTORY:  Past Medical History:   Diagnosis Date     Obese          PAST SURGICAL HISTORY:  Past Surgical History:   Procedure Laterality Date     BIOPSY NODE SENTINEL Bilateral 6/15/2022    Procedure: BILATERAL AXILLARY SENTINEL LYMPH NODE BIOPSIES;  Surgeon: Daria Sigala MD;  Location:  OR     INSERT PORT VASCULAR ACCESS N/A 6/15/2022    Procedure: PORT PLACEMENT;  Surgeon: Daria Sigala MD;  Location:  OR     MASTECTOMY SIMPLE BILATERAL Bilateral 6/15/2022    Procedure: BILATERAL SKIN SPARING MASTECTOMIES;  Surgeon:  Daria Sigala MD;  Location:  OR     RECONSTRUCT BREAST, INSERT TISSUE EXPANDER BILATERAL, COMBINED Bilateral 6/15/2022    Procedure: BILATERAL BREAST RECONSTRUCTION WITH TISSUE EXPANDERS;  Surgeon: Charles Ladd MD;  Location:  OR     [facility] ORAL SURGERY PROCEDURE      wisdom teeth, hypotension with anesthesia         SOCIAL HISTORY:  Social History     Socioeconomic History     Marital status:      Spouse name: Not on file     Number of children: Not on file     Years of education: Not on file     Highest education level: Not on file   Occupational History     Employer: [employer]   Tobacco Use     Smoking status: Former Smoker     Types: Cigarettes, Cigars     Quit date:      Years since quittin.6     Smokeless tobacco: Never Used     Tobacco comment: Quit    Vaping Use     Vaping Use: Never used   Substance and Sexual Activity     Alcohol use: Yes     Comment: occ     Drug use: No     Sexual activity: Yes     Partners: Male     Birth control/protection: I.U.D.     Comment: mirena    Other Topics Concern     Parent/sibling w/ CABG, MI or angioplasty before 65F 55M? Not Asked   Social History Narrative     Not on file     Social Determinants of Health     Financial Resource Strain: Not on file   Food Insecurity: Not on file   Transportation Needs: Not on file   Physical Activity: Not on file   Stress: Not on file   Social Connections: Not on file   Intimate Partner Violence: Not on file   Housing Stability: Not on file         FAMILY HISTORY:  Family History   Problem Relation Age of Onset     Diabetes Mother      Hypertension Mother      Cancer Father         penial     Hypertension Father      Cancer Maternal Grandmother         liver     Heart Disease Maternal Grandfather         MI     Cancer Paternal Grandmother         brain     Heart Disease Paternal Grandfather         MI         PHYSICAL EXAM:  Vital signs:  There were no vitals taken for this visit.   GENERAL: No  acute distress.  EYES: No scleral icterus. No overt erythema.  RESPIRATORY: No audible cough, wheezing, or labored breathing.  MUSCULOSKELETAL: Range of motion in the neck, shoulders, and arms appear normal.  SKIN: No overt rashes, discolorations, or lesions over the face and neck. Alopecia present.  NEUROLOGIC: Alert.  No overt tremors.  PSYCHIATRIC: Normal affect and mood.  Does not appear anxious.    LABS:  CBC RESULTS: Recent Labs   Lab Test 09/14/22  1125   WBC 8.2   RBC 3.38*   HGB 11.3*   HCT 34.6*   *   MCH 33.4*   MCHC 32.7   RDW 14.8        Last Comprehensive Metabolic Panel:  Sodium   Date Value Ref Range Status   09/14/2022 140 136 - 145 mmol/L Final     Potassium   Date Value Ref Range Status   09/14/2022 4.6 3.4 - 5.3 mmol/L Final   08/24/2022 4.0 3.4 - 5.3 mmol/L Final     Chloride   Date Value Ref Range Status   09/14/2022 106 98 - 107 mmol/L Final   08/24/2022 112 (H) 94 - 109 mmol/L Final     Carbon Dioxide (CO2)   Date Value Ref Range Status   09/14/2022 24 22 - 29 mmol/L Final   08/24/2022 24 20 - 32 mmol/L Final     Anion Gap   Date Value Ref Range Status   09/14/2022 10 7 - 15 mmol/L Final   08/24/2022 7 3 - 14 mmol/L Final     Glucose   Date Value Ref Range Status   09/14/2022 117 (H) 70 - 99 mg/dL Final   08/24/2022 132 (H) 70 - 99 mg/dL Final   01/30/2015 95 70 - 99 mg/dL Final     Comment:     Effective 7/30/2014, the reference range for this assay has changed to reflect   new instrumentation/methodology.       Urea Nitrogen   Date Value Ref Range Status   09/14/2022 15.6 6.0 - 20.0 mg/dL Final   08/24/2022 15 7 - 30 mg/dL Final     Creatinine   Date Value Ref Range Status   09/14/2022 0.71 0.51 - 0.95 mg/dL Final     GFR Estimate   Date Value Ref Range Status   09/14/2022 >90 >60 mL/min/1.73m2 Final     Comment:     Effective December 21, 2021 eGFRcr in adults is calculated using the 2021 CKD-EPI creatinine equation which includes age and gender (Luh et al., NEJM, DOI:  10.1056/ZHDNyu2314618)     Calcium   Date Value Ref Range Status   09/14/2022 8.9 8.6 - 10.0 mg/dL Final     Bilirubin Total   Date Value Ref Range Status   09/14/2022 0.3 <=1.2 mg/dL Final     Alkaline Phosphatase   Date Value Ref Range Status   09/14/2022 109 (H) 35 - 104 U/L Final     ALT   Date Value Ref Range Status   09/14/2022 40 (H) 10 - 35 U/L Final     AST   Date Value Ref Range Status   09/14/2022 28 10 - 35 U/L Final       PATHOLOGY:  None new since prior visit.    IMAGING:  None new since prior visit.    ASSESSMENT/PLAN:  Risa Alcaraz is a 51 year old female with the following issues:  1.  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 Left breast DCIS  - Risa is status post bilateral mastectomies with negative surgical margins and no lymph node involvement.   -Discussed with Risa that her labs from today showed hemoglobin 11.3, WBCs 8.2, platelets 270,000.  Her blood counts have adequately recovered to proceed with cycle 4 (last cycle) Taxotere and Cytoxan today.  - Discussed that her overall prognosis is still excellent given the lower tendency for the fibromatosis low-grade type of metaplastic cancer to metastasize distantly or to the lymph nodes.  - Again discussed that surveillance following completion of chemotherapy would largely be based on history and physical exams with imaging and lab work-up reserved for any concerning signs or symptoms that could arise.  --Will have her see Dr. Sigala back for port removal.    3. Indeterminate pulmonary nodule  --Will reevaluate with 6-month interval chest CT prior to next visit 12/2022.    4.  Diffuse bone pain  - Related to Neulasta.  - Advised use of Claritin to prevent bone pain.    Return in 12/2022.    Briseyda Mars MD  Hematology/Oncology  [facility]    Total time spent: 30 minutes  in patient evaluation, counseling, documentation, and coordination of care.       Again, thank you for allowing me to participate in the care of your patient.        Sincerely,        Briseyda Mars MD

## 2022-09-14 NOTE — PROGRESS NOTES
Infusion Nursing Note:  Risa Alcaraz presents today for Cytoxan, Taxotere & Neulasta OnPro.    Patient seen by provider today: Yes: Dr. Lowe   present during visit today: Not Applicable.    Note: N/A.    Intravenous Access:  Implanted Port.    Treatment Conditions:  Lab Results   Component Value Date    HGB 11.3 (L) 09/14/2022    WBC 8.2 09/14/2022    ANEUTAUTO 6.1 09/14/2022     09/14/2022      Results reviewed, labs MET treatment parameters, ok to proceed with treatment.    Post Infusion Assessment:  Patient tolerated infusion without incident.  Blood return noted pre and post infusion.  Site patent and intact, free from redness, edema or discomfort.  No evidence of extravasations.  Access discontinued per protocol.     On pro Neulasta injector applied to left arm.  Pt given instructions and verbal teaching about the on body injector. Questions answered and pt verbalizes understanding of teaching.    Discharge Plan:   Patient discharged in stable condition accompanied by: self.  Departure Mode: Ambulatory.      Mark Rivers RN

## 2022-09-14 NOTE — LETTER
9/14/2022         RE: Risa Alcaraz  [address] [facility] [address]        Dear Colleague,    Thank you for referring your patient, Risa Alcaraz, to the [facility]. Please see a copy of my visit note below.    Risa is a 51 year old who is being evaluated via a billable video visit.      How would you like to obtain your AVS? MyChart  If the video visit is dropped, the invitation should be resent by: Send to e-mail at: [email or web address]  Will anyone else be joining your video visit? No     Pebbles RUBIO          Video-Visit Details    Video Start Time: 12:40 PM    Type of service:  Video Visit    Video End Time:12:52 PM    Originating Location (pt. Location): Other Clinic    Distant Location (provider location):  [facility]     Platform used for Video Visit: [facility]    Hematology/Oncology Established Patient Note      Today's Date: 9/14/22    Reason for follow-up: Right breast cancer.    HISTORY OF PRESENT ILLNESS: Risa Alcaraz is a 51 year old female who presents with the following oncologic history:  1.  4/22/2022: Mammogram showed distortion in the central right breast.  Left breast is negative.  2.  5/2/2022: Diagnostic right mammogram showed distortion in the retroareolar breast.  Targeted ultrasound of the right breast at 12:00, 2 cm from the nipple measured 1.8 x 1.3 cm.  Survey of axilla showed no evidence of adenopathy.  3.  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  4. 5/24/2022: Breast MRI showed nonmass enhancement at the 12:00 position of the RIGHT breast corresponds to biopsy-proven malignancy and measures 4.3 x 2.3 x 3.2 cm. Nodular nonmass  enhancement at the 12:00 position of the LEFT breast. No evidence of adenopathy.  5. 5/25/2022: CT chest/abdomen/pelvis showed 2 mm pulmonary nodule in right upper lobe posteriorly; mildly prominent prevascular lymph node between the right brachiocephalic and left common carotid arteries measures 8 mm, upper limits of normal; no definite evidence of metastatic disease. NM bone scan showed no bone metastases.  6. 6/1/2022: Left breast U/S showed hypoechoic area measuring 2 cm at 11:00, 5 cm from nipple corresponding to MRI finding. Biopsy of left breast lesion showed nuclear grade 1 DCIS; no evidence of invasive carcinoma or LVI, ER and MD both 100% positive. After breast tumor board discussion, consensus was for patient to proceed with surgery upfront followed by adjuvant chemotherapy.  7. 6/9/2022: BRCA gene panel negative.  8. 6/15/2022: Underwent bilateral skin sparing mastectomies with bilateral axillary sentinel lymph node excision, port placement under care of Dr. Daria Sigala and reconstruction with tissue expanders with Dr. Charles Ladd.  Pathology showed 8 x 5 mm right breast invasive mammary carcinoma with features of low-grade fibromatosis like metaplastic carcinoma and without extension to margins.  Right breast tumor was multifocal with total of 3 foci: 8 mm, 3 mm, 2 mm.  Extensive DCIS, grade 1 and 2 with involvement of sclerosing adenosis and cancerization of lobules and without extension of margins.  A single right axillary lymph node is negative for metastatic disease.  Left breast showed extensive DCIS involving extensive sclerosing adenosis without extension to margins.  No evidence of invasive carcinoma in the left breast.  A single left axillary lymph node is negative for metastatic disease.  9.  7/13/2022: Started adjuvant chemotherapy with Taxotere and Cytoxan with completion on 9/14/2022.    INTERVAL HISTORY:  Risa reports left middle toe numbness; other toes normal. No rash.    REVIEW OF  SYSTEMS:   14 point ROS was reviewed and is negative other than as noted above in HPI.       HOME MEDICATIONS:  Current Outpatient Medications   Medication Sig Dispense Refill     acetaminophen (TYLENOL) 325 MG tablet Take 325-650 mg by mouth every 6 hours as needed for mild pain       dexamethasone (DECADRON) 4 MG tablet Take 2 tablets (8 mg) by mouth 2 times daily (with meals) for 3 doses Start evening of Docetaxel infusion and continue for a total of 3 doses. (Patient not taking: Reported on 7/12/2022) 6 tablet 3     ibuprofen (ADVIL/MOTRIN) 600 MG tablet Take 600 mg by mouth every 6 hours as needed for moderate pain       multivitamin w/minerals (THERA-VIT-M) tablet Take 1 tablet by mouth daily       ondansetron (ZOFRAN ODT) 4 MG ODT tab Take 1 tablet (4 mg) by mouth every 6 hours as needed for nausea (Patient not taking: No sig reported) 10 tablet 0     ondansetron (ZOFRAN) 8 MG tablet Take 1 tablet (8 mg) by mouth every 8 hours as needed for nausea (vomiting) (Patient not taking: No sig reported) 30 tablet 2     paragard intrauterine copper device 1 each by Intrauterine route once       prochlorperazine (COMPAZINE) 10 MG tablet Take 1 tablet (10 mg) by mouth every 6 hours as needed for nausea or vomiting (Patient not taking: No sig reported) 30 tablet 2     vitamin C (ASCORBIC ACID) 1000 MG TABS Take 1,000 mg by mouth daily           ALLERGIES:  No Known Allergies      PAST MEDICAL HISTORY:  Past Medical History:   Diagnosis Date     Obese          PAST SURGICAL HISTORY:  Past Surgical History:   Procedure Laterality Date     BIOPSY NODE SENTINEL Bilateral 6/15/2022    Procedure: BILATERAL AXILLARY SENTINEL LYMPH NODE BIOPSIES;  Surgeon: Daria Sigala MD;  Location:  OR     INSERT PORT VASCULAR ACCESS N/A 6/15/2022    Procedure: PORT PLACEMENT;  Surgeon: Daria Sigala MD;  Location:  OR     MASTECTOMY SIMPLE BILATERAL Bilateral 6/15/2022    Procedure: BILATERAL SKIN SPARING MASTECTOMIES;  Surgeon:  Daria Sigala MD;  Location:  OR     RECONSTRUCT BREAST, INSERT TISSUE EXPANDER BILATERAL, COMBINED Bilateral 6/15/2022    Procedure: BILATERAL BREAST RECONSTRUCTION WITH TISSUE EXPANDERS;  Surgeon: Charles Ladd MD;  Location:  OR     [facility] ORAL SURGERY PROCEDURE      wisdom teeth, hypotension with anesthesia         SOCIAL HISTORY:  Social History     Socioeconomic History     Marital status:      Spouse name: Not on file     Number of children: Not on file     Years of education: Not on file     Highest education level: Not on file   Occupational History     Employer: [employer]   Tobacco Use     Smoking status: Former Smoker     Types: Cigarettes, Cigars     Quit date:      Years since quittin.6     Smokeless tobacco: Never Used     Tobacco comment: Quit    Vaping Use     Vaping Use: Never used   Substance and Sexual Activity     Alcohol use: Yes     Comment: occ     Drug use: No     Sexual activity: Yes     Partners: Male     Birth control/protection: I.U.D.     Comment: mirena    Other Topics Concern     Parent/sibling w/ CABG, MI or angioplasty before 65F 55M? Not Asked   Social History Narrative     Not on file     Social Determinants of Health     Financial Resource Strain: Not on file   Food Insecurity: Not on file   Transportation Needs: Not on file   Physical Activity: Not on file   Stress: Not on file   Social Connections: Not on file   Intimate Partner Violence: Not on file   Housing Stability: Not on file         FAMILY HISTORY:  Family History   Problem Relation Age of Onset     Diabetes Mother      Hypertension Mother      Cancer Father         penial     Hypertension Father      Cancer Maternal Grandmother         liver     Heart Disease Maternal Grandfather         MI     Cancer Paternal Grandmother         brain     Heart Disease Paternal Grandfather         MI         PHYSICAL EXAM:  Vital signs:  There were no vitals taken for this visit.   GENERAL: No  acute distress.  EYES: No scleral icterus. No overt erythema.  RESPIRATORY: No audible cough, wheezing, or labored breathing.  MUSCULOSKELETAL: Range of motion in the neck, shoulders, and arms appear normal.  SKIN: No overt rashes, discolorations, or lesions over the face and neck. Alopecia present.  NEUROLOGIC: Alert.  No overt tremors.  PSYCHIATRIC: Normal affect and mood.  Does not appear anxious.    LABS:  CBC RESULTS: Recent Labs   Lab Test 09/14/22  1125   WBC 8.2   RBC 3.38*   HGB 11.3*   HCT 34.6*   *   MCH 33.4*   MCHC 32.7   RDW 14.8        Last Comprehensive Metabolic Panel:  Sodium   Date Value Ref Range Status   09/14/2022 140 136 - 145 mmol/L Final     Potassium   Date Value Ref Range Status   09/14/2022 4.6 3.4 - 5.3 mmol/L Final   08/24/2022 4.0 3.4 - 5.3 mmol/L Final     Chloride   Date Value Ref Range Status   09/14/2022 106 98 - 107 mmol/L Final   08/24/2022 112 (H) 94 - 109 mmol/L Final     Carbon Dioxide (CO2)   Date Value Ref Range Status   09/14/2022 24 22 - 29 mmol/L Final   08/24/2022 24 20 - 32 mmol/L Final     Anion Gap   Date Value Ref Range Status   09/14/2022 10 7 - 15 mmol/L Final   08/24/2022 7 3 - 14 mmol/L Final     Glucose   Date Value Ref Range Status   09/14/2022 117 (H) 70 - 99 mg/dL Final   08/24/2022 132 (H) 70 - 99 mg/dL Final   01/30/2015 95 70 - 99 mg/dL Final     Comment:     Effective 7/30/2014, the reference range for this assay has changed to reflect   new instrumentation/methodology.       Urea Nitrogen   Date Value Ref Range Status   09/14/2022 15.6 6.0 - 20.0 mg/dL Final   08/24/2022 15 7 - 30 mg/dL Final     Creatinine   Date Value Ref Range Status   09/14/2022 0.71 0.51 - 0.95 mg/dL Final     GFR Estimate   Date Value Ref Range Status   09/14/2022 >90 >60 mL/min/1.73m2 Final     Comment:     Effective December 21, 2021 eGFRcr in adults is calculated using the 2021 CKD-EPI creatinine equation which includes age and gender (Luh et al., NEJM, DOI:  10.1056/GYOVvw8880165)     Calcium   Date Value Ref Range Status   09/14/2022 8.9 8.6 - 10.0 mg/dL Final     Bilirubin Total   Date Value Ref Range Status   09/14/2022 0.3 <=1.2 mg/dL Final     Alkaline Phosphatase   Date Value Ref Range Status   09/14/2022 109 (H) 35 - 104 U/L Final     ALT   Date Value Ref Range Status   09/14/2022 40 (H) 10 - 35 U/L Final     AST   Date Value Ref Range Status   09/14/2022 28 10 - 35 U/L Final       PATHOLOGY:  None new since prior visit.    IMAGING:  None new since prior visit.    ASSESSMENT/PLAN:  Risa Alcaraz is a 51 year old female with the following issues:  1.  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 Left breast DCIS  - Risa is status post bilateral mastectomies with negative surgical margins and no lymph node involvement.   -Discussed with Risa that her labs from today showed hemoglobin 11.3, WBCs 8.2, platelets 270,000.  Her blood counts have adequately recovered to proceed with cycle 4 (last cycle) Taxotere and Cytoxan today.  - Discussed that her overall prognosis is still excellent given the lower tendency for the fibromatosis low-grade type of metaplastic cancer to metastasize distantly or to the lymph nodes.  - Again discussed that surveillance following completion of chemotherapy would largely be based on history and physical exams with imaging and lab work-up reserved for any concerning signs or symptoms that could arise.  --Will have her see Dr. Sigala back for port removal.    3. Indeterminate pulmonary nodule  --Will reevaluate with 6-month interval chest CT prior to next visit 12/2022.    4.  Diffuse bone pain  - Related to Neulasta.  - Advised use of Claritin to prevent bone pain.    Return in 12/2022.    Briseyda Mars MD  Hematology/Oncology  [facility]    Total time spent: 30 minutes  in patient evaluation, counseling, documentation, and coordination of care.       Again, thank you for allowing me to participate in the care of your patient.        Sincerely,        Briseyda Mars MD

## 2022-09-14 NOTE — PATIENT INSTRUCTIONS
Arrange for referral back to Dr. Daria Sigala for port removal.  2.   RTC MD in 3 months with chest CT scan and labs prior to visit.

## 2022-09-15 ENCOUNTER — TELEPHONE (OUTPATIENT)
Dept: SURGERY | Facility: CLINIC | Age: 52
End: 2022-09-15

## 2022-09-15 ENCOUNTER — PREP FOR PROCEDURE (OUTPATIENT)
Dept: SURGERY | Facility: CLINIC | Age: 52
End: 2022-09-15

## 2022-09-15 DIAGNOSIS — C50.911 BILATERAL MALIGNANT NEOPLASM OF BREAST IN FEMALE, UNSPECIFIED ESTROGEN RECEPTOR STATUS, UNSPECIFIED SITE OF BREAST (H): Primary | ICD-10-CM

## 2022-09-15 DIAGNOSIS — C50.912 BILATERAL MALIGNANT NEOPLASM OF BREAST IN FEMALE, UNSPECIFIED ESTROGEN RECEPTOR STATUS, UNSPECIFIED SITE OF BREAST (H): Primary | ICD-10-CM

## 2022-09-15 NOTE — TELEPHONE ENCOUNTER
Type of surgery: port removal  Location of surgery: [facility]  Date and time of surgery: 10/17/22 7:30am  Surgeon: Dr Sigala  Pre-Op Appt Date: pt to schedule  Post-Op Appt Date: pt to schedule   Packet sent out: Yes  Pre-cert/Authorization completed:  Not Applicable  Date: 9/15/22

## 2022-10-13 ENCOUNTER — TELEPHONE (OUTPATIENT)
Dept: ONCOLOGY | Facility: CLINIC | Age: 52
End: 2022-10-13

## 2022-10-13 NOTE — TELEPHONE ENCOUNTER
Patient reports that she will be having her port removed and had questions regarding labs that were ordered prior to her December visit with Dr. Mars. Writer explained to the patient the CMP and CBC that have been ordered. Patient prefers to have them drawn at a local clinic. Writer encouraged her to call her [place] PCP office to schedule a lab appointment the week of December 5th for the orders from Dr. Mars that are in the system. Patient verbalized understanding.    Lisa Mcfarland RN on 10/13/2022 at 9:57 AM

## 2022-10-14 ENCOUNTER — OFFICE VISIT (OUTPATIENT)
Dept: FAMILY MEDICINE | Facility: CLINIC | Age: 52
End: 2022-10-14
Payer: COMMERCIAL

## 2022-10-14 VITALS
OXYGEN SATURATION: 99 % | BODY MASS INDEX: 38.26 KG/M2 | TEMPERATURE: 98.2 F | DIASTOLIC BLOOD PRESSURE: 72 MMHG | HEART RATE: 91 BPM | WEIGHT: 216 LBS | SYSTOLIC BLOOD PRESSURE: 128 MMHG

## 2022-10-14 DIAGNOSIS — Z01.818 PRE-OP EVALUATION: Primary | ICD-10-CM

## 2022-10-14 DIAGNOSIS — N95.1 SYMPTOMATIC MENOPAUSAL OR FEMALE CLIMACTERIC STATES: ICD-10-CM

## 2022-10-14 DIAGNOSIS — Z12.11 SCREEN FOR COLON CANCER: ICD-10-CM

## 2022-10-14 LAB
ANION GAP SERPL CALCULATED.3IONS-SCNC: 13 MMOL/L (ref 7–15)
BASOPHILS # BLD AUTO: 0.1 10E3/UL (ref 0–0.2)
BASOPHILS NFR BLD AUTO: 1 %
BUN SERPL-MCNC: 13.8 MG/DL (ref 6–20)
CALCIUM SERPL-MCNC: 9.6 MG/DL (ref 8.6–10)
CHLORIDE SERPL-SCNC: 103 MMOL/L (ref 98–107)
CREAT SERPL-MCNC: 0.8 MG/DL (ref 0.51–0.95)
DEPRECATED HCO3 PLAS-SCNC: 24 MMOL/L (ref 22–29)
EOSINOPHIL # BLD AUTO: 0.3 10E3/UL (ref 0–0.7)
EOSINOPHIL NFR BLD AUTO: 4 %
ERYTHROCYTE [DISTWIDTH] IN BLOOD BY AUTOMATED COUNT: 14.9 % (ref 10–15)
GFR SERPL CREATININE-BSD FRML MDRD: 90 ML/MIN/1.73M2
GLUCOSE SERPL-MCNC: 108 MG/DL (ref 70–99)
HCG UR QL: NEGATIVE
HCT VFR BLD AUTO: 37.2 % (ref 35–47)
HGB BLD-MCNC: 12.2 G/DL (ref 11.7–15.7)
IMM GRANULOCYTES # BLD: 0 10E3/UL
IMM GRANULOCYTES NFR BLD: 0 %
LYMPHOCYTES # BLD AUTO: 1.1 10E3/UL (ref 0.8–5.3)
LYMPHOCYTES NFR BLD AUTO: 18 %
MCH RBC QN AUTO: 34.3 PG (ref 26.5–33)
MCHC RBC AUTO-ENTMCNC: 32.8 G/DL (ref 31.5–36.5)
MCV RBC AUTO: 105 FL (ref 78–100)
MONOCYTES # BLD AUTO: 0.5 10E3/UL (ref 0–1.3)
MONOCYTES NFR BLD AUTO: 8 %
NEUTROPHILS # BLD AUTO: 4.3 10E3/UL (ref 1.6–8.3)
NEUTROPHILS NFR BLD AUTO: 69 %
PLATELET # BLD AUTO: 292 10E3/UL (ref 150–450)
POTASSIUM SERPL-SCNC: 4.6 MMOL/L (ref 3.4–5.3)
RBC # BLD AUTO: 3.56 10E6/UL (ref 3.8–5.2)
SODIUM SERPL-SCNC: 140 MMOL/L (ref 136–145)
WBC # BLD AUTO: 6.3 10E3/UL (ref 4–11)

## 2022-10-14 PROCEDURE — 36415 COLL VENOUS BLD VENIPUNCTURE: CPT | Performed by: NURSE PRACTITIONER

## 2022-10-14 PROCEDURE — 80048 BASIC METABOLIC PNL TOTAL CA: CPT | Performed by: NURSE PRACTITIONER

## 2022-10-14 PROCEDURE — 99214 OFFICE O/P EST MOD 30 MIN: CPT | Performed by: NURSE PRACTITIONER

## 2022-10-14 PROCEDURE — 85025 COMPLETE CBC W/AUTO DIFF WBC: CPT | Performed by: NURSE PRACTITIONER

## 2022-10-14 PROCEDURE — 81025 URINE PREGNANCY TEST: CPT | Performed by: NURSE PRACTITIONER

## 2022-10-14 NOTE — H&P (VIEW-ONLY)
[facility]  [address] [place] [address]  Phone: [phone]  Fax: [phone]  Primary Provider: [facility]  Pre-op Performing Provider: TRAVIS URENA      PREOPERATIVE EVALUATION:  Today's date: 10/14/2022    Risa Alcaraz is a 51 year old female who presents for a preoperative evaluation.    Surgical Information:  Surgery/Procedure: Port removal   Surgery Location: [facility]   Surgeon: Jovon  Surgery Date: 10/17/2022  Time of Surgery: 730   Where patient plans to recover: At home with family  Fax number for surgical facility: Note does not need to be faxed, will be available electronically in Epic.    Type of Anesthesia Anticipated: Choice    Assessment & Plan     The proposed surgical procedure is considered LOW risk.    Pre-op evaluation  Port removal left anterior chest wall.   Labs done today and pending.   - CBC with platelets and differential  - Basic metabolic panel  (Ca, Cl, CO2, Creat, Gluc, K, Na, BUN)  - HCG Qual, Urine ([id number])    Symptomatic menopausal or female climacteric states  Begin new medication  HORMONAL REPLACEMENT THERAPY not recommended.   - FLUoxetine (PROZAC) 20 MG capsule  Dispense: 90 capsule; Refill: 1    Screen for colon cancer  Due in near future.            Risks and Recommendations:  The patient has the following additional risks and recommendations for perioperative complications:   - No identified additional risk factors other than previously addressed    Medication Instructions:  Patient is to take all scheduled medications on the day of surgery   - ibuprofen (Advil, Motrin): HOLD 1 day before surgery.     RECOMMENDATION:  APPROVAL GIVEN to proceed with proposed procedure, without further diagnostic evaluation.      Call or return to the clinic with any worsening of symptoms or no resolution. Patient/Parent verbalized understanding and is in agreement. Medication side effects reviewed.   Current  Outpatient Medications   Medication Sig Dispense Refill     acetaminophen (TYLENOL) 325 MG tablet Take 325-650 mg by mouth every 6 hours as needed for mild pain       FLUoxetine (PROZAC) 20 MG capsule Take 1 capsule (20 mg) by mouth daily 90 capsule 1     multivitamin w/minerals (THERA-VIT-M) tablet Take 1 tablet by mouth daily       paragard intrauterine copper device 1 each by Intrauterine route once       vitamin C (ASCORBIC ACID) 1000 MG TABS Take 1,000 mg by mouth daily       Chart documentation with Dragon Voice recognition Software. Although reviewed after completion, some words and grammatical errors may remain.  Janet Yap MSN,FNP-[facility]  [id number] [place] [id number] [phone]            Subjective     HPI related to upcoming procedure: port removal planned for 10/17/2022  Chemo finished 9/14/2022   Next scan is November December 5th next exam   No periods since July- IUD in place    Preop Questions 10/14/2022   1. Have you ever had a heart attack or stroke? No   2. Have you ever had surgery on your heart or blood vessels, such as a stent placement, a coronary artery bypass, or surgery on an artery in your head, neck, heart, or legs? No   3. Do you have chest pain with activity? No   4. Do you have a history of  heart failure? No   5. Do you currently have a cold, bronchitis or symptoms of other infection? No   6. Do you have a cough, shortness of breath, or wheezing? No   7. Do you or anyone in your family have previous history of blood clots? No   8. Do you or does anyone in your family have a serious bleeding problem such as prolonged bleeding following surgeries or cuts? No   9. Have you ever had problems with anemia or been told to take iron pills? No   10. Have you had any abnormal blood loss such as black, tarry or bloody stools, or abnormal vaginal bleeding? No   11. Have you ever had a blood transfusion? No   12. Are you willing to have a  blood transfusion if it is medically needed before, during, or after your surgery? Yes   13. Have you or any of your relatives ever had problems with anesthesia? No   14. Do you have sleep apnea, excessive snoring or daytime drowsiness? No   15. Do you have any artifical heart valves or other implanted medical devices like a pacemaker, defibrillator, or continuous glucose monitor? No   16. Do you have artificial joints? No   17. Are you allergic to latex? No   18. Is there any chance that you may be pregnant? No       Health Care Directive:  Patient does not have a Health Care Directive or Living Will:     Preoperative Review of :   reviewed - no record of controlled substances prescribed.        Review of Systems  Constitutional, neuro, ENT, endocrine, pulmonary, cardiac, gastrointestinal, genitourinary, musculoskeletal, integument and psychiatric systems are negative, except as otherwise noted.    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 Provider to review        Past Medical History:   Diagnosis Date     Obese      Past Surgical History:   Procedure Laterality Date     BIOPSY NODE SENTINEL Bilateral 6/15/2022    Procedure: BILATERAL AXILLARY SENTINEL LYMPH NODE BIOPSIES;  Surgeon: Daria Sigala MD;  Location: [facility] OR     INSERT PORT VASCULAR ACCESS N/A 6/15/2022    Procedure: PORT  PLACEMENT;  Surgeon: Daria Sigala MD;  Location: [facility] OR     MASTECTOMY SIMPLE BILATERAL Bilateral 6/15/2022    Procedure: BILATERAL SKIN SPARING MASTECTOMIES;  Surgeon: Daria Sigala MD;  Location: [facility] OR     RECONSTRUCT BREAST, INSERT TISSUE EXPANDER BILATERAL, COMBINED Bilateral 6/15/2022    Procedure: BILATERAL BREAST RECONSTRUCTION WITH TISSUE EXPANDERS;  Surgeon: Charles Ladd MD;  Location:  OR     [facility] ORAL SURGERY PROCEDURE      wisdom teeth, hypotension with anesthesia     Current Outpatient Medications   Medication Sig Dispense Refill     acetaminophen (TYLENOL) 325 MG tablet Take 325-650 mg by mouth every 6 hours as needed for mild pain       ibuprofen (ADVIL/MOTRIN) 600 MG tablet Take 600 mg by mouth every 6 hours as needed for moderate pain       multivitamin w/minerals (THERA-VIT-M) tablet Take 1 tablet by mouth daily       paragard intrauterine copper device 1 each by Intrauterine route once       vitamin C (ASCORBIC ACID) 1000 MG TABS Take 1,000 mg by mouth daily         No Known Allergies     Social History     Tobacco Use     Smoking status: Former     Types: Cigarettes, Cigars     Quit date:      Years since quittin.     Smokeless tobacco: Never     Tobacco comments:     Quit    Substance Use Topics     Alcohol use: Yes     Comment: occ       History   Drug Use No         Objective     /72   Pulse 91   Temp 98.2  F (36.8  C) (Tympanic)   Wt 98 kg (216 lb)   SpO2 99%   BMI 38.26 kg/m      Physical Exam    GENERAL APPEARANCE: healthy, alert and no distress     EYES: EOMI, PERRL     HENT: ear canals and TM's normal and nose and mouth without ulcers or lesions     NECK: no adenopathy, no asymmetry, masses, or scars and thyroid normal to palpation     RESP: lungs clear to auscultation - no rales, rhonchi or wheezes     CV: regular rates and rhythm, normal S1 S2, no S3 or S4 and no murmur, click or rub     ABDOMEN:  soft, nontender, no HSM or masses and bowel  sounds normal     MS: extremities normal- no gross deformities noted, no evidence of inflammation in joints, FROM in all extremities.     SKIN: no suspicious lesions or rashes     Acquired alopecia,  port left anterior chest wall.      NEURO: Normal strength and tone, sensory exam grossly normal, mentation intact and speech normal     PSYCH: mentation appears normal. and affect normal/bright     LYMPHATICS: No cervical adenopathy    Recent Labs   Lab Test 09/14/22  1125 08/24/22  0917   HGB 11.3* 11.2*    264    143   POTASSIUM 4.6 4.0   CR 0.71 1.02        Diagnostics:  Labs ok for surgery   No EKG required, no history of coronary heart disease, significant arrhythmia, peripheral arterial disease or other structural heart disease.    Results for orders placed or performed in visit on 10/14/22   HCG Qual, Urine ([id number])     Status: Normal   Result Value Ref Range    hCG Urine Qualitative Negative Negative   Basic metabolic panel  (Ca, Cl, CO2, Creat, Gluc, K, Na, BUN)     Status: Abnormal   Result Value Ref Range    Sodium 140 136 - 145 mmol/L    Potassium 4.6 3.4 - 5.3 mmol/L    Chloride 103 98 - 107 mmol/L    Carbon Dioxide (CO2) 24 22 - 29 mmol/L    Anion Gap 13 7 - 15 mmol/L    Urea Nitrogen 13.8 6.0 - 20.0 mg/dL    Creatinine 0.80 0.51 - 0.95 mg/dL    Calcium 9.6 8.6 - 10.0 mg/dL    Glucose 108 (H) 70 - 99 mg/dL    GFR Estimate 90 >60 mL/min/1.73m2   CBC with platelets and differential     Status: Abnormal   Result Value Ref Range    WBC Count 6.3 4.0 - 11.0 10e3/uL    RBC Count 3.56 (L) 3.80 - 5.20 10e6/uL    Hemoglobin 12.2 11.7 - 15.7 g/dL    Hematocrit 37.2 35.0 - 47.0 %     (H) 78 - 100 fL    MCH 34.3 (H) 26.5 - 33.0 pg    MCHC 32.8 31.5 - 36.5 g/dL    RDW 14.9 10.0 - 15.0 %    Platelet Count 292 150 - 450 10e3/uL    % Neutrophils 69 %    % Lymphocytes 18 %    % Monocytes 8 %    % Eosinophils 4 %    % Basophils 1 %    % Immature Granulocytes 0 %    Absolute Neutrophils 4.3 1.6 - 8.3  10e3/uL    Absolute Lymphocytes 1.1 0.8 - 5.3 10e3/uL    Absolute Monocytes 0.5 0.0 - 1.3 10e3/uL    Absolute Eosinophils 0.3 0.0 - 0.7 10e3/uL    Absolute Basophils 0.1 0.0 - 0.2 10e3/uL    Absolute Immature Granulocytes 0.0 <=0.4 10e3/uL   CBC with platelets and differential     Status: Abnormal    Narrative    The following orders were created for panel order CBC with platelets and differential.  Procedure                               Abnormality         Status                     ---------                               -----------         ------                     CBC with platelets and d...[[id number]]  Abnormal            Final result                 Please view results for these tests on the individual orders.       Signed Electronically by: JIMBO Diaz CNP  Copy of this evaluation report is provided to requesting physician.

## 2022-10-14 NOTE — PROGRESS NOTES
[facility]  [address] [place] [id number]  Phone: [phone]  Fax: [phone]  Primary Provider: [facility]  Pre-op Performing Provider: TRAVIS URENA      PREOPERATIVE EVALUATION:  Today's date: 10/14/2022    Risa Alcaraz is a 51 year old female who presents for a preoperative evaluation.    Surgical Information:  Surgery/Procedure: Port removal   Surgery Location: [place] [facility]   Surgeon: Jovon  Surgery Date: 10/17/2022  Time of Surgery: 730   Where patient plans to recover: At home with family  Fax number for surgical facility: Note does not need to be faxed, will be available electronically in Epic.    Type of Anesthesia Anticipated: Choice    Assessment & Plan     The proposed surgical procedure is considered LOW risk.    Pre-op evaluation  Port removal left anterior chest wall.   Labs done today and pending.   - CBC with platelets and differential  - Basic metabolic panel  (Ca, Cl, CO2, Creat, Gluc, K, Na, BUN)  - HCG Qual, Urine ([id number])    Symptomatic menopausal or female climacteric states  Begin new medication  HORMONAL REPLACEMENT THERAPY not recommended.   - FLUoxetine (PROZAC) 20 MG capsule  Dispense: 90 capsule; Refill: 1    Screen for colon cancer  Due in near future.            Risks and Recommendations:  The patient has the following additional risks and recommendations for perioperative complications:   - No identified additional risk factors other than previously addressed    Medication Instructions:  Patient is to take all scheduled medications on the day of surgery   - ibuprofen (Advil, Motrin): HOLD 1 day before surgery.     RECOMMENDATION:  APPROVAL GIVEN to proceed with proposed procedure, without further diagnostic evaluation.      Call or return to the clinic with any worsening of symptoms or no resolution. Patient/Parent verbalized understanding and is in agreement. Medication side effects reviewed.   Current  Outpatient Medications   Medication Sig Dispense Refill     acetaminophen (TYLENOL) 325 MG tablet Take 325-650 mg by mouth every 6 hours as needed for mild pain       FLUoxetine (PROZAC) 20 MG capsule Take 1 capsule (20 mg) by mouth daily 90 capsule 1     multivitamin w/minerals (THERA-VIT-M) tablet Take 1 tablet by mouth daily       paragard intrauterine copper device 1 each by Intrauterine route once       vitamin C (ASCORBIC ACID) 1000 MG TABS Take 1,000 mg by mouth daily       Chart documentation with Dragon Voice recognition Software. Although reviewed after completion, some words and grammatical errors may remain.  Janet Yap MSN,FNP-[facility]  [id number] [place] [id number] [phone]            Subjective     HPI related to upcoming procedure: port removal planned for 10/17/2022  Chemo finished 9/14/2022   Next scan is November December 5th next exam   No periods since July- IUD in place    Preop Questions 10/14/2022   1. Have you ever had a heart attack or stroke? No   2. Have you ever had surgery on your heart or blood vessels, such as a stent placement, a coronary artery bypass, or surgery on an artery in your head, neck, heart, or legs? No   3. Do you have chest pain with activity? No   4. Do you have a history of  heart failure? No   5. Do you currently have a cold, bronchitis or symptoms of other infection? No   6. Do you have a cough, shortness of breath, or wheezing? No   7. Do you or anyone in your family have previous history of blood clots? No   8. Do you or does anyone in your family have a serious bleeding problem such as prolonged bleeding following surgeries or cuts? No   9. Have you ever had problems with anemia or been told to take iron pills? No   10. Have you had any abnormal blood loss such as black, tarry or bloody stools, or abnormal vaginal bleeding? No   11. Have you ever had a blood transfusion? No   12. Are you willing to have a  blood transfusion if it is medically needed before, during, or after your surgery? Yes   13. Have you or any of your relatives ever had problems with anesthesia? No   14. Do you have sleep apnea, excessive snoring or daytime drowsiness? No   15. Do you have any artifical heart valves or other implanted medical devices like a pacemaker, defibrillator, or continuous glucose monitor? No   16. Do you have artificial joints? No   17. Are you allergic to latex? No   18. Is there any chance that you may be pregnant? No       Health Care Directive:  Patient does not have a Health Care Directive or Living Will:     Preoperative Review of :   reviewed - no record of controlled substances prescribed.        Review of Systems  Constitutional, neuro, ENT, endocrine, pulmonary, cardiac, gastrointestinal, genitourinary, musculoskeletal, integument and psychiatric systems are negative, except as otherwise noted.    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 Provider to review        Past Medical History:   Diagnosis Date     Obese      Past Surgical History:   Procedure Laterality Date     BIOPSY NODE SENTINEL Bilateral 6/15/2022    Procedure: BILATERAL AXILLARY SENTINEL LYMPH NODE BIOPSIES;  Surgeon: Daria Sigala MD;  Location: [facility] OR     INSERT PORT VASCULAR ACCESS N/A 6/15/2022    Procedure: PORT  PLACEMENT;  Surgeon: Daria Sigala MD;  Location: [facility] OR     MASTECTOMY SIMPLE BILATERAL Bilateral 6/15/2022    Procedure: BILATERAL SKIN SPARING MASTECTOMIES;  Surgeon: Daria Sigala MD;  Location: [facility] OR     RECONSTRUCT BREAST, INSERT TISSUE EXPANDER BILATERAL, COMBINED Bilateral 6/15/2022    Procedure: BILATERAL BREAST RECONSTRUCTION WITH TISSUE EXPANDERS;  Surgeon: Charles Ladd MD;  Location:  OR     [facility] ORAL SURGERY PROCEDURE      wisdom teeth, hypotension with anesthesia     Current Outpatient Medications   Medication Sig Dispense Refill     acetaminophen (TYLENOL) 325 MG tablet Take 325-650 mg by mouth every 6 hours as needed for mild pain       ibuprofen (ADVIL/MOTRIN) 600 MG tablet Take 600 mg by mouth every 6 hours as needed for moderate pain       multivitamin w/minerals (THERA-VIT-M) tablet Take 1 tablet by mouth daily       paragard intrauterine copper device 1 each by Intrauterine route once       vitamin C (ASCORBIC ACID) 1000 MG TABS Take 1,000 mg by mouth daily         No Known Allergies     Social History     Tobacco Use     Smoking status: Former     Types: Cigarettes, Cigars     Quit date:      Years since quittin.     Smokeless tobacco: Never     Tobacco comments:     Quit    Substance Use Topics     Alcohol use: Yes     Comment: occ       History   Drug Use No         Objective     /72   Pulse 91   Temp 98.2  F (36.8  C) (Tympanic)   Wt 98 kg (216 lb)   SpO2 99%   BMI 38.26 kg/m      Physical Exam    GENERAL APPEARANCE: healthy, alert and no distress     EYES: EOMI, PERRL     HENT: ear canals and TM's normal and nose and mouth without ulcers or lesions     NECK: no adenopathy, no asymmetry, masses, or scars and thyroid normal to palpation     RESP: lungs clear to auscultation - no rales, rhonchi or wheezes     CV: regular rates and rhythm, normal S1 S2, no S3 or S4 and no murmur, click or rub     ABDOMEN:  soft, nontender, no HSM or masses and bowel  sounds normal     MS: extremities normal- no gross deformities noted, no evidence of inflammation in joints, FROM in all extremities.     SKIN: no suspicious lesions or rashes     Acquired alopecia,  port left anterior chest wall.      NEURO: Normal strength and tone, sensory exam grossly normal, mentation intact and speech normal     PSYCH: mentation appears normal. and affect normal/bright     LYMPHATICS: No cervical adenopathy    Recent Labs   Lab Test 09/14/22  1125 08/24/22  0917   HGB 11.3* 11.2*    264    143   POTASSIUM 4.6 4.0   CR 0.71 1.02        Diagnostics:  Labs ok for surgery   No EKG required, no history of coronary heart disease, significant arrhythmia, peripheral arterial disease or other structural heart disease.    Results for orders placed or performed in visit on 10/14/22   HCG Qual, Urine ([id number])     Status: Normal   Result Value Ref Range    hCG Urine Qualitative Negative Negative   Basic metabolic panel  (Ca, Cl, CO2, Creat, Gluc, K, Na, BUN)     Status: Abnormal   Result Value Ref Range    Sodium 140 136 - 145 mmol/L    Potassium 4.6 3.4 - 5.3 mmol/L    Chloride 103 98 - 107 mmol/L    Carbon Dioxide (CO2) 24 22 - 29 mmol/L    Anion Gap 13 7 - 15 mmol/L    Urea Nitrogen 13.8 6.0 - 20.0 mg/dL    Creatinine 0.80 0.51 - 0.95 mg/dL    Calcium 9.6 8.6 - 10.0 mg/dL    Glucose 108 (H) 70 - 99 mg/dL    GFR Estimate 90 >60 mL/min/1.73m2   CBC with platelets and differential     Status: Abnormal   Result Value Ref Range    WBC Count 6.3 4.0 - 11.0 10e3/uL    RBC Count 3.56 (L) 3.80 - 5.20 10e6/uL    Hemoglobin 12.2 11.7 - 15.7 g/dL    Hematocrit 37.2 35.0 - 47.0 %     (H) 78 - 100 fL    MCH 34.3 (H) 26.5 - 33.0 pg    MCHC 32.8 31.5 - 36.5 g/dL    RDW 14.9 10.0 - 15.0 %    Platelet Count 292 150 - 450 10e3/uL    % Neutrophils 69 %    % Lymphocytes 18 %    % Monocytes 8 %    % Eosinophils 4 %    % Basophils 1 %    % Immature Granulocytes 0 %    Absolute Neutrophils 4.3 1.6 - 8.3  10e3/uL    Absolute Lymphocytes 1.1 0.8 - 5.3 10e3/uL    Absolute Monocytes 0.5 0.0 - 1.3 10e3/uL    Absolute Eosinophils 0.3 0.0 - 0.7 10e3/uL    Absolute Basophils 0.1 0.0 - 0.2 10e3/uL    Absolute Immature Granulocytes 0.0 <=0.4 10e3/uL   CBC with platelets and differential     Status: Abnormal    Narrative    The following orders were created for panel order CBC with platelets and differential.  Procedure                               Abnormality         Status                     ---------                               -----------         ------                     CBC with platelets and d...[[id number]]  Abnormal            Final result                 Please view results for these tests on the individual orders.       Signed Electronically by: JIMBO Diaz CNP  Copy of this evaluation report is provided to requesting physician.

## 2022-10-16 ENCOUNTER — ANESTHESIA EVENT (OUTPATIENT)
Dept: SURGERY | Facility: CLINIC | Age: 52
End: 2022-10-16
Payer: COMMERCIAL

## 2022-10-17 ENCOUNTER — APPOINTMENT (OUTPATIENT)
Dept: SURGERY | Facility: PHYSICIAN GROUP | Age: 52
End: 2022-10-17
Payer: COMMERCIAL

## 2022-10-17 ENCOUNTER — HOSPITAL ENCOUNTER (OUTPATIENT)
Facility: CLINIC | Age: 52
Discharge: HOME | End: 2022-10-17
Attending: SURGERY | Admitting: SURGERY
Payer: COMMERCIAL

## 2022-10-17 ENCOUNTER — ANESTHESIA (OUTPATIENT)
Dept: SURGERY | Facility: CLINIC | Age: 52
End: 2022-10-17
Payer: COMMERCIAL

## 2022-10-17 VITALS
HEART RATE: 68 BPM | WEIGHT: 217 LBS | TEMPERATURE: 98.1 F | HEIGHT: 63 IN | OXYGEN SATURATION: 98 % | BODY MASS INDEX: 38.45 KG/M2 | RESPIRATION RATE: 16 BRPM | SYSTOLIC BLOOD PRESSURE: 120 MMHG | DIASTOLIC BLOOD PRESSURE: 72 MMHG

## 2022-10-17 DIAGNOSIS — G89.18 POSTOPERATIVE PAIN: Primary | ICD-10-CM

## 2022-10-17 LAB
PATH REPORT.COMMENTS IMP SPEC: NORMAL
PATH REPORT.COMMENTS IMP SPEC: NORMAL
PATH REPORT.FINAL DX SPEC: NORMAL
PATH REPORT.GROSS SPEC: NORMAL
PATH REPORT.RELEVANT HX SPEC: NORMAL
PHOTO IMAGE: NORMAL

## 2022-10-17 PROCEDURE — 272N000001 HC OR GENERAL SUPPLY STERILE: Performed by: SURGERY

## 2022-10-17 PROCEDURE — 999N000141 HC STATISTIC PRE-PROCEDURE NURSING ASSESSMENT: Performed by: SURGERY

## 2022-10-17 PROCEDURE — 258N000003 HC RX IP 258 OP 636

## 2022-10-17 PROCEDURE — 36590 REMOVAL TUNNELED CV CATH: CPT | Performed by: SURGERY

## 2022-10-17 PROCEDURE — 250N000009 HC RX 250

## 2022-10-17 PROCEDURE — 88300 SURGICAL PATH GROSS: CPT | Mod: 26 | Performed by: PATHOLOGY

## 2022-10-17 PROCEDURE — 710N000009 HC RECOVERY PHASE 1, LEVEL 1, PER MIN: Performed by: SURGERY

## 2022-10-17 PROCEDURE — 250N000009 HC RX 250: Performed by: SURGERY

## 2022-10-17 PROCEDURE — 360N000075 HC SURGERY LEVEL 2, PER MIN: Performed by: SURGERY

## 2022-10-17 PROCEDURE — 250N000011 HC RX IP 250 OP 636

## 2022-10-17 PROCEDURE — 250N000009 HC RX 250: Performed by: STUDENT IN AN ORGANIZED HEALTH CARE EDUCATION/TRAINING PROGRAM

## 2022-10-17 PROCEDURE — 88300 SURGICAL PATH GROSS: CPT | Mod: TC | Performed by: SURGERY

## 2022-10-17 PROCEDURE — 710N000012 HC RECOVERY PHASE 2, PER MINUTE: Performed by: SURGERY

## 2022-10-17 PROCEDURE — 258N000003 HC RX IP 258 OP 636: Performed by: ANESTHESIOLOGY

## 2022-10-17 PROCEDURE — 370N000017 HC ANESTHESIA TECHNICAL FEE, PER MIN: Performed by: SURGERY

## 2022-10-17 RX ORDER — BUPIVACAINE HYDROCHLORIDE 5 MG/ML
INJECTION, SOLUTION EPIDURAL; INTRACAUDAL
Status: DISCONTINUED
Start: 2022-10-17 | End: 2022-10-17 | Stop reason: HOSPADM

## 2022-10-17 RX ORDER — ONDANSETRON 2 MG/ML
INJECTION INTRAMUSCULAR; INTRAVENOUS PRN
Status: DISCONTINUED | OUTPATIENT
Start: 2022-10-17 | End: 2022-10-17

## 2022-10-17 RX ORDER — HYDROCODONE BITARTRATE AND ACETAMINOPHEN 5; 325 MG/1; MG/1
1 TABLET ORAL EVERY 4 HOURS PRN
Qty: 3 TABLET | Refills: 0 | Status: SHIPPED | OUTPATIENT
Start: 2022-10-17 | End: 2022-10-20

## 2022-10-17 RX ORDER — HYDROCODONE BITARTRATE AND ACETAMINOPHEN 5; 325 MG/1; MG/1
1 TABLET ORAL
Status: DISCONTINUED | OUTPATIENT
Start: 2022-10-17 | End: 2022-10-17 | Stop reason: HOSPADM

## 2022-10-17 RX ORDER — SODIUM CHLORIDE, SODIUM LACTATE, POTASSIUM CHLORIDE, CALCIUM CHLORIDE 600; 310; 30; 20 MG/100ML; MG/100ML; MG/100ML; MG/100ML
INJECTION, SOLUTION INTRAVENOUS CONTINUOUS
Status: DISCONTINUED | OUTPATIENT
Start: 2022-10-17 | End: 2022-10-17 | Stop reason: HOSPADM

## 2022-10-17 RX ORDER — MEPERIDINE HYDROCHLORIDE 25 MG/ML
12.5 INJECTION INTRAMUSCULAR; INTRAVENOUS; SUBCUTANEOUS
Status: DISCONTINUED | OUTPATIENT
Start: 2022-10-17 | End: 2022-10-17 | Stop reason: HOSPADM

## 2022-10-17 RX ORDER — CEFAZOLIN SODIUM/WATER 2 G/20 ML
2 SYRINGE (ML) INTRAVENOUS
Status: DISCONTINUED | OUTPATIENT
Start: 2022-10-17 | End: 2022-10-17 | Stop reason: HOSPADM

## 2022-10-17 RX ORDER — MAGNESIUM HYDROXIDE 1200 MG/15ML
LIQUID ORAL PRN
Status: DISCONTINUED | OUTPATIENT
Start: 2022-10-17 | End: 2022-10-17 | Stop reason: HOSPADM

## 2022-10-17 RX ORDER — OXYCODONE HYDROCHLORIDE 5 MG/1
5 TABLET ORAL EVERY 4 HOURS PRN
Status: DISCONTINUED | OUTPATIENT
Start: 2022-10-17 | End: 2022-10-17 | Stop reason: HOSPADM

## 2022-10-17 RX ORDER — PROPOFOL 10 MG/ML
INJECTION, EMULSION INTRAVENOUS PRN
Status: DISCONTINUED | OUTPATIENT
Start: 2022-10-17 | End: 2022-10-17

## 2022-10-17 RX ORDER — HYDROMORPHONE HCL IN WATER/PF 6 MG/30 ML
0.2 PATIENT CONTROLLED ANALGESIA SYRINGE INTRAVENOUS EVERY 5 MIN PRN
Status: DISCONTINUED | OUTPATIENT
Start: 2022-10-17 | End: 2022-10-17 | Stop reason: HOSPADM

## 2022-10-17 RX ORDER — FENTANYL CITRATE 0.05 MG/ML
25 INJECTION, SOLUTION INTRAMUSCULAR; INTRAVENOUS
Status: DISCONTINUED | OUTPATIENT
Start: 2022-10-17 | End: 2022-10-17 | Stop reason: HOSPADM

## 2022-10-17 RX ORDER — CEFAZOLIN SODIUM/WATER 2 G/20 ML
2 SYRINGE (ML) INTRAVENOUS SEE ADMIN INSTRUCTIONS
Status: DISCONTINUED | OUTPATIENT
Start: 2022-10-17 | End: 2022-10-17 | Stop reason: HOSPADM

## 2022-10-17 RX ORDER — ONDANSETRON 4 MG/1
4 TABLET, ORALLY DISINTEGRATING ORAL EVERY 30 MIN PRN
Status: DISCONTINUED | OUTPATIENT
Start: 2022-10-17 | End: 2022-10-17 | Stop reason: HOSPADM

## 2022-10-17 RX ORDER — ONDANSETRON 2 MG/ML
4 INJECTION INTRAMUSCULAR; INTRAVENOUS EVERY 30 MIN PRN
Status: DISCONTINUED | OUTPATIENT
Start: 2022-10-17 | End: 2022-10-17 | Stop reason: HOSPADM

## 2022-10-17 RX ORDER — PROPOFOL 10 MG/ML
INJECTION, EMULSION INTRAVENOUS CONTINUOUS PRN
Status: DISCONTINUED | OUTPATIENT
Start: 2022-10-17 | End: 2022-10-17

## 2022-10-17 RX ORDER — FENTANYL CITRATE 0.05 MG/ML
25 INJECTION, SOLUTION INTRAMUSCULAR; INTRAVENOUS EVERY 5 MIN PRN
Status: DISCONTINUED | OUTPATIENT
Start: 2022-10-17 | End: 2022-10-17 | Stop reason: HOSPADM

## 2022-10-17 RX ORDER — FENTANYL CITRATE 50 UG/ML
INJECTION, SOLUTION INTRAMUSCULAR; INTRAVENOUS PRN
Status: DISCONTINUED | OUTPATIENT
Start: 2022-10-17 | End: 2022-10-17

## 2022-10-17 RX ORDER — LIDOCAINE HYDROCHLORIDE 20 MG/ML
INJECTION, SOLUTION INFILTRATION; PERINEURAL PRN
Status: DISCONTINUED | OUTPATIENT
Start: 2022-10-17 | End: 2022-10-17

## 2022-10-17 RX ORDER — LIDOCAINE HYDROCHLORIDE 10 MG/ML
INJECTION, SOLUTION EPIDURAL; INFILTRATION; INTRACAUDAL; PERINEURAL
Status: DISCONTINUED
Start: 2022-10-17 | End: 2022-10-17 | Stop reason: HOSPADM

## 2022-10-17 RX ADMIN — PROPOFOL 20 MG: 10 INJECTION, EMULSION INTRAVENOUS at 07:41

## 2022-10-17 RX ADMIN — SODIUM CHLORIDE, POTASSIUM CHLORIDE, SODIUM LACTATE AND CALCIUM CHLORIDE: 600; 310; 30; 20 INJECTION, SOLUTION INTRAVENOUS at 06:18

## 2022-10-17 RX ADMIN — MIDAZOLAM 2 MG: 1 INJECTION INTRAMUSCULAR; INTRAVENOUS at 07:37

## 2022-10-17 RX ADMIN — FENTANYL CITRATE 25 MCG: 50 INJECTION, SOLUTION INTRAMUSCULAR; INTRAVENOUS at 07:58

## 2022-10-17 RX ADMIN — SODIUM CHLORIDE, POTASSIUM CHLORIDE, SODIUM LACTATE AND CALCIUM CHLORIDE: 600; 310; 30; 20 INJECTION, SOLUTION INTRAVENOUS at 08:30

## 2022-10-17 RX ADMIN — PROPOFOL 150 MCG/KG/MIN: 10 INJECTION, EMULSION INTRAVENOUS at 07:58

## 2022-10-17 RX ADMIN — FENTANYL CITRATE 25 MCG: 50 INJECTION, SOLUTION INTRAMUSCULAR; INTRAVENOUS at 07:45

## 2022-10-17 RX ADMIN — PHENYLEPHRINE HYDROCHLORIDE 100 MCG: 10 INJECTION INTRAVENOUS at 08:10

## 2022-10-17 RX ADMIN — FENTANYL CITRATE 25 MCG: 50 INJECTION, SOLUTION INTRAMUSCULAR; INTRAVENOUS at 07:41

## 2022-10-17 RX ADMIN — PHENYLEPHRINE HYDROCHLORIDE 100 MCG: 10 INJECTION INTRAVENOUS at 08:02

## 2022-10-17 RX ADMIN — LIDOCAINE HYDROCHLORIDE 50 MG: 20 INJECTION, SOLUTION INFILTRATION; PERINEURAL at 07:38

## 2022-10-17 RX ADMIN — PROPOFOL 150 MCG/KG/MIN: 10 INJECTION, EMULSION INTRAVENOUS at 07:39

## 2022-10-17 RX ADMIN — PROPOFOL 10 MG: 10 INJECTION, EMULSION INTRAVENOUS at 07:56

## 2022-10-17 RX ADMIN — PROPOFOL 10 MG: 10 INJECTION, EMULSION INTRAVENOUS at 07:42

## 2022-10-17 RX ADMIN — ONDANSETRON 4 MG: 2 INJECTION INTRAMUSCULAR; INTRAVENOUS at 07:42

## 2022-10-17 RX ADMIN — PROPOFOL 30 MG: 10 INJECTION, EMULSION INTRAVENOUS at 08:00

## 2022-10-17 RX ADMIN — PROPOFOL 20 MG: 10 INJECTION, EMULSION INTRAVENOUS at 07:54

## 2022-10-17 RX ADMIN — FENTANYL CITRATE 25 MCG: 50 INJECTION, SOLUTION INTRAMUSCULAR; INTRAVENOUS at 08:00

## 2022-10-17 ASSESSMENT — ACTIVITIES OF DAILY LIVING (ADL)
ADLS_ACUITY_SCORE: 35
ADLS_ACUITY_SCORE: 35

## 2022-10-17 ASSESSMENT — LIFESTYLE VARIABLES: TOBACCO_USE: 1

## 2022-10-17 NOTE — OR NURSING
Patient brought a picture of home covid antigen test on phone as directed.  I personally saw this result and it was time stamped 10/16/22.  Result was NEGATIVE.

## 2022-10-17 NOTE — OP NOTE
General Surgery Operative Note    PREOPERATIVE DIAGNOSIS: Breast cancer     POSTOPERATIVE DIAGNOSIS: Breast cancer    PROCEDURE: Portacath removal    ANESTHESIA:  Local with MAC     SURGEON:  Daria Sigala MD    ASSISTANT:  Dr.Kofi Andres assisted in the above procedure. They provided assistance with pre-operative positioning, prepping, and draping of the patient. The assistant provided vital operative assistance with retraction using instruments thus providing the necessary exposure and visualization for the case, manipulation of tissues to achieve hemostasis, and assisted in closure of the wound. Post-operatively they assisted in transfer of the patient off the operative table and transition to the PACU physicians.    INDICATIONS:  Risa Alcaraz is a 51 year old female with a history of breast cancer.  Port was placed to facilitate administration of chemotherapy.  The patient has now completed her treatment and presents for removal of her port.     PROCEDURE: The patient was taken to the operating room and placed on the table in supine position.    IV anesthetic was administered.  The left upper chest and neck were prepped and draped in standard sterile fashion.  We anesthetized the skin of the upper left chest at the prior incision for port placement.  A #15 blade was used to make an elliptical skin incision encompassing the patient's previous incision.  The resected incision scar was discarded.  The subcutaneous tissue was divided with cautery. The port was encountered. The prolene sutures were cut and removed. The port was removed from the pocket. A figure of eight 3-0 vicryl suture was placed around the catheter track and the catheter was then removed and the suture tied down. Pressure was held at the neck for 5 minutes.  The majority of the patient's surgical site seroma cavity was excised.  Hemostasis was excellent. We then closed the subcutaneous tissue with 3-0 interrupted Vicryl sutures.  The skin was  closed with a running 4-0 Monocryl subcuticular suture and skin glue was applied.  The patient tolerated the procedure well.  All sponge and instrument counts were correct    ESTIMATED BLOOD LOSS:  5 ml     INTRAOPERATIVE FINDINGS:  Port removed in its entirety    SPECIMENS: Portacath    DRAINS: None    CONDITION: The patient will be discharged to home directly from the Postanesthesia Care Unit with instructions for postoperative cares and medications for pain management.      Daria Sigala MD

## 2022-10-17 NOTE — INTERVAL H&P NOTE
"I have reviewed the surgical (or preoperative) H&P that is linked to this encounter, and examined the patient. There are no significant changes    Clinical Conditions Present on Arrival:  Clinically Significant Risk Factors Present on Admission                   # Obesity: Estimated body mass index is 38.44 kg/m  as calculated from the following:    Height as of this encounter: 1.6 m (5' 3\").    Weight as of this encounter: 98.4 kg (217 lb).       "

## 2022-10-17 NOTE — ANESTHESIA POSTPROCEDURE EVALUATION
Patient: Risa Alcaraz    Procedure: Procedure(s):  PORT REMOVAL       Anesthesia Type:  MAC    Note:  Disposition: Outpatient   Postop Pain Control: Uneventful            Sign Out: Well controlled pain   PONV: No   Neuro/Psych: Uneventful            Sign Out: Acceptable/Baseline neuro status   Airway/Respiratory: Uneventful            Sign Out: Acceptable/Baseline resp. status   CV/Hemodynamics: Uneventful            Sign Out: Acceptable CV status   Other NRE: NONE   DID A NON-ROUTINE EVENT OCCUR? No           Last vitals:  Vitals Value Taken Time   /68 10/17/22 0908   Temp 36.7  C (98.1  F) 10/17/22 0838   Pulse 67 10/17/22 0908   Resp 19 10/17/22 0908   SpO2 98 % 10/17/22 0908       Electronically Signed By: Michele Elias MD  October 17, 2022  4:02 PM

## 2022-10-17 NOTE — DISCHARGE INSTRUCTIONS
Same Day Surgery Discharge Instructions for  Sedation and General Anesthesia     It's not unusual to feel dizzy, light-headed or faint for up to 24 hours after surgery or while taking pain medication.  If you have these symptoms: sit for a few minutes before standing and have someone assist you when you get up to walk or use the bathroom.    You should rest and relax for the next 24 hours. We recommend you make arrangements to have an adult stay with you for at least 24 hours after your discharge.  Avoid hazardous and strenuous activity.    DO NOT DRIVE any vehicle or operate mechanical equipment for 24 hours following the end of your surgery.  Even though you may feel normal, your reactions may be affected by the medication you have received.    Do not drink alcoholic beverages for 24 hours following surgery.     Slowly progress to your regular diet as you feel able. It's not unusual to feel nauseated and/or vomit after receiving anesthesia.  If you develop these symptoms, drink clear liquids (apple juice, ginger ale, broth, 7-up, etc. ) until you feel better.  If your nausea and vomiting persists for 24 hours, please notify your surgeon.      All narcotic pain medications, along with inactivity and anesthesia, can cause constipation. Drinking plenty of liquids and increasing fiber intake will help.    For any questions of a medical nature, call your surgeon.    Do not make important decisions for 24 hours.    If you had general anesthesia, you may have a sore throat for a couple of days related to the breathing tube used during surgery.  You may use Cepacol lozenges to help with this discomfort.  If it worsens or if you develop a fever, contact your surgeon.     If you feel your pain is not well managed with the pain medications prescribed by your surgeon, please contact your surgeon's office to let them know so they can address your concerns.          [facility]  Discharge  Instructions: Post-Operative Port Removal    ACTIVITY  Take frequent, short walks and increase your activity gradually.    Avoid strenuous physical activity or heavy lifting greater than 15-20 lbs. for 1 week.  You may climb stairs.  You may drive without restrictions when you are not using any prescription pain medication and feel comfortable in a car.  You may return to work/school when you are comfortable without any prescription pain medication.    WOUND CARE  You may remove your outer dressing or Band-Aids and shower 48 hours after the surgery.  Pat your incisions dry and leave them open to air.  Re-apply dressing (Band-Aids or gauze/tape) as needed for comfort or drainage.  You may have steri-strips (looks like white tape) or skin glue on your incisions.  You may peel off the steri-strips 2 weeks after your surgery if they have not peeled off on their own.  If you have skin glue, it will peel up and fall off on its own.  Do not soak your incisions in a tub or pool for 2 weeks.   Do not apply any lotions, creams, or ointments to your incision(s).  A ridge under your incision(s) is normal and will gradually resolve.    DIET  Start with liquids, then gradually resume your regular diet as tolerated.   Drink plenty of liquids to stay hydrated.    PAIN  Expect some tenderness and discomfort at the incision site(s).  Use the prescribed pain medication at your discretion.  Expect gradual resolution of your pain over several days.  You may take ibuprofen with food (unless you have been told not to) or acetaminophen/Tylenol instead of or in addition to your prescribed pain medication.  However, if you are taking Norco or Percocet, do not take any additional acetaminophen/Tylenol.  Do not drink alcohol or drive while you are taking pain medications.  You may apply ice to your incisions in 20 minute intervals as needed for the next 48 hours.  After that time, consider switching to heat if you prefer.    EXPECTATIONS  Pain  medications can cause constipation.  Limit use when possible.  Take an over the counter or prescribed stool softener/stimulant, such as Colace or Senna, 1-2 times a day with plenty of water.  You may take a mild over the counter laxative, such as Miralax or a suppository, as needed.    You may discontinue these medications once you are having regular bowel movements and/or are no longer taking your narcotic pain medication.    FOLLOW UP  You do not need to follow up with our office unless you have questions or concerns.    CALL OUR OFFICE -[phone] IF YOU HAVE:   Chills or fever above 101 F.  Increased redness, warmth, or drainage at your incisions.  Significant bleeding.  Pain not relieved by your pain medication or rest.  Increasing pain after the first 48 hours.  Any other concerns or questions.             .**If you have concerns or questions about your procedure,    please contact Dr Sigala at  [phone]**

## 2022-10-17 NOTE — OR NURSING
PATIENT DECLINED URINE PREGNANCY TEST, TEST WAS NEGATIVE 10/14/22, IUD IN PLACE AND YOUNGEST CHILD IS 18 AND HAS BEEN TAKING CHEMO.

## 2022-10-17 NOTE — ANESTHESIA PREPROCEDURE EVALUATION
Anesthesia Pre-Procedure Evaluation    Patient: Risa Alcaraz   MRN: [id number] : 1970        Procedure : Procedure(s):  PORT REMOVAL          Past Medical History:   Diagnosis Date     Obese       Past Surgical History:   Procedure Laterality Date     BIOPSY NODE SENTINEL Bilateral 6/15/2022    Procedure: BILATERAL AXILLARY SENTINEL LYMPH NODE BIOPSIES;  Surgeon: Daria Sigala MD;  Location:  OR     INSERT PORT VASCULAR ACCESS N/A 6/15/2022    Procedure: PORT PLACEMENT;  Surgeon: Daria Sigala MD;  Location:  OR     MASTECTOMY SIMPLE BILATERAL Bilateral 6/15/2022    Procedure: BILATERAL SKIN SPARING MASTECTOMIES;  Surgeon: Daria Sigala MD;  Location:  OR     RECONSTRUCT BREAST, INSERT TISSUE EXPANDER BILATERAL, COMBINED Bilateral 6/15/2022    Procedure: BILATERAL BREAST RECONSTRUCTION WITH TISSUE EXPANDERS;  Surgeon: Charles Ladd MD;  Location:  OR     [facility] ORAL SURGERY PROCEDURE      wisdom teeth, hypotension with anesthesia      No Known Allergies   Social History     Tobacco Use     Smoking status: Former     Types: Cigarettes, Cigars     Quit date:      Years since quittin.8     Smokeless tobacco: Never     Tobacco comments:     Quit    Substance Use Topics     Alcohol use: Yes     Comment: occ      Wt Readings from Last 1 Encounters:   10/17/22 98.4 kg (217 lb)        Anesthesia Evaluation   Pt has had prior anesthetic.     History of anesthetic complications   low blood pressure with anesthesia.    ROS/MED HX  ENT/Pulmonary:     (+) CATHIE risk factors, obese, tobacco use, Past use,     Neurologic:       Cardiovascular:     (+) -----Previous cardiac testing   Echo: Date: Results:  55-60 EF, otherwise WNL  Stress Test: Date: Results:    ECG Reviewed: Date: Results:    Cath: Date: Results:      METS/Exercise Tolerance: >4 METS    Hematologic:       Musculoskeletal:       GI/Hepatic:       Renal/Genitourinary:       Endo:     (+) Obesity,     Psychiatric/Substance  Patient was seen on Friday, April 21, and wants to return to the clinic today to be seen for a \"recheck\".  Patient complains of diarrehea since Tuesday.    Use:       Infectious Disease:       Malignancy:   (+) Malignancy, History of Breast.Breast CA Remission status post Surgery and Chemo.        Other:            Physical Exam    Airway        Mallampati: II   TM distance: > 3 FB   Neck ROM: full   Mouth opening: > 3 cm    Respiratory Devices and Support         Dental  no notable dental history         Cardiovascular          Rhythm and rate: regular and normal     Pulmonary   pulmonary exam normal                OUTSIDE LABS:  CBC:   Lab Results   Component Value Date    WBC 6.3 10/14/2022    WBC 8.2 09/14/2022    HGB 12.2 10/14/2022    HGB 11.3 (L) 09/14/2022    HCT 37.2 10/14/2022    HCT 34.6 (L) 09/14/2022     10/14/2022     09/14/2022     BMP:   Lab Results   Component Value Date     10/14/2022     09/14/2022    POTASSIUM 4.6 10/14/2022    POTASSIUM 4.6 09/14/2022    CHLORIDE 103 10/14/2022    CHLORIDE 106 09/14/2022    CO2 24 10/14/2022    CO2 24 09/14/2022    BUN 13.8 10/14/2022    BUN 15.6 09/14/2022    CR 0.80 10/14/2022    CR 0.71 09/14/2022     (H) 10/14/2022     (H) 09/14/2022     COAGS: No results found for: PTT, INR, FIBR  POC:   Lab Results   Component Value Date    BGM 91 10/26/2007    HCG Negative 10/14/2022     HEPATIC:   Lab Results   Component Value Date    ALBUMIN 4.1 09/14/2022    PROTTOTAL 6.6 09/14/2022    ALT 40 (H) 09/14/2022    AST 28 09/14/2022    ALKPHOS 109 (H) 09/14/2022    BILITOTAL 0.3 09/14/2022     OTHER:   Lab Results   Component Value Date    JAMIL 9.6 10/14/2022    TSH 3.69 01/30/2015       Anesthesia Plan    ASA Status:  2   NPO Status:  NPO Appropriate    Anesthesia Type: MAC.     - Reason for MAC: straight local not clinically adequate              Consents    Anesthesia Plan(s) and associated risks, benefits, and realistic alternatives discussed. Questions answered and patient/representative(s) expressed understanding.    - Discussed:     - Discussed with:  Patient         Postoperative  Care    Pain management: IV analgesics, Oral pain medications.   PONV prophylaxis: Ondansetron (or other 5HT-3)     Comments:    Other Comments: Propofol gtt            Michele Elias MD

## 2022-10-17 NOTE — ANESTHESIA CARE TRANSFER NOTE
Patient: Risa Alcaraz    Procedure: Procedure(s):  PORT REMOVAL       Diagnosis: Bilateral malignant neoplasm of breast in female, unspecified estrogen receptor status, unspecified site of breast (H) [C50.911, C50.912]  Diagnosis Additional Information: No value filed.    Anesthesia Type:   MAC     Note:    Oropharynx: oropharynx clear of all foreign objects and spontaneously breathing  Level of Consciousness: awake  Oxygen Supplementation: face mask  Level of Supplemental Oxygen (L/min / FiO2): 6  Independent Airway: airway patency satisfactory and stable  Dentition: dentition unchanged  Vital Signs Stable: post-procedure vital signs reviewed and stable  Report to RN Given: handoff report given  Patient transferred to: PACU  Comments: Pt to PACU with O2 via mask, airway patent, VSS. Report to RN.  Handoff Report: Identifed the Patient, Identified the Reponsible Provider, Reviewed the pertinent medical history, Discussed the surgical course, Reviewed Intra-OP anesthesia mangement and issues during anesthesia, Set expectations for post-procedure period and Allowed opportunity for questions and acknowledgement of understanding      Vitals:  Vitals Value Taken Time   /65 10/17/22 0838   Temp     Pulse 89 10/17/22 0840   Resp 14 10/17/22 0840   SpO2 96 % 10/17/22 0840   Vitals shown include unvalidated device data.    Electronically Signed By: JIMBO Lobato CRNA  October 17, 2022  8:41 AM

## 2022-10-20 ENCOUNTER — TELEPHONE (OUTPATIENT)
Dept: ONCOLOGY | Facility: CLINIC | Age: 52
End: 2022-10-20

## 2022-10-20 NOTE — TELEPHONE ENCOUNTER
Attempted to return call to Tia from [facility] at [phone]. Tia left message requesting update. Message says call cannot be accepted at this time. Tried x 2    Petrona Gonzalez RN

## 2022-11-18 ENCOUNTER — HOSPITAL ENCOUNTER (OUTPATIENT)
Dept: CT IMAGING | Facility: CLINIC | Age: 52
Discharge: HOME | End: 2022-11-18
Attending: INTERNAL MEDICINE | Admitting: INTERNAL MEDICINE
Payer: COMMERCIAL

## 2022-11-18 DIAGNOSIS — C50.111 MALIGNANT NEOPLASM OF CENTRAL PORTION OF RIGHT BREAST IN FEMALE, ESTROGEN RECEPTOR NEGATIVE (H): ICD-10-CM

## 2022-11-18 DIAGNOSIS — Z17.1 MALIGNANT NEOPLASM OF CENTRAL PORTION OF RIGHT BREAST IN FEMALE, ESTROGEN RECEPTOR NEGATIVE (H): ICD-10-CM

## 2022-11-18 DIAGNOSIS — R91.8 PULMONARY NODULES: ICD-10-CM

## 2022-11-18 PROCEDURE — 71250 CT THORAX DX C-: CPT

## 2022-11-19 ENCOUNTER — HEALTH MAINTENANCE LETTER (OUTPATIENT)
Age: 52
End: 2022-11-19

## 2022-11-30 NOTE — PROGRESS NOTES
Risa is a 52 year old who is being evaluated via a billable video visit.      How would you like to obtain your AVS? MyChart  If the video visit is dropped, the invitation should be resent by: Send to e-mail at: [email or web address]  Will anyone else be joining your video visit? No     Pebbles Chris RUBIO          Video-Visit Details    Video Start Time: 1:24 PM    Type of service:  Video Visit    Video End Time:1:41 PM    Originating Location (pt. Location): Home        Distant Location (provider location):  On-site    Platform used for Video Visit: [facility]    Hematology/Oncology Established Patient Note      Today's Date: 12/12/22    Reason for follow-up: Right breast cancer.    HISTORY OF PRESENT ILLNESS: Risa Alcaraz is a 51 year old female who presents with the following oncologic history:  1.  4/22/2022: Mammogram showed distortion in the central right breast.  Left breast is negative.  2.  5/2/2022: Diagnostic right mammogram showed distortion in the retroareolar breast.  Targeted ultrasound of the right breast at 12:00, 2 cm from the nipple measured 1.8 x 1.3 cm.  Survey of axilla showed no evidence of adenopathy.  3.  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  4. 5/24/2022: Breast MRI showed nonmass enhancement at the 12:00 position of the RIGHT breast corresponds to biopsy-proven malignancy and measures 4.3 x 2.3 x 3.2 cm. Nodular nonmass enhancement at the 12:00 position of the LEFT breast. No evidence of adenopathy.  5. 5/25/2022: CT chest/abdomen/pelvis showed 2 mm pulmonary nodule in right upper lobe posteriorly; mildly prominent prevascular lymph node between the right brachiocephalic and left common carotid arteries  measures 8 mm, upper limits of normal; no definite evidence of metastatic disease. NM bone scan showed no bone metastases.  6. 6/1/2022: Left breast U/S showed hypoechoic area measuring 2 cm at 11:00, 5 cm from nipple corresponding to MRI finding. Biopsy of left breast lesion showed nuclear grade 1 DCIS; no evidence of invasive carcinoma or LVI, ER and DE both 100% positive. After breast tumor board discussion, consensus was for patient to proceed with surgery upfront followed by adjuvant chemotherapy.  7. 6/9/2022: BRCA gene panel negative.  8. 6/15/2022: Underwent bilateral skin sparing mastectomies with bilateral axillary sentinel lymph node excision, port placement under care of Dr. Daria Sigala and reconstruction with tissue expanders with Dr. Charles Ladd.  Pathology showed 8 x 5 mm right breast invasive mammary carcinoma with features of low-grade fibromatosis like metaplastic carcinoma and without extension to margins.  Right breast tumor was multifocal with total of 3 foci: 8 mm, 3 mm, 2 mm.  Extensive DCIS, grade 1 and 2 with involvement of sclerosing adenosis and cancerization of lobules and without extension of margins.  A single right axillary lymph node is negative for metastatic disease.  Left breast showed extensive DCIS involving extensive sclerosing adenosis without extension to margins.  No evidence of invasive carcinoma in the left breast.  A single left axillary lymph node is negative for metastatic disease.  9.  7/13/2022: Started adjuvant chemotherapy with Taxotere and Cytoxan with completion on 9/14/2022.    INTERVAL HISTORY:  Risa reports memory issues, chemo-brain but this has been gradually improving. She has had trouble with recalling destination when she has been driving.  She is able to work from home.    REVIEW OF SYSTEMS:   14 point ROS was reviewed and is negative other than as noted above in HPI.       HOME MEDICATIONS:  Current Outpatient Medications   Medication Sig Dispense  Refill     multivitamin w/minerals (THERA-VIT-M) tablet Take 1 tablet by mouth daily       paragard intrauterine copper device 1 each by Intrauterine route once       vitamin C (ASCORBIC ACID) 1000 MG TABS Take 1,000 mg by mouth daily           ALLERGIES:  No Known Allergies      PAST MEDICAL HISTORY:  Past Medical History:   Diagnosis Date     Obese          PAST SURGICAL HISTORY:  Past Surgical History:   Procedure Laterality Date     BIOPSY NODE SENTINEL Bilateral 6/15/2022    Procedure: BILATERAL AXILLARY SENTINEL LYMPH NODE BIOPSIES;  Surgeon: Daria Sigala MD;  Location:  OR     INSERT PORT VASCULAR ACCESS N/A 6/15/2022    Procedure: PORT PLACEMENT;  Surgeon: Daria Sigala MD;  Location:  OR     MASTECTOMY SIMPLE BILATERAL Bilateral 6/15/2022    Procedure: BILATERAL SKIN SPARING MASTECTOMIES;  Surgeon: Daria Sigala MD;  Location:  OR     RECONSTRUCT BREAST, INSERT TISSUE EXPANDER BILATERAL, COMBINED Bilateral 6/15/2022    Procedure: BILATERAL BREAST RECONSTRUCTION WITH TISSUE EXPANDERS;  Surgeon: Charles Ladd MD;  Location:  OR     REMOVE PORT VASCULAR ACCESS N/A 10/17/2022    Procedure: PORT REMOVAL;  Surgeon: Daria Sigala MD;  Location:  OR     ZZC ORAL SURGERY PROCEDURE      wisdom teeth, hypotension with anesthesia         SOCIAL HISTORY:  Social History     Socioeconomic History     Marital status:      Spouse name: Not on file     Number of children: Not on file     Years of education: Not on file     Highest education level: Not on file   Occupational History     Employer: [employer]   Tobacco Use     Smoking status: Former     Types: Cigarettes, Cigars     Quit date:      Years since quittin.9     Smokeless tobacco: Never     Tobacco comments:     Quit    Vaping Use     Vaping Use: Never used   Substance and Sexual Activity     Alcohol use: Yes     Comment: occ     Drug use: No     Sexual activity: Yes     Partners: Male     Birth  control/protection: I.U.D.     Comment: mirena 2016   Other Topics Concern     Parent/sibling w/ CABG, MI or angioplasty before 65F 55M? Not Asked   Social History Narrative     Not on file     Social Determinants of Health     Financial Resource Strain: Not on file   Food Insecurity: Not on file   Transportation Needs: Not on file   Physical Activity: Not on file   Stress: Not on file   Social Connections: Not on file   Intimate Partner Violence: Not on file   Housing Stability: Not on file         FAMILY HISTORY:  Family History   Problem Relation Age of Onset     Diabetes Mother      Hypertension Mother      Cancer Father         penial     Hypertension Father      Cancer Maternal Grandmother         liver     Heart Disease Maternal Grandfather         MI     Cancer Paternal Grandmother         brain     Heart Disease Paternal Grandfather         MI         PHYSICAL EXAM:  Vital signs:  There were no vitals taken for this visit.   GENERAL: No acute distress.  EYES: No scleral icterus. No overt erythema.  RESPIRATORY: No audible cough, wheezing, or labored breathing.  MUSCULOSKELETAL: Range of motion in the neck, shoulders, and arms appear normal.  SKIN: No overt rashes, discolorations, or lesions over the face and neck. Alopecia present.  NEUROLOGIC: Alert.  No overt tremors.  PSYCHIATRIC: Normal affect and mood.  Does not appear anxious.    LABS:  CBC RESULTS: Recent Labs   Lab Test 10/14/22  1104   WBC 6.3   RBC 3.56*   HGB 12.2   HCT 37.2   *   MCH 34.3*   MCHC 32.8   RDW 14.9          Last Comprehensive Metabolic Panel:  Sodium   Date Value Ref Range Status   10/14/2022 140 136 - 145 mmol/L Final     Potassium   Date Value Ref Range Status   10/14/2022 4.6 3.4 - 5.3 mmol/L Final   08/24/2022 4.0 3.4 - 5.3 mmol/L Final     Chloride   Date Value Ref Range Status   10/14/2022 103 98 - 107 mmol/L Final   08/24/2022 112 (H) 94 - 109 mmol/L Final     Carbon Dioxide (CO2)   Date Value Ref Range Status    10/14/2022 24 22 - 29 mmol/L Final   2022 24 20 - 32 mmol/L Final     Anion Gap   Date Value Ref Range Status   10/14/2022 13 7 - 15 mmol/L Final   2022 7 3 - 14 mmol/L Final     Glucose   Date Value Ref Range Status   10/14/2022 108 (H) 70 - 99 mg/dL Final   2022 132 (H) 70 - 99 mg/dL Final   2015 95 70 - 99 mg/dL Final     Comment:     Effective 2014, the reference range for this assay has changed to reflect   new instrumentation/methodology.       Urea Nitrogen   Date Value Ref Range Status   10/14/2022 13.8 6.0 - 20.0 mg/dL Final   2022 15 7 - 30 mg/dL Final     Creatinine   Date Value Ref Range Status   10/14/2022 0.80 0.51 - 0.95 mg/dL Final     GFR Estimate   Date Value Ref Range Status   10/14/2022 90 >60 mL/min/1.73m2 Final     Comment:     Effective 2021 eGFRcr in adults is calculated using the  CKD-EPI creatinine equation which includes age and gender (Luh et al., NEJM, DOI: 10.1056/ZRRAwf2251820)     Calcium   Date Value Ref Range Status   10/14/2022 9.6 8.6 - 10.0 mg/dL Final     Bilirubin Total   Date Value Ref Range Status   2022 0.3 <=1.2 mg/dL Final     Alkaline Phosphatase   Date Value Ref Range Status   2022 109 (H) 35 - 104 U/L Final     ALT   Date Value Ref Range Status   2022 40 (H) 10 - 35 U/L Final     AST   Date Value Ref Range Status   2022 28 10 - 35 U/L Final       PATHOLOGY:  None new since prior visit.    IMAGIN2022: CT chest w/o contrast showed stable 2 mm nodule in posterior right upper lobe; no new or enlarging nodules; no lymphadenopathy.    ASSESSMENT/PLAN:  Risa Alcaraz is a 51 year old female with the following issues:  1.  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 Left breast DCIS  3. Chemo brain fogginess  - Risa is status post bilateral  mastectomies with negative surgical margins and no lymph node involvement followed by adjuvant chemotherapy with 4 cycles of Taxotere and Cytoxan.  - Discussed that her overall prognosis is still excellent given the lower tendency for the fibromatosis low-grade type of metaplastic cancer to metastasize distantly or to the lymph nodes.  - Again discussed that surveillance following completion of chemotherapy would largely be based on history and physical exams with imaging and lab work-up reserved for any concerning signs or symptoms that could arise.  --I have written a letter for her employer that she should work remotely through at least April 2023 due to her chemo brain/brain fogginess.    4. Indeterminate pulmonary nodule  --Reviewed 11/18/2022 CT chest which showed stable 2 mm nodule in right upper lobe. Plan to repeat CT chest in 11/2023.    Return in 4 months.    Briseyda Mars MD  Hematology/Oncology  [facility]    Total time spent: 30 minutes in patient evaluation, counseling, documentation, and coordination of care.

## 2022-12-06 ENCOUNTER — LAB (OUTPATIENT)
Dept: LAB | Facility: CLINIC | Age: 52
End: 2022-12-06
Payer: COMMERCIAL

## 2022-12-06 DIAGNOSIS — Z17.1 MALIGNANT NEOPLASM OF CENTRAL PORTION OF RIGHT BREAST IN FEMALE, ESTROGEN RECEPTOR NEGATIVE (H): ICD-10-CM

## 2022-12-06 DIAGNOSIS — C50.111 MALIGNANT NEOPLASM OF CENTRAL PORTION OF RIGHT BREAST IN FEMALE, ESTROGEN RECEPTOR NEGATIVE (H): ICD-10-CM

## 2022-12-06 LAB
ALBUMIN SERPL BCG-MCNC: 4.1 G/DL (ref 3.5–5.2)
ALP SERPL-CCNC: 116 U/L (ref 35–104)
ALT SERPL W P-5'-P-CCNC: 25 U/L (ref 10–35)
ANION GAP SERPL CALCULATED.3IONS-SCNC: 7 MMOL/L (ref 7–15)
AST SERPL W P-5'-P-CCNC: 27 U/L (ref 10–35)
BASOPHILS # BLD AUTO: 0 10E3/UL (ref 0–0.2)
BASOPHILS NFR BLD AUTO: 1 %
BILIRUB SERPL-MCNC: 0.7 MG/DL
BUN SERPL-MCNC: 13.2 MG/DL (ref 6–20)
CALCIUM SERPL-MCNC: 9.5 MG/DL (ref 8.6–10)
CHLORIDE SERPL-SCNC: 105 MMOL/L (ref 98–107)
CREAT SERPL-MCNC: 0.87 MG/DL (ref 0.51–0.95)
DEPRECATED HCO3 PLAS-SCNC: 30 MMOL/L (ref 22–29)
EOSINOPHIL # BLD AUTO: 0.4 10E3/UL (ref 0–0.7)
EOSINOPHIL NFR BLD AUTO: 8 %
ERYTHROCYTE [DISTWIDTH] IN BLOOD BY AUTOMATED COUNT: 12 % (ref 10–15)
GFR SERPL CREATININE-BSD FRML MDRD: 81 ML/MIN/1.73M2
GLUCOSE SERPL-MCNC: 118 MG/DL (ref 70–99)
HCT VFR BLD AUTO: 41.7 % (ref 35–47)
HGB BLD-MCNC: 13.7 G/DL (ref 11.7–15.7)
IMM GRANULOCYTES # BLD: 0 10E3/UL
IMM GRANULOCYTES NFR BLD: 0 %
LYMPHOCYTES # BLD AUTO: 1.3 10E3/UL (ref 0.8–5.3)
LYMPHOCYTES NFR BLD AUTO: 26 %
MCH RBC QN AUTO: 32.8 PG (ref 26.5–33)
MCHC RBC AUTO-ENTMCNC: 32.9 G/DL (ref 31.5–36.5)
MCV RBC AUTO: 100 FL (ref 78–100)
MONOCYTES # BLD AUTO: 0.3 10E3/UL (ref 0–1.3)
MONOCYTES NFR BLD AUTO: 7 %
NEUTROPHILS # BLD AUTO: 2.8 10E3/UL (ref 1.6–8.3)
NEUTROPHILS NFR BLD AUTO: 59 %
PLATELET # BLD AUTO: 229 10E3/UL (ref 150–450)
POTASSIUM SERPL-SCNC: 3.6 MMOL/L (ref 3.4–5.3)
PROT SERPL-MCNC: 6.8 G/DL (ref 6.4–8.3)
RBC # BLD AUTO: 4.18 10E6/UL (ref 3.8–5.2)
SODIUM SERPL-SCNC: 142 MMOL/L (ref 136–145)
WBC # BLD AUTO: 4.8 10E3/UL (ref 4–11)

## 2022-12-06 PROCEDURE — 85025 COMPLETE CBC W/AUTO DIFF WBC: CPT

## 2022-12-06 PROCEDURE — 36415 COLL VENOUS BLD VENIPUNCTURE: CPT

## 2022-12-06 PROCEDURE — 80053 COMPREHEN METABOLIC PANEL: CPT

## 2022-12-12 ENCOUNTER — TELEPHONE (OUTPATIENT)
Dept: ONCOLOGY | Facility: CLINIC | Age: 52
End: 2022-12-12

## 2022-12-12 ENCOUNTER — VIRTUAL VISIT (OUTPATIENT)
Dept: ONCOLOGY | Facility: CLINIC | Age: 52
End: 2022-12-12
Attending: INTERNAL MEDICINE
Payer: COMMERCIAL

## 2022-12-12 DIAGNOSIS — D05.12 DUCTAL CARCINOMA IN SITU (DCIS) OF LEFT BREAST: ICD-10-CM

## 2022-12-12 DIAGNOSIS — R91.8 PULMONARY NODULES: ICD-10-CM

## 2022-12-12 DIAGNOSIS — C50.111 MALIGNANT NEOPLASM OF CENTRAL PORTION OF RIGHT BREAST IN FEMALE, ESTROGEN RECEPTOR NEGATIVE (H): Primary | ICD-10-CM

## 2022-12-12 DIAGNOSIS — Z17.1 MALIGNANT NEOPLASM OF CENTRAL PORTION OF RIGHT BREAST IN FEMALE, ESTROGEN RECEPTOR NEGATIVE (H): Primary | ICD-10-CM

## 2022-12-12 PROCEDURE — 99214 OFFICE O/P EST MOD 30 MIN: CPT | Mod: 95 | Performed by: INTERNAL MEDICINE

## 2022-12-12 PROCEDURE — G0463 HOSPITAL OUTPT CLINIC VISIT: HCPCS | Mod: PN,GT | Performed by: INTERNAL MEDICINE

## 2022-12-12 NOTE — LETTER
12/12/2022         RE: Risa Alcaraz  [address] [address] [address]        Dear Colleague,    Thank you for referring your patient, Risa Alcaraz, to the [facility]. Please see a copy of my visit note below.    Risa is a 52 year old who is being evaluated via a billable video visit.      How would you like to obtain your AVS? MyChart  If the video visit is dropped, the invitation should be resent by: Send to e-mail at: [email or web address]  Will anyone else be joining your video visit? No     Pebbles RUBIO          Video-Visit Details    Video Start Time: 1:24 PM    Type of service:  Video Visit    Video End Time:1:41 PM    Originating Location (pt. Location): Home        Distant Location (provider location):  On-site    Platform used for Video Visit: [facility]    Hematology/Oncology Established Patient Note      Today's Date: 12/12/22    Reason for follow-up: Right breast cancer.    HISTORY OF PRESENT ILLNESS: Risa Alcaraz is a 51 year old female who presents with the following oncologic history:  1.  4/22/2022: Mammogram showed distortion in the central right breast.  Left breast is negative.  2.  5/2/2022: Diagnostic right mammogram showed distortion in the retroareolar breast.  Targeted ultrasound of the right breast at 12:00, 2 cm from the nipple measured 1.8 x 1.3 cm.  Survey of axilla showed no evidence of adenopathy.  3.  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  4. 5/24/2022: Breast MRI showed nonmass enhancement at the 12:00 position of the RIGHT breast corresponds to biopsy-proven malignancy and measures 4.3 x 2.3 x 3.2 cm. Nodular nonmass enhancement at the 12:00 position of the  LEFT breast. No evidence of adenopathy.  5. 5/25/2022: CT chest/abdomen/pelvis showed 2 mm pulmonary nodule in right upper lobe posteriorly; mildly prominent prevascular lymph node between the right brachiocephalic and left common carotid arteries measures 8 mm, upper limits of normal; no definite evidence of metastatic disease. NM bone scan showed no bone metastases.  6. 6/1/2022: Left breast U/S showed hypoechoic area measuring 2 cm at 11:00, 5 cm from nipple corresponding to MRI finding. Biopsy of left breast lesion showed nuclear grade 1 DCIS; no evidence of invasive carcinoma or LVI, ER and VT both 100% positive. After breast tumor board discussion, consensus was for patient to proceed with surgery upfront followed by adjuvant chemotherapy.  7. 6/9/2022: BRCA gene panel negative.  8. 6/15/2022: Underwent bilateral skin sparing mastectomies with bilateral axillary sentinel lymph node excision, port placement under care of Dr. Daria Sigala and reconstruction with tissue expanders with Dr. Charles Ladd.  Pathology showed 8 x 5 mm right breast invasive mammary carcinoma with features of low-grade fibromatosis like metaplastic carcinoma and without extension to margins.  Right breast tumor was multifocal with total of 3 foci: 8 mm, 3 mm, 2 mm.  Extensive DCIS, grade 1 and 2 with involvement of sclerosing adenosis and cancerization of lobules and without extension of margins.  A single right axillary lymph node is negative for metastatic disease.  Left breast showed extensive DCIS involving extensive sclerosing adenosis without extension to margins.  No evidence of invasive carcinoma in the left breast.  A single left axillary lymph node is negative for metastatic disease.  9.  7/13/2022: Started adjuvant chemotherapy with Taxotere and Cytoxan with completion on 9/14/2022.    INTERVAL HISTORY:  Risa reports memory issues, chemo-brain but this has been gradually improving. She has had trouble with recalling  destination when she has been driving.  She is able to work from home.    REVIEW OF SYSTEMS:   14 point ROS was reviewed and is negative other than as noted above in HPI.       HOME MEDICATIONS:  Current Outpatient Medications   Medication Sig Dispense Refill     multivitamin w/minerals (THERA-VIT-M) tablet Take 1 tablet by mouth daily       paragard intrauterine copper device 1 each by Intrauterine route once       vitamin C (ASCORBIC ACID) 1000 MG TABS Take 1,000 mg by mouth daily           ALLERGIES:  No Known Allergies      PAST MEDICAL HISTORY:  Past Medical History:   Diagnosis Date     Obese          PAST SURGICAL HISTORY:  Past Surgical History:   Procedure Laterality Date     BIOPSY NODE SENTINEL Bilateral 6/15/2022    Procedure: BILATERAL AXILLARY SENTINEL LYMPH NODE BIOPSIES;  Surgeon: Daria Sigala MD;  Location:  OR     INSERT PORT VASCULAR ACCESS N/A 6/15/2022    Procedure: PORT PLACEMENT;  Surgeon: Daria Sigala MD;  Location:  OR     MASTECTOMY SIMPLE BILATERAL Bilateral 6/15/2022    Procedure: BILATERAL SKIN SPARING MASTECTOMIES;  Surgeon: Daria Sigala MD;  Location:  OR     RECONSTRUCT BREAST, INSERT TISSUE EXPANDER BILATERAL, COMBINED Bilateral 6/15/2022    Procedure: BILATERAL BREAST RECONSTRUCTION WITH TISSUE EXPANDERS;  Surgeon: Charles Ladd MD;  Location:  OR     REMOVE PORT VASCULAR ACCESS N/A 10/17/2022    Procedure: PORT REMOVAL;  Surgeon: Daria Sigala MD;  Location:  OR     [facility] ORAL SURGERY PROCEDURE      wisdom teeth, hypotension with anesthesia         SOCIAL HISTORY:  Social History     Socioeconomic History     Marital status:      Spouse name: Not on file     Number of children: Not on file     Years of education: Not on file     Highest education level: Not on file   Occupational History     Employer: [employer]   Tobacco Use     Smoking status: Former     Types: Cigarettes, Cigars     Quit date:      Years since quittin.9      Smokeless tobacco: Never     Tobacco comments:     Quit 1996   Vaping Use     Vaping Use: Never used   Substance and Sexual Activity     Alcohol use: Yes     Comment: occ     Drug use: No     Sexual activity: Yes     Partners: Male     Birth control/protection: I.U.D.     Comment: mirena 2016   Other Topics Concern     Parent/sibling w/ CABG, MI or angioplasty before 65F 55M? Not Asked   Social History Narrative     Not on file     Social Determinants of Health     Financial Resource Strain: Not on file   Food Insecurity: Not on file   Transportation Needs: Not on file   Physical Activity: Not on file   Stress: Not on file   Social Connections: Not on file   Intimate Partner Violence: Not on file   Housing Stability: Not on file         FAMILY HISTORY:  Family History   Problem Relation Age of Onset     Diabetes Mother      Hypertension Mother      Cancer Father         penial     Hypertension Father      Cancer Maternal Grandmother         liver     Heart Disease Maternal Grandfather         MI     Cancer Paternal Grandmother         brain     Heart Disease Paternal Grandfather         MI         PHYSICAL EXAM:  Vital signs:  There were no vitals taken for this visit.   GENERAL: No acute distress.  EYES: No scleral icterus. No overt erythema.  RESPIRATORY: No audible cough, wheezing, or labored breathing.  MUSCULOSKELETAL: Range of motion in the neck, shoulders, and arms appear normal.  SKIN: No overt rashes, discolorations, or lesions over the face and neck. Alopecia present.  NEUROLOGIC: Alert.  No overt tremors.  PSYCHIATRIC: Normal affect and mood.  Does not appear anxious.    LABS:  CBC RESULTS: Recent Labs   Lab Test 10/14/22  1104   WBC 6.3   RBC 3.56*   HGB 12.2   HCT 37.2   *   MCH 34.3*   MCHC 32.8   RDW 14.9          Last Comprehensive Metabolic Panel:  Sodium   Date Value Ref Range Status   10/14/2022 140 136 - 145 mmol/L Final     Potassium   Date Value Ref Range Status   10/14/2022 4.6  3.4 - 5.3 mmol/L Final   2022 4.0 3.4 - 5.3 mmol/L Final     Chloride   Date Value Ref Range Status   10/14/2022 103 98 - 107 mmol/L Final   2022 112 (H) 94 - 109 mmol/L Final     Carbon Dioxide (CO2)   Date Value Ref Range Status   10/14/2022 24 22 - 29 mmol/L Final   2022 24 20 - 32 mmol/L Final     Anion Gap   Date Value Ref Range Status   10/14/2022 13 7 - 15 mmol/L Final   2022 7 3 - 14 mmol/L Final     Glucose   Date Value Ref Range Status   10/14/2022 108 (H) 70 - 99 mg/dL Final   2022 132 (H) 70 - 99 mg/dL Final   2015 95 70 - 99 mg/dL Final     Comment:     Effective 2014, the reference range for this assay has changed to reflect   new instrumentation/methodology.       Urea Nitrogen   Date Value Ref Range Status   10/14/2022 13.8 6.0 - 20.0 mg/dL Final   2022 15 7 - 30 mg/dL Final     Creatinine   Date Value Ref Range Status   10/14/2022 0.80 0.51 - 0.95 mg/dL Final     GFR Estimate   Date Value Ref Range Status   10/14/2022 90 >60 mL/min/1.73m2 Final     Comment:     Effective 2021 eGFRcr in adults is calculated using the  CKD-EPI creatinine equation which includes age and gender (Luh et al., NEJ, DOI: 10.1056/EAZCsq3420500)     Calcium   Date Value Ref Range Status   10/14/2022 9.6 8.6 - 10.0 mg/dL Final     Bilirubin Total   Date Value Ref Range Status   2022 0.3 <=1.2 mg/dL Final     Alkaline Phosphatase   Date Value Ref Range Status   2022 109 (H) 35 - 104 U/L Final     ALT   Date Value Ref Range Status   2022 40 (H) 10 - 35 U/L Final     AST   Date Value Ref Range Status   2022 28 10 - 35 U/L Final       PATHOLOGY:  None new since prior visit.    IMAGIN2022: CT chest w/o contrast showed stable 2 mm nodule in posterior right upper lobe; no new or enlarging nodules; no lymphadenopathy.    ASSESSMENT/PLAN:  Risabecka Alcaraz is a 51 year old female with the following issues:  1.  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 Left breast DCIS  3. Chemo brain fogginess  - Risa is status post bilateral mastectomies with negative surgical margins and no lymph node involvement followed by adjuvant chemotherapy with 4 cycles of Taxotere and Cytoxan.  - Discussed that her overall prognosis is still excellent given the lower tendency for the fibromatosis low-grade type of metaplastic cancer to metastasize distantly or to the lymph nodes.  - Again discussed that surveillance following completion of chemotherapy would largely be based on history and physical exams with imaging and lab work-up reserved for any concerning signs or symptoms that could arise.  --I have written a letter for her employer that she should work remotely through at least April 2023 due to her chemo brain/brain fogginess.    4. Indeterminate pulmonary nodule  --Reviewed 11/18/2022 CT chest which showed stable 2 mm nodule in right upper lobe. Plan to repeat CT chest in 11/2023.    Return in 4 months.    Briseyda Mars MD  Hematology/Oncology  [facility]    Total time spent: 30 minutes in patient evaluation, counseling, documentation, and coordination of care.       Again, thank you for allowing me to participate in the care of your patient.        Sincerely,        Briseyda Mars MD

## 2022-12-12 NOTE — LETTER
12/12/2022         RE: Risa Alcaraz  [id number] [facility] [address]        Dear Colleague,    Thank you for referring your patient, Risa Alcaraz, to the [facility]. Please see a copy of my visit note below.    Risa is a 52 year old who is being evaluated via a billable video visit.      How would you like to obtain your AVS? MyChart  If the video visit is dropped, the invitation should be resent by: Send to e-mail at: [email or web address]  Will anyone else be joining your video visit? No     Pebbles RUBIO          Video-Visit Details    Video Start Time: 1:24 PM    Type of service:  Video Visit    Video End Time:1:41 PM    Originating Location (pt. Location): Home        Distant Location (provider location):  On-site    Platform used for Video Visit: [facility]    Hematology/Oncology Established Patient Note      Today's Date: 12/12/22    Reason for follow-up: Right breast cancer.    HISTORY OF PRESENT ILLNESS: Risa Alcaraz is a 51 year old female who presents with the following oncologic history:  1.  4/22/2022: Mammogram showed distortion in the central right breast.  Left breast is negative.  2.  5/2/2022: Diagnostic right mammogram showed distortion in the retroareolar breast.  Targeted ultrasound of the right breast at 12:00, 2 cm from the nipple measured 1.8 x 1.3 cm.  Survey of axilla showed no evidence of adenopathy.  3.  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  4. 5/24/2022: Breast MRI showed nonmass enhancement at the 12:00 position of the RIGHT breast corresponds to biopsy-proven malignancy and measures 4.3 x 2.3 x 3.2 cm. Nodular nonmass enhancement at the 12:00 position of the  LEFT breast. No evidence of adenopathy.  5. 5/25/2022: CT chest/abdomen/pelvis showed 2 mm pulmonary nodule in right upper lobe posteriorly; mildly prominent prevascular lymph node between the right brachiocephalic and left common carotid arteries measures 8 mm, upper limits of normal; no definite evidence of metastatic disease. NM bone scan showed no bone metastases.  6. 6/1/2022: Left breast U/S showed hypoechoic area measuring 2 cm at 11:00, 5 cm from nipple corresponding to MRI finding. Biopsy of left breast lesion showed nuclear grade 1 DCIS; no evidence of invasive carcinoma or LVI, ER and SC both 100% positive. After breast tumor board discussion, consensus was for patient to proceed with surgery upfront followed by adjuvant chemotherapy.  7. 6/9/2022: BRCA gene panel negative.  8. 6/15/2022: Underwent bilateral skin sparing mastectomies with bilateral axillary sentinel lymph node excision, port placement under care of Dr. Daria Sigala and reconstruction with tissue expanders with Dr. Charles Ladd.  Pathology showed 8 x 5 mm right breast invasive mammary carcinoma with features of low-grade fibromatosis like metaplastic carcinoma and without extension to margins.  Right breast tumor was multifocal with total of 3 foci: 8 mm, 3 mm, 2 mm.  Extensive DCIS, grade 1 and 2 with involvement of sclerosing adenosis and cancerization of lobules and without extension of margins.  A single right axillary lymph node is negative for metastatic disease.  Left breast showed extensive DCIS involving extensive sclerosing adenosis without extension to margins.  No evidence of invasive carcinoma in the left breast.  A single left axillary lymph node is negative for metastatic disease.  9.  7/13/2022: Started adjuvant chemotherapy with Taxotere and Cytoxan with completion on 9/14/2022.    INTERVAL HISTORY:  Risa reports memory issues, chemo-brain but this has been gradually improving. She has had trouble with recalling  destination when she has been driving.  She is able to work from home.    REVIEW OF SYSTEMS:   14 point ROS was reviewed and is negative other than as noted above in HPI.       HOME MEDICATIONS:  Current Outpatient Medications   Medication Sig Dispense Refill     multivitamin w/minerals (THERA-VIT-M) tablet Take 1 tablet by mouth daily       paragard intrauterine copper device 1 each by Intrauterine route once       vitamin C (ASCORBIC ACID) 1000 MG TABS Take 1,000 mg by mouth daily           ALLERGIES:  No Known Allergies      PAST MEDICAL HISTORY:  Past Medical History:   Diagnosis Date     Obese          PAST SURGICAL HISTORY:  Past Surgical History:   Procedure Laterality Date     BIOPSY NODE SENTINEL Bilateral 6/15/2022    Procedure: BILATERAL AXILLARY SENTINEL LYMPH NODE BIOPSIES;  Surgeon: Daria Sigala MD;  Location:  OR     INSERT PORT VASCULAR ACCESS N/A 6/15/2022    Procedure: PORT PLACEMENT;  Surgeon: Daria Sigala MD;  Location:  OR     MASTECTOMY SIMPLE BILATERAL Bilateral 6/15/2022    Procedure: BILATERAL SKIN SPARING MASTECTOMIES;  Surgeon: Daria Sigala MD;  Location:  OR     RECONSTRUCT BREAST, INSERT TISSUE EXPANDER BILATERAL, COMBINED Bilateral 6/15/2022    Procedure: BILATERAL BREAST RECONSTRUCTION WITH TISSUE EXPANDERS;  Surgeon: Charles Ladd MD;  Location:  OR     REMOVE PORT VASCULAR ACCESS N/A 10/17/2022    Procedure: PORT REMOVAL;  Surgeon: Daria Sigala MD;  Location:  OR     [facility] ORAL SURGERY PROCEDURE      wisdom teeth, hypotension with anesthesia         SOCIAL HISTORY:  Social History     Socioeconomic History     Marital status:      Spouse name: Not on file     Number of children: Not on file     Years of education: Not on file     Highest education level: Not on file   Occupational History     Employer: [employer]   Tobacco Use     Smoking status: Former     Types: Cigarettes, Cigars     Quit date:      Years since quittin.9      Smokeless tobacco: Never     Tobacco comments:     Quit 1996   Vaping Use     Vaping Use: Never used   Substance and Sexual Activity     Alcohol use: Yes     Comment: occ     Drug use: No     Sexual activity: Yes     Partners: Male     Birth control/protection: I.U.D.     Comment: mirena 2016   Other Topics Concern     Parent/sibling w/ CABG, MI or angioplasty before 65F 55M? Not Asked   Social History Narrative     Not on file     Social Determinants of Health     Financial Resource Strain: Not on file   Food Insecurity: Not on file   Transportation Needs: Not on file   Physical Activity: Not on file   Stress: Not on file   Social Connections: Not on file   Intimate Partner Violence: Not on file   Housing Stability: Not on file         FAMILY HISTORY:  Family History   Problem Relation Age of Onset     Diabetes Mother      Hypertension Mother      Cancer Father         penial     Hypertension Father      Cancer Maternal Grandmother         liver     Heart Disease Maternal Grandfather         MI     Cancer Paternal Grandmother         brain     Heart Disease Paternal Grandfather         MI         PHYSICAL EXAM:  Vital signs:  There were no vitals taken for this visit.   GENERAL: No acute distress.  EYES: No scleral icterus. No overt erythema.  RESPIRATORY: No audible cough, wheezing, or labored breathing.  MUSCULOSKELETAL: Range of motion in the neck, shoulders, and arms appear normal.  SKIN: No overt rashes, discolorations, or lesions over the face and neck. Alopecia present.  NEUROLOGIC: Alert.  No overt tremors.  PSYCHIATRIC: Normal affect and mood.  Does not appear anxious.    LABS:  CBC RESULTS: Recent Labs   Lab Test 10/14/22  1104   WBC 6.3   RBC 3.56*   HGB 12.2   HCT 37.2   *   MCH 34.3*   MCHC 32.8   RDW 14.9          Last Comprehensive Metabolic Panel:  Sodium   Date Value Ref Range Status   10/14/2022 140 136 - 145 mmol/L Final     Potassium   Date Value Ref Range Status   10/14/2022 4.6  3.4 - 5.3 mmol/L Final   2022 4.0 3.4 - 5.3 mmol/L Final     Chloride   Date Value Ref Range Status   10/14/2022 103 98 - 107 mmol/L Final   2022 112 (H) 94 - 109 mmol/L Final     Carbon Dioxide (CO2)   Date Value Ref Range Status   10/14/2022 24 22 - 29 mmol/L Final   2022 24 20 - 32 mmol/L Final     Anion Gap   Date Value Ref Range Status   10/14/2022 13 7 - 15 mmol/L Final   2022 7 3 - 14 mmol/L Final     Glucose   Date Value Ref Range Status   10/14/2022 108 (H) 70 - 99 mg/dL Final   2022 132 (H) 70 - 99 mg/dL Final   2015 95 70 - 99 mg/dL Final     Comment:     Effective 2014, the reference range for this assay has changed to reflect   new instrumentation/methodology.       Urea Nitrogen   Date Value Ref Range Status   10/14/2022 13.8 6.0 - 20.0 mg/dL Final   2022 15 7 - 30 mg/dL Final     Creatinine   Date Value Ref Range Status   10/14/2022 0.80 0.51 - 0.95 mg/dL Final     GFR Estimate   Date Value Ref Range Status   10/14/2022 90 >60 mL/min/1.73m2 Final     Comment:     Effective 2021 eGFRcr in adults is calculated using the  CKD-EPI creatinine equation which includes age and gender (Luh et al., NEJ, DOI: 10.1056/WOUJbr4350243)     Calcium   Date Value Ref Range Status   10/14/2022 9.6 8.6 - 10.0 mg/dL Final     Bilirubin Total   Date Value Ref Range Status   2022 0.3 <=1.2 mg/dL Final     Alkaline Phosphatase   Date Value Ref Range Status   2022 109 (H) 35 - 104 U/L Final     ALT   Date Value Ref Range Status   2022 40 (H) 10 - 35 U/L Final     AST   Date Value Ref Range Status   2022 28 10 - 35 U/L Final       PATHOLOGY:  None new since prior visit.    IMAGIN2022: CT chest w/o contrast showed stable 2 mm nodule in posterior right upper lobe; no new or enlarging nodules; no lymphadenopathy.    ASSESSMENT/PLAN:  Risabecka Alcaraz is a 51 year old female with the following issues:  1.  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 Left breast DCIS  3. Chemo brain fogginess  - Risa is status post bilateral mastectomies with negative surgical margins and no lymph node involvement followed by adjuvant chemotherapy with 4 cycles of Taxotere and Cytoxan.  - Discussed that her overall prognosis is still excellent given the lower tendency for the fibromatosis low-grade type of metaplastic cancer to metastasize distantly or to the lymph nodes.  - Again discussed that surveillance following completion of chemotherapy would largely be based on history and physical exams with imaging and lab work-up reserved for any concerning signs or symptoms that could arise.  --I have written a letter for her employer that she should work remotely through at least April 2023 due to her chemo brain/brain fogginess.    4. Indeterminate pulmonary nodule  --Reviewed 11/18/2022 CT chest which showed stable 2 mm nodule in right upper lobe. Plan to repeat CT chest in 11/2023.    Return in 4 months.    Briseyda Mars MD  Hematology/Oncology  [facility]    Total time spent: 30 minutes in patient evaluation, counseling, documentation, and coordination of care.       Again, thank you for allowing me to participate in the care of your patient.        Sincerely,        Briseyda Mars MD

## 2022-12-14 ENCOUNTER — TELEPHONE (OUTPATIENT)
Dept: ONCOLOGY | Facility: CLINIC | Age: 52
End: 2022-12-14

## 2022-12-16 ENCOUNTER — OFFICE VISIT (OUTPATIENT)
Dept: FAMILY MEDICINE | Facility: CLINIC | Age: 52
End: 2022-12-16
Payer: COMMERCIAL

## 2022-12-16 VITALS
WEIGHT: 213.6 LBS | HEART RATE: 81 BPM | HEIGHT: 63 IN | DIASTOLIC BLOOD PRESSURE: 86 MMHG | SYSTOLIC BLOOD PRESSURE: 120 MMHG | BODY MASS INDEX: 37.85 KG/M2 | OXYGEN SATURATION: 98 % | TEMPERATURE: 98.6 F | RESPIRATION RATE: 20 BRPM

## 2022-12-16 DIAGNOSIS — C50.919 METAPLASTIC CARCINOMA OF BREAST (H): ICD-10-CM

## 2022-12-16 DIAGNOSIS — Z12.11 SCREEN FOR COLON CANCER: ICD-10-CM

## 2022-12-16 DIAGNOSIS — D05.12 DUCTAL CARCINOMA IN SITU (DCIS) OF LEFT BREAST: ICD-10-CM

## 2022-12-16 DIAGNOSIS — Z01.818 PREOP GENERAL PHYSICAL EXAM: Primary | ICD-10-CM

## 2022-12-16 LAB — HCG UR QL: NEGATIVE

## 2022-12-16 PROCEDURE — 99214 OFFICE O/P EST MOD 30 MIN: CPT | Performed by: NURSE PRACTITIONER

## 2022-12-16 PROCEDURE — 81025 URINE PREGNANCY TEST: CPT | Performed by: NURSE PRACTITIONER

## 2022-12-16 ASSESSMENT — PAIN SCALES - GENERAL: PAINLEVEL: NO PAIN (0)

## 2022-12-16 NOTE — PROGRESS NOTES
[facility]  [address] [place] [address]  Phone: [phone]  Fax: [phone]  Primary Provider: [facility]  Pre-op Performing Provider: TRAVIS URENA      PREOPERATIVE EVALUATION:  Today's date: 12/16/2022    Risa Alcaraz is a 52 year old female who presents for a preoperative evaluation.    Surgical Information:  Surgery/Procedure: Breast reconstruction, bilateral  Surgery Location: [facility] in [place], [place]  Surgeon: Dr. Ladd  Surgery Date: 12/23/2022  Time of Surgery: 5:00 AM  Where patient plans to recover: At home with family  Fax number for surgical facility: [phone]    Type of Anesthesia Anticipated: Choice    Assessment & Plan     The proposed surgical procedure is considered INTERMEDIATE risk.    Preop general physical exam  Ductal carcinoma in situ (DCIS) of left breast  Metaplastic carcinoma of breast (H)  S/p bilateral mastectomy with spacer  Chemotherapy completed  Labs current   No need for EKG today   - HCG Qual, Urine ([id number])    Screen for colon cancer  Due and will do early next year.  Order placed.   - Colonoscopy Screening  Referral        Risks and Recommendations:  The patient has the following additional risks and recommendations for perioperative complications:   - No identified additional risk factors other than previously addressed        RECOMMENDATION:  APPROVAL GIVEN to proceed with proposed procedure, without further diagnostic evaluation.    Call or return to the clinic with any worsening of symptoms or no resolution. Patient/Parent verbalized understanding and is in agreement. Medication side effects reviewed.   Current Outpatient Medications   Medication Sig Dispense Refill     multivitamin w/minerals (THERA-VIT-M) tablet Take 1 tablet by mouth daily       vitamin C (ASCORBIC ACID) 1000 MG TABS Take 1,000 mg by mouth daily       paragard intrauterine copper device 1  each by Intrauterine route once       Chart documentation with Dragon Voice recognition Software. Although reviewed after completion, some words and grammatical errors may remain.  Janet Yap MSN,FNP-[address] [address] [phone]          Subjective     HPI related to upcoming procedure:   Breast cancer right and left side diagnosed after mammogram in April  Bilateral mastectomy with spacer placement 6/2022 and chemotherapy completed 9/14/2022  Port removed 10/17/2022    No LMP since July  Pregnancy test negative   Has IUD in place.   Recent labs done through oncology  No recent illness    Preop Questions 12/16/2022   1. Have you ever had a heart attack or stroke? No   2. Have you ever had surgery on your heart or blood vessels, such as a stent placement, a coronary artery bypass, or surgery on an artery in your head, neck, heart, or legs? No   3. Do you have chest pain with activity? No   4. Do you have a history of  heart failure? No   5. Do you currently have a cold, bronchitis or symptoms of other infection? No   6. Do you have a cough, shortness of breath, or wheezing? No   7. Do you or anyone in your family have previous history of blood clots? No   8. Do you or does anyone in your family have a serious bleeding problem such as prolonged bleeding following surgeries or cuts? No   9. Have you ever had problems with anemia or been told to take iron pills? No   10. Have you had any abnormal blood loss such as black, tarry or bloody stools, or abnormal vaginal bleeding? No   11. Have you ever had a blood transfusion? No   12. Are you willing to have a blood transfusion if it is medically needed before, during, or after your surgery? Yes   13. Have you or any of your relatives ever had problems with anesthesia? No   14. Do you have sleep apnea, excessive snoring or daytime drowsiness? No   15. Do you have any artifical heart valves or other  implanted medical devices like a pacemaker, defibrillator, or continuous glucose monitor? No   16. Do you have artificial joints? No   17. Are you allergic to latex? No   18. Is there any chance that you may be pregnant? No       Health Care Directive:  Patient does not have a Health Care Directive or Living Will:     Preoperative Review of :   reviewed - no record of controlled substances prescribed.          Review of Systems  Constitutional, neuro, ENT, endocrine, pulmonary, cardiac, gastrointestinal, genitourinary, musculoskeletal, integument and psychiatric systems are negative, except as otherwise noted.    Patient Active Problem List    Diagnosis Date Noted     Ductal carcinoma in situ (DCIS) of left breast 07/12/2022     Priority: Medium     Malignant neoplasm of central portion of right breast in female, estrogen receptor negative (H) 06/29/2022     Priority: Medium     Complete prior to 12.12 Mars       Metaplastic carcinoma of breast (H) 06/15/2022     Priority: Medium     IUD (intrauterine device) in place 12/17/2016     Priority: Medium     Obesity 04/03/2014     Priority: Medium     CARDIOVASCULAR SCREENING; LDL GOAL LESS THAN 160 10/31/2010     Priority: Medium     Allergic rhinitis 03/15/2006     Priority: Medium     Problem list name updated by automated process. Provider to review        Past Medical History:   Diagnosis Date     Obese      Past Surgical History:   Procedure Laterality Date     BIOPSY NODE SENTINEL Bilateral 6/15/2022    Procedure: BILATERAL AXILLARY SENTINEL LYMPH NODE BIOPSIES;  Surgeon: Daria Sigala MD;  Location:  OR     INSERT PORT VASCULAR ACCESS N/A 6/15/2022    Procedure: PORT PLACEMENT;  Surgeon: Daria Sigala MD;  Location:  OR     MASTECTOMY SIMPLE BILATERAL Bilateral 6/15/2022    Procedure: BILATERAL SKIN SPARING MASTECTOMIES;  Surgeon: Daria Sigala MD;  Location:  OR     RECONSTRUCT BREAST, INSERT TISSUE EXPANDER BILATERAL, COMBINED Bilateral  "6/15/2022    Procedure: BILATERAL BREAST RECONSTRUCTION WITH TISSUE EXPANDERS;  Surgeon: Charles Ladd MD;  Location:  OR     REMOVE PORT VASCULAR ACCESS N/A 10/17/2022    Procedure: PORT REMOVAL;  Surgeon: Daria Sigala MD;  Location:  OR     [facility] ORAL SURGERY PROCEDURE      wisdom teeth, hypotension with anesthesia     Current Outpatient Medications   Medication Sig Dispense Refill     multivitamin w/minerals (THERA-VIT-M) tablet Take 1 tablet by mouth daily       vitamin C (ASCORBIC ACID) 1000 MG TABS Take 1,000 mg by mouth daily       paragard intrauterine copper device 1 each by Intrauterine route once         No Known Allergies     Social History     Tobacco Use     Smoking status: Former     Types: Cigarettes, Cigars     Quit date:      Years since quittin.9     Smokeless tobacco: Never     Tobacco comments:     Quit    Substance Use Topics     Alcohol use: Yes     Comment: occ           Objective     /86 (BP Location: Right arm, Patient Position: Sitting, Cuff Size: Adult Regular)   Pulse 81   Temp 98.6  F (37  C) (Tympanic)   Resp 20   Ht 1.6 m (5' 3\")   Wt 96.9 kg (213 lb 9.6 oz)   LMP  (LMP Unknown)   SpO2 98%   BMI 37.84 kg/m      Physical Exam    GENERAL APPEARANCE: healthy, alert and no distress     EYES: EOMI, PERRL     HENT: ear canals and TM's normal and nose and mouth without ulcers or lesions     NECK: no adenopathy, no asymmetry, masses, or scars and thyroid normal to palpation     RESP: lungs clear to auscultation - no rales, rhonchi or wheezes     CV: regular rates and rhythm, normal S1 S2, no S3 or S4 and no murmur, click or rub     MS: extremities normal- no gross deformities noted, no evidence of inflammation in joints, FROM in all extremities.     SKIN: no suspicious lesions or rashes     NEURO: Normal strength and tone, sensory exam grossly normal, mentation intact and speech normal     PSYCH: mentation appears normal. and affect normal/bright   "   LYMPHATICS: No cervical adenopathy    Recent Labs   Lab Test 12/06/22  0748 10/14/22  1104   HGB 13.7 12.2    292    140   POTASSIUM 3.6 4.6   CR 0.87 0.80        Diagnostics:  Recent Results (from the past 720 hour(s))   Comprehensive metabolic panel (BMP + Alb, Alk Phos, ALT, AST, Total. Bili, TP)    Collection Time: 12/06/22  7:48 AM   Result Value Ref Range    Sodium 142 136 - 145 mmol/L    Potassium 3.6 3.4 - 5.3 mmol/L    Chloride 105 98 - 107 mmol/L    Carbon Dioxide (CO2) 30 (H) 22 - 29 mmol/L    Anion Gap 7 7 - 15 mmol/L    Urea Nitrogen 13.2 6.0 - 20.0 mg/dL    Creatinine 0.87 0.51 - 0.95 mg/dL    Calcium 9.5 8.6 - 10.0 mg/dL    Glucose 118 (H) 70 - 99 mg/dL    Alkaline Phosphatase 116 (H) 35 - 104 U/L    AST 27 10 - 35 U/L    ALT 25 10 - 35 U/L    Protein Total 6.8 6.4 - 8.3 g/dL    Albumin 4.1 3.5 - 5.2 g/dL    Bilirubin Total 0.7 <=1.2 mg/dL    GFR Estimate 81 >60 mL/min/1.73m2   CBC with platelets and differential    Collection Time: 12/06/22  7:48 AM   Result Value Ref Range    WBC Count 4.8 4.0 - 11.0 10e3/uL    RBC Count 4.18 3.80 - 5.20 10e6/uL    Hemoglobin 13.7 11.7 - 15.7 g/dL    Hematocrit 41.7 35.0 - 47.0 %     78 - 100 fL    MCH 32.8 26.5 - 33.0 pg    MCHC 32.9 31.5 - 36.5 g/dL    RDW 12.0 10.0 - 15.0 %    Platelet Count 229 150 - 450 10e3/uL    % Neutrophils 59 %    % Lymphocytes 26 %    % Monocytes 7 %    % Eosinophils 8 %    % Basophils 1 %    % Immature Granulocytes 0 %    Absolute Neutrophils 2.8 1.6 - 8.3 10e3/uL    Absolute Lymphocytes 1.3 0.8 - 5.3 10e3/uL    Absolute Monocytes 0.3 0.0 - 1.3 10e3/uL    Absolute Eosinophils 0.4 0.0 - 0.7 10e3/uL    Absolute Basophils 0.0 0.0 - 0.2 10e3/uL    Absolute Immature Granulocytes 0.0 <=0.4 10e3/uL      No EKG required, no history of coronary heart disease, significant arrhythmia, peripheral arterial disease or other structural heart disease.    Revised Cardiac Risk Index (RCRI):  The patient has the following serious  cardiovascular risks for perioperative complications:   - No serious cardiac risks = 0 points            Signed Electronically by: JIMBO Diaz CNP  Copy of this evaluation report is provided to requesting physician.

## 2022-12-16 NOTE — PATIENT INSTRUCTIONS
For informational purposes only. Not to replace the advice of your health care provider. Copyright   2003,  [facility]. All rights reserved. Clinically reviewed by Jacquie Stern MD. [email or web address] [id number] - REV .  Preparing for Your Surgery  Getting started  A nurse will call you to review your health history and instructions. They will give you an arrival time based on your scheduled surgery time. Please be ready to share:    Your doctor's clinic name and phone number    Your medical, surgical, and anesthesia history    A list of allergies and sensitivities    A list of medicines, including herbal treatments and over-the-counter drugs    Whether the patient has a legal guardian (ask how to send us the papers in advance)  Please tell us if you're pregnant--or if there's any chance you might be pregnant. Some surgeries may injure a fetus (unborn baby), so they require a pregnancy test. Surgeries that are safe for a fetus don't always need a test, and you can choose whether to have one.   If you have a child who's having surgery, please ask for a copy of Preparing for Your Child's Surgery.    Preparing for surgery    Within 10 to 30 days of surgery: Have a pre-op exam (sometimes called an H&P, or History and Physical). This can be done at a clinic or pre-operative center.  ? If you're having a , you may not need this exam. Talk to your care team.    At your pre-op exam, talk to your care team about all medicines you take. If you need to stop any medicines before surgery, ask when to start taking them again.  ? We do this for your safety. Many medicines can make you bleed too much during surgery. Some change how well surgery (anesthesia) drugs work.    Call your insurance company to let them know you're having surgery. (If you don't have insurance, call [phone].)    Call your clinic if there's any change in your health. This includes signs of a cold or flu (sore throat, runny nose,  cough, rash, fever). It also includes a scrape or scratch near the surgery site.    If you have questions on the day of surgery, call your hospital or surgery center.  Eating and drinking guidelines  For your safety: Unless your surgeon tells you otherwise, follow the guidelines below.    Eat and drink as usual until 8 hours before you arrive for surgery. After that, no food or milk.    Drink clear liquids until 2 hours before you arrive. These are liquids you can see through, like water, Gatorade, and Propel Water. They also include plain black coffee and tea (no cream or milk), candy, and breath mints. You can spit out gum when you arrive.    If you drink alcohol: Stop drinking it the night before surgery.    If your care team tells you to take medicine on the morning of surgery, it's okay to take it with a sip of water.  Preventing infection    Shower or bathe the night before and morning of your surgery. Follow the instructions your clinic gave you. (If no instructions, use regular soap.)    Don't shave or clip hair near your surgery site. We'll remove the hair if needed.    Don't smoke or vape the morning of surgery. You may chew nicotine gum up to 2 hours before surgery. A nicotine patch is okay.  ? Note: Some surgeries require you to completely quit smoking and nicotine. Check with your surgeon.    Your care team will make every effort to keep you safe from infection. We will:  ? Clean our hands often with soap and water (or an alcohol-based hand rub).  ? Clean the skin at your surgery site with a special soap that kills germs.  ? Give you a special gown to keep you warm. (Cold raises the risk of infection.)  ? Wear special hair covers, masks, gowns and gloves during surgery.  ? Give antibiotic medicine, if prescribed. Not all surgeries need antibiotics.  What to bring on the day of surgery    Photo ID and insurance card    Copy of your health care directive, if you have one    Glasses and hearing aids (bring  cases)  ? You can't wear contacts during surgery    Inhaler and eye drops, if you use them (tell us about these when you arrive)    CPAP machine or breathing device, if you use them    A few personal items, if spending the night    If you have . . .  ? A pacemaker, ICD (cardiac defibrillator) or other implant: Bring the ID card.  ? An implanted stimulator: Bring the remote control.  ? A legal guardian: Bring a copy of the certified (court-stamped) guardianship papers.  Please remove any jewelry, including body piercings. Leave jewelry and other valuables at home.  If you're going home the day of surgery    You must have a responsible adult drive you home. They should stay with you overnight as well.    If you don't have someone to stay with you, and you aren't safe to go home alone, we may keep you overnight. Insurance often won't pay for this.  After surgery  If it's hard to control your pain or you need more pain medicine, please call your surgeon's office.  Questions?   If you have any questions for your care team, list them here: _________________________________________________________________________________________________________________________________________________________________________ ____________________________________ ____________________________________ ____________________________________

## 2023-01-02 ENCOUNTER — DOCUMENTATION ONLY (OUTPATIENT)
Dept: ONCOLOGY | Facility: CLINIC | Age: 53
End: 2023-01-02

## 2023-01-02 ENCOUNTER — MYC MEDICAL ADVICE (OUTPATIENT)
Dept: ONCOLOGY | Facility: CLINIC | Age: 53
End: 2023-01-02

## 2023-01-02 NOTE — NURSING NOTE
FMLA forms completed, signed and emailed to [email or web address]    Pt notified.    Petrona Gonzalez RN

## 2023-04-11 NOTE — PROGRESS NOTES
[facility]    Hematology/Oncology Established Patient Note      Today's Date: 4/17/2023    Reason for follow-up: Right breast cancer.    HISTORY OF PRESENT ILLNESS: Risa Alcaraz is a 52 year old female who presents with the following oncologic history:  1.  4/22/2022: Mammogram showed distortion in the central right breast.  Left breast is negative.  2.  5/2/2022: Diagnostic right mammogram showed distortion in the retroareolar breast.  Targeted ultrasound of the right breast at 12:00, 2 cm from the nipple measured 1.8 x 1.3 cm.  Survey of axilla showed no evidence of adenopathy.  3.  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  4. 5/24/2022: Breast MRI showed nonmass enhancement at the 12:00 position of the RIGHT breast corresponds to biopsy-proven malignancy and measures 4.3 x 2.3 x 3.2 cm. Nodular nonmass enhancement at the 12:00 position of the LEFT breast. No evidence of adenopathy.  5. 5/25/2022: CT chest/abdomen/pelvis showed 2 mm pulmonary nodule in right upper lobe posteriorly; mildly prominent prevascular lymph node between the right brachiocephalic and left common carotid arteries measures 8 mm, upper limits of normal; no definite evidence of metastatic disease. NM bone scan showed no bone metastases.  6. 6/1/2022: Left breast U/S showed hypoechoic area measuring 2 cm at 11:00, 5 cm from nipple corresponding to MRI finding. Biopsy of left breast lesion showed nuclear grade 1 DCIS; no evidence of invasive carcinoma or LVI, ER and UT both 100% positive. After breast tumor board discussion, consensus was for patient to proceed with surgery upfront followed by adjuvant chemotherapy.  7. 6/9/2022: BRCA gene panel negative.  8. 6/15/2022: Underwent  bilateral skin sparing mastectomies with bilateral axillary sentinel lymph node excision, port placement under care of Dr. Daria Sigala and reconstruction with tissue expanders with Dr. Charles Ladd.  Pathology showed 8 x 5 mm right breast invasive mammary carcinoma with features of low-grade fibromatosis like metaplastic carcinoma and without extension to margins.  Right breast tumor was multifocal with total of 3 foci: 8 mm, 3 mm, 2 mm.  Extensive DCIS, grade 1 and 2 with involvement of sclerosing adenosis and cancerization of lobules and without extension of margins.  A single right axillary lymph node is negative for metastatic disease.  Left breast showed extensive DCIS involving extensive sclerosing adenosis without extension to margins.  No evidence of invasive carcinoma in the left breast.  A single left axillary lymph node is negative for metastatic disease.  9.  7/13/2022: Started adjuvant chemotherapy with Taxotere and Cytoxan with completion on 9/14/2022.    INTERVAL HISTORY:  Risa reports bone/joint pain bilaterally from her knees and lower through ankles since she received chemotherapy. She reports her memory has improved quite a bit.    REVIEW OF SYSTEMS:   14 point ROS was reviewed and is negative other than as noted above in HPI.       HOME MEDICATIONS:  Current Outpatient Medications   Medication Sig Dispense Refill     multivitamin w/minerals (THERA-VIT-M) tablet Take 1 tablet by mouth daily       paragard intrauterine copper device 1 each by Intrauterine route once       vitamin C (ASCORBIC ACID) 1000 MG TABS Take 1,000 mg by mouth daily           ALLERGIES:  No Known Allergies      PAST MEDICAL HISTORY:  Past Medical History:   Diagnosis Date     Obese          PAST SURGICAL HISTORY:  Past Surgical History:   Procedure Laterality Date     BIOPSY NODE SENTINEL Bilateral 6/15/2022    Procedure: BILATERAL AXILLARY SENTINEL LYMPH NODE BIOPSIES;  Surgeon: Daria Sigala MD;  Location:  OR      INSERT PORT VASCULAR ACCESS N/A 6/15/2022    Procedure: PORT PLACEMENT;  Surgeon: Daria Sigala MD;  Location:  OR     MASTECTOMY SIMPLE BILATERAL Bilateral 6/15/2022    Procedure: BILATERAL SKIN SPARING MASTECTOMIES;  Surgeon: Daria Sigala MD;  Location:  OR     RECONSTRUCT BREAST, INSERT TISSUE EXPANDER BILATERAL, COMBINED Bilateral 6/15/2022    Procedure: BILATERAL BREAST RECONSTRUCTION WITH TISSUE EXPANDERS;  Surgeon: Charles Ladd MD;  Location:  OR     REMOVE PORT VASCULAR ACCESS N/A 10/17/2022    Procedure: PORT REMOVAL;  Surgeon: Daria Sigala MD;  Location:  OR     ZZC ORAL SURGERY PROCEDURE      wisdom teeth, hypotension with anesthesia         SOCIAL HISTORY:  Social History     Socioeconomic History     Marital status:      Spouse name: Not on file     Number of children: Not on file     Years of education: Not on file     Highest education level: Not on file   Occupational History     Employer: [employer]   Tobacco Use     Smoking status: Former     Types: Cigarettes, Cigars     Quit date:      Years since quittin.2     Smokeless tobacco: Never     Tobacco comments:     Quit    Vaping Use     Vaping status: Never Used     Passive vaping exposure: Yes   Substance and Sexual Activity     Alcohol use: Yes     Comment: occ     Drug use: No     Sexual activity: Yes     Partners: Male     Birth control/protection: I.U.D.     Comment: mirena    Other Topics Concern     Parent/sibling w/ CABG, MI or angioplasty before 65F 55M? Not Asked   Social History Narrative     Not on file     Social Determinants of Health     Financial Resource Strain: Not on file   Food Insecurity: Not on file   Transportation Needs: Not on file   Physical Activity: Not on file   Stress: Not on file   Social Connections: Not on file   Intimate Partner Violence: Not on file   Housing Stability: Not on file         FAMILY HISTORY:  Family History   Problem Relation Age of Onset      "Diabetes Mother      Hypertension Mother      Cancer Father         penial     Hypertension Father      Cancer Maternal Grandmother         liver     Heart Disease Maternal Grandfather         MI     Cancer Paternal Grandmother         brain     Heart Disease Paternal Grandfather         MI         PHYSICAL EXAM:  Vital signs:  /83   Pulse 72   Resp 16   Ht 1.6 m (5' 3\")   Wt 101.3 kg (223 lb 6.4 oz)   SpO2 98%   BMI 39.57 kg/m     GENERAL: No acute distress.  EYES: No scleral icterus. No overt erythema.  BREAST: Bilateral breasts are surgically absent with no chest wall masses.  RESPIRATORY: No audible cough, wheezing, or labored breathing.  MUSCULOSKELETAL: Range of motion in the neck, shoulders, and arms appear normal.  SKIN: No overt rashes, discolorations, or lesions over the face and neck. Alopecia present.  NEUROLOGIC: Alert.  No overt tremors.  PSYCHIATRIC: Normal affect and mood.  Does not appear anxious.    LABS:  CBC RESULTS: Recent Labs   Lab Test 10/14/22  1104   WBC 6.3   RBC 3.56*   HGB 12.2   HCT 37.2   *   MCH 34.3*   MCHC 32.8   RDW 14.9          Last Comprehensive Metabolic Panel:  Sodium   Date Value Ref Range Status   12/06/2022 142 136 - 145 mmol/L Final     Potassium   Date Value Ref Range Status   12/06/2022 3.6 3.4 - 5.3 mmol/L Final   08/24/2022 4.0 3.4 - 5.3 mmol/L Final     Chloride   Date Value Ref Range Status   12/06/2022 105 98 - 107 mmol/L Final   08/24/2022 112 (H) 94 - 109 mmol/L Final     Carbon Dioxide (CO2)   Date Value Ref Range Status   12/06/2022 30 (H) 22 - 29 mmol/L Final   08/24/2022 24 20 - 32 mmol/L Final     Anion Gap   Date Value Ref Range Status   12/06/2022 7 7 - 15 mmol/L Final   08/24/2022 7 3 - 14 mmol/L Final     Glucose   Date Value Ref Range Status   12/06/2022 118 (H) 70 - 99 mg/dL Final   08/24/2022 132 (H) 70 - 99 mg/dL Final   01/30/2015 95 70 - 99 mg/dL Final     Comment:     Effective 7/30/2014, the reference range for this assay " has changed to reflect   new instrumentation/methodology.       Urea Nitrogen   Date Value Ref Range Status   2022 13.2 6.0 - 20.0 mg/dL Final   2022 15 7 - 30 mg/dL Final     Creatinine   Date Value Ref Range Status   2022 0.87 0.51 - 0.95 mg/dL Final     GFR Estimate   Date Value Ref Range Status   2022 81 >60 mL/min/1.73m2 Final     Comment:     Effective 2021 eGFRcr in adults is calculated using the  CKD-EPI creatinine equation which includes age and gender (Luh et al., NEJ, DOI: 10.1056/EQZOqh5002356)     Calcium   Date Value Ref Range Status   2022 9.5 8.6 - 10.0 mg/dL Final     Bilirubin Total   Date Value Ref Range Status   2022 0.7 <=1.2 mg/dL Final     Alkaline Phosphatase   Date Value Ref Range Status   2022 116 (H) 35 - 104 U/L Final     ALT   Date Value Ref Range Status   2022 25 10 - 35 U/L Final     AST   Date Value Ref Range Status   2022 27 10 - 35 U/L Final       PATHOLOGY:  None new since prior visit.    IMAGIN2022: CT chest w/o contrast showed stable 2 mm nodule in posterior right upper lobe; no new or enlarging nodules; no lymphadenopathy.    ASSESSMENT/PLAN:  Risa Alcaraz is a 52 year old female with the following issues:  1.  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 Left breast DCIS  3. Chemo brain fogginess, improved  - Risa is status post bilateral mastectomies with negative surgical margins and no lymph node involvement followed by adjuvant chemotherapy with 4 cycles of Taxotere and Cytoxan.  - Discussed that her overall prognosis is still excellent given the lower tendency for the fibromatosis low-grade type of metaplastic cancer to metastasize distantly or to the lymph nodes.  --She has no clinical evidence for recurrent breast cancer by physical exam from  today.  - Again discussed that surveillance following completion of chemotherapy would largely be based on history and physical exams with imaging and lab work-up reserved for any concerning signs or symptoms that could arise.     4. Indeterminate pulmonary nodule  --Reviewed 11/18/2022 CT chest which showed stable 2 mm nodule in right upper lobe. Plan to repeat CT chest in 11/2023.    5. Bone/joint pain  --Likely related to past chemotherapy.  --Anticipate this will subside over time, several months.  --Discussed she can try curcumin supplements, continue calcium/vitamin D supplements.    Return in 4 months.    Briseyda Mars MD  Hematology/Oncology  [facility]    Total time spent: 30 minutes in patient evaluation, counseling, documentation, and coordination of care.

## 2023-04-17 ENCOUNTER — DOCUMENTATION ONLY (OUTPATIENT)
Dept: ONCOLOGY | Facility: CLINIC | Age: 53
End: 2023-04-17

## 2023-04-17 ENCOUNTER — ONCOLOGY VISIT (OUTPATIENT)
Dept: ONCOLOGY | Facility: CLINIC | Age: 53
End: 2023-04-17
Attending: INTERNAL MEDICINE
Payer: COMMERCIAL

## 2023-04-17 VITALS
OXYGEN SATURATION: 98 % | BODY MASS INDEX: 39.58 KG/M2 | DIASTOLIC BLOOD PRESSURE: 83 MMHG | WEIGHT: 223.4 LBS | HEIGHT: 63 IN | SYSTOLIC BLOOD PRESSURE: 138 MMHG | HEART RATE: 72 BPM | RESPIRATION RATE: 16 BRPM

## 2023-04-17 DIAGNOSIS — C50.111 MALIGNANT NEOPLASM OF CENTRAL PORTION OF RIGHT BREAST IN FEMALE, ESTROGEN RECEPTOR NEGATIVE (H): Primary | ICD-10-CM

## 2023-04-17 DIAGNOSIS — D05.12 DUCTAL CARCINOMA IN SITU (DCIS) OF LEFT BREAST: ICD-10-CM

## 2023-04-17 DIAGNOSIS — M25.50 POLYARTHRALGIA: ICD-10-CM

## 2023-04-17 DIAGNOSIS — Z17.1 MALIGNANT NEOPLASM OF CENTRAL PORTION OF RIGHT BREAST IN FEMALE, ESTROGEN RECEPTOR NEGATIVE (H): Primary | ICD-10-CM

## 2023-04-17 DIAGNOSIS — R91.8 PULMONARY NODULES: ICD-10-CM

## 2023-04-17 PROCEDURE — 99212 OFFICE O/P EST SF 10 MIN: CPT | Performed by: INTERNAL MEDICINE

## 2023-04-17 PROCEDURE — 99214 OFFICE O/P EST MOD 30 MIN: CPT | Performed by: INTERNAL MEDICINE

## 2023-04-17 ASSESSMENT — PAIN SCALES - GENERAL: PAINLEVEL: NO PAIN (0)

## 2023-04-17 NOTE — NURSING NOTE
Return to work letter signed and emailed to Risa at [email or web address]    Petrona Gonzalez RN

## 2023-04-17 NOTE — PROGRESS NOTES
"Oncology Rooming Note    April 17, 2023 10:15 AM   Risa Alcaraz is a 52 year old female who presents for:    Chief Complaint   Patient presents with     Oncology Clinic Visit     Initial Vitals: There were no vitals taken for this visit. Estimated body mass index is 37.84 kg/m  as calculated from the following:    Height as of 12/16/22: 1.6 m (5' 3\").    Weight as of 12/16/22: 96.9 kg (213 lb 9.6 oz). There is no height or weight on file to calculate BSA.  Data Unavailable Comment: Data Unavailable   No LMP recorded. (Menstrual status: IUD).  Allergies reviewed: Yes  Medications reviewed: Yes    Medications: Medication refills not needed today.  Pharmacy name entered into [facility]:    [facility] [place] [place] - [address] [facility], [place] - [address] [place] [address]    Clinical concerns:  doctor was notified.      Toshia Royal MA            "

## 2023-04-17 NOTE — LETTER
4/17/2023         RE: Risa Alcaraz  [id number] [facility] [id number]        Dear Colleague,    Thank you for referring your patient, Risa Alcaraz, to the [facility]. Please see a copy of my visit note below.    [facility]    Hematology/Oncology Established Patient Note      Today's Date: 4/17/2023    Reason for follow-up: Right breast cancer.    HISTORY OF PRESENT ILLNESS: Risa Alcaraz is a 52 year old female who presents with the following oncologic history:  1.  4/22/2022: Mammogram showed distortion in the central right breast.  Left breast is negative.  2.  5/2/2022: Diagnostic right mammogram showed distortion in the retroareolar breast.  Targeted ultrasound of the right breast at 12:00, 2 cm from the nipple measured 1.8 x 1.3 cm.  Survey of axilla showed no evidence of adenopathy.  3.  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  4. 5/24/2022: Breast MRI showed nonmass enhancement at the 12:00 position of the RIGHT breast corresponds to biopsy-proven malignancy and measures 4.3 x 2.3 x 3.2 cm. Nodular nonmass enhancement at the 12:00 position of the LEFT breast. No evidence of adenopathy.  5. 5/25/2022: CT chest/abdomen/pelvis showed 2 mm pulmonary nodule in right upper lobe posteriorly; mildly prominent prevascular lymph node between the right brachiocephalic and left common carotid arteries measures 8 mm, upper limits of normal; no definite evidence of metastatic disease. NM bone scan showed no bone metastases.  6. 6/1/2022: Left breast U/S showed hypoechoic area measuring 2 cm at 11:00, 5 cm from nipple corresponding to MRI finding. Biopsy of left breast lesion showed nuclear grade 1 DCIS; no evidence of  invasive carcinoma or LVI, ER and OK both 100% positive. After breast tumor board discussion, consensus was for patient to proceed with surgery upfront followed by adjuvant chemotherapy.  7. 6/9/2022: BRCA gene panel negative.  8. 6/15/2022: Underwent bilateral skin sparing mastectomies with bilateral axillary sentinel lymph node excision, port placement under care of Dr. Daria Sigala and reconstruction with tissue expanders with Dr. Charles Ladd.  Pathology showed 8 x 5 mm right breast invasive mammary carcinoma with features of low-grade fibromatosis like metaplastic carcinoma and without extension to margins.  Right breast tumor was multifocal with total of 3 foci: 8 mm, 3 mm, 2 mm.  Extensive DCIS, grade 1 and 2 with involvement of sclerosing adenosis and cancerization of lobules and without extension of margins.  A single right axillary lymph node is negative for metastatic disease.  Left breast showed extensive DCIS involving extensive sclerosing adenosis without extension to margins.  No evidence of invasive carcinoma in the left breast.  A single left axillary lymph node is negative for metastatic disease.  9. 7/13/2022: Started adjuvant chemotherapy with Taxotere and Cytoxan with completion on 9/14/2022.    INTERVAL HISTORY:  Risa reports bone/joint pain bilaterally from her knees and lower through ankles since she received chemotherapy. She reports her memory has improved quite a bit.    REVIEW OF SYSTEMS:   14 point ROS was reviewed and is negative other than as noted above in HPI.       HOME MEDICATIONS:  Current Outpatient Medications   Medication Sig Dispense Refill     multivitamin w/minerals (THERA-VIT-M) tablet Take 1 tablet by mouth daily       paragard intrauterine copper device 1 each by Intrauterine route once       vitamin C (ASCORBIC ACID) 1000 MG TABS Take 1,000 mg by mouth daily           ALLERGIES:  No Known Allergies      PAST MEDICAL HISTORY:  Past Medical History:   Diagnosis Date      Obese          PAST SURGICAL HISTORY:  Past Surgical History:   Procedure Laterality Date     BIOPSY NODE SENTINEL Bilateral 6/15/2022    Procedure: BILATERAL AXILLARY SENTINEL LYMPH NODE BIOPSIES;  Surgeon: Daria Sigala MD;  Location:  OR     INSERT PORT VASCULAR ACCESS N/A 6/15/2022    Procedure: PORT PLACEMENT;  Surgeon: Daria Sigala MD;  Location:  OR     MASTECTOMY SIMPLE BILATERAL Bilateral 6/15/2022    Procedure: BILATERAL SKIN SPARING MASTECTOMIES;  Surgeon: Daria Sigala MD;  Location:  OR     RECONSTRUCT BREAST, INSERT TISSUE EXPANDER BILATERAL, COMBINED Bilateral 6/15/2022    Procedure: BILATERAL BREAST RECONSTRUCTION WITH TISSUE EXPANDERS;  Surgeon: Charles Ladd MD;  Location:  OR     REMOVE PORT VASCULAR ACCESS N/A 10/17/2022    Procedure: PORT REMOVAL;  Surgeon: Daria Sigala MD;  Location:  OR     ZZC ORAL SURGERY PROCEDURE      wisdom teeth, hypotension with anesthesia         SOCIAL HISTORY:  Social History     Socioeconomic History     Marital status:      Spouse name: Not on file     Number of children: Not on file     Years of education: Not on file     Highest education level: Not on file   Occupational History     Employer: [employer]   Tobacco Use     Smoking status: Former     Types: Cigarettes, Cigars     Quit date:      Years since quittin.2     Smokeless tobacco: Never     Tobacco comments:     Quit    Vaping Use     Vaping status: Never Used     Passive vaping exposure: Yes   Substance and Sexual Activity     Alcohol use: Yes     Comment: occ     Drug use: No     Sexual activity: Yes     Partners: Male     Birth control/protection: I.U.D.     Comment: mirena    Other Topics Concern     Parent/sibling w/ CABG, MI or angioplasty before 65F 55M? Not Asked   Social History Narrative     Not on file     Social Determinants of Health     Financial Resource Strain: Not on file   Food Insecurity: Not on file   Transportation Needs: Not  "on file   Physical Activity: Not on file   Stress: Not on file   Social Connections: Not on file   Intimate Partner Violence: Not on file   Housing Stability: Not on file         FAMILY HISTORY:  Family History   Problem Relation Age of Onset     Diabetes Mother      Hypertension Mother      Cancer Father         penial     Hypertension Father      Cancer Maternal Grandmother         liver     Heart Disease Maternal Grandfather         MI     Cancer Paternal Grandmother         brain     Heart Disease Paternal Grandfather         MI         PHYSICAL EXAM:  Vital signs:  /83   Pulse 72   Resp 16   Ht 1.6 m (5' 3\")   Wt 101.3 kg (223 lb 6.4 oz)   SpO2 98%   BMI 39.57 kg/m     GENERAL: No acute distress.  EYES: No scleral icterus. No overt erythema.  BREAST: Bilateral breasts are surgically absent with no chest wall masses.  RESPIRATORY: No audible cough, wheezing, or labored breathing.  MUSCULOSKELETAL: Range of motion in the neck, shoulders, and arms appear normal.  SKIN: No overt rashes, discolorations, or lesions over the face and neck. Alopecia present.  NEUROLOGIC: Alert.  No overt tremors.  PSYCHIATRIC: Normal affect and mood.  Does not appear anxious.    LABS:  CBC RESULTS: Recent Labs   Lab Test 10/14/22  1104   WBC 6.3   RBC 3.56*   HGB 12.2   HCT 37.2   *   MCH 34.3*   MCHC 32.8   RDW 14.9          Last Comprehensive Metabolic Panel:  Sodium   Date Value Ref Range Status   12/06/2022 142 136 - 145 mmol/L Final     Potassium   Date Value Ref Range Status   12/06/2022 3.6 3.4 - 5.3 mmol/L Final   08/24/2022 4.0 3.4 - 5.3 mmol/L Final     Chloride   Date Value Ref Range Status   12/06/2022 105 98 - 107 mmol/L Final   08/24/2022 112 (H) 94 - 109 mmol/L Final     Carbon Dioxide (CO2)   Date Value Ref Range Status   12/06/2022 30 (H) 22 - 29 mmol/L Final   08/24/2022 24 20 - 32 mmol/L Final     Anion Gap   Date Value Ref Range Status   12/06/2022 7 7 - 15 mmol/L Final   08/24/2022 7 3 - " 14 mmol/L Final     Glucose   Date Value Ref Range Status   2022 118 (H) 70 - 99 mg/dL Final   2022 132 (H) 70 - 99 mg/dL Final   2015 95 70 - 99 mg/dL Final     Comment:     Effective 2014, the reference range for this assay has changed to reflect   new instrumentation/methodology.       Urea Nitrogen   Date Value Ref Range Status   2022 13.2 6.0 - 20.0 mg/dL Final   2022 15 7 - 30 mg/dL Final     Creatinine   Date Value Ref Range Status   2022 0.87 0.51 - 0.95 mg/dL Final     GFR Estimate   Date Value Ref Range Status   2022 81 >60 mL/min/1.73m2 Final     Comment:     Effective 2021 eGFRcr in adults is calculated using the  CKD-EPI creatinine equation which includes age and gender (Luh et al., NE, DOI: 10.1056/ORSAzn4876468)     Calcium   Date Value Ref Range Status   2022 9.5 8.6 - 10.0 mg/dL Final     Bilirubin Total   Date Value Ref Range Status   2022 0.7 <=1.2 mg/dL Final     Alkaline Phosphatase   Date Value Ref Range Status   2022 116 (H) 35 - 104 U/L Final     ALT   Date Value Ref Range Status   2022 25 10 - 35 U/L Final     AST   Date Value Ref Range Status   2022 27 10 - 35 U/L Final       PATHOLOGY:  None new since prior visit.    IMAGIN2022: CT chest w/o contrast showed stable 2 mm nodule in posterior right upper lobe; no new or enlarging nodules; no lymphadenopathy.    ASSESSMENT/PLAN:  Risa Alcaraz is a 52 year old female with the following issues:  1.  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 Left breast DCIS  3. Chemo brain fogginess, improved  - Risa is status post bilateral mastectomies with negative surgical margins and no lymph node involvement followed by adjuvant chemotherapy with 4 cycles of Taxotere and Cytoxan.  - Discussed that her  "overall prognosis is still excellent given the lower tendency for the fibromatosis low-grade type of metaplastic cancer to metastasize distantly or to the lymph nodes.  --She has no clinical evidence for recurrent breast cancer by physical exam from today.  - Again discussed that surveillance following completion of chemotherapy would largely be based on history and physical exams with imaging and lab work-up reserved for any concerning signs or symptoms that could arise.     4. Indeterminate pulmonary nodule  --Reviewed 11/18/2022 CT chest which showed stable 2 mm nodule in right upper lobe. Plan to repeat CT chest in 11/2023.    5. Bone/joint pain  --Likely related to past chemotherapy.  --Anticipate this will subside over time, several months.  --Discussed she can try curcumin supplements, continue calcium/vitamin D supplements.    Return in 4 months.    Briseyda Mars MD  Hematology/Oncology  [facility]    Total time spent: 30 minutes in patient evaluation, counseling, documentation, and coordination of care.     Oncology Rooming Note    April 17, 2023 10:15 AM   Risa Alcaraz is a 52 year old female who presents for:    Chief Complaint   Patient presents with     Oncology Clinic Visit     Initial Vitals: There were no vitals taken for this visit. Estimated body mass index is 37.84 kg/m  as calculated from the following:    Height as of 12/16/22: 1.6 m (5' 3\").    Weight as of 12/16/22: 96.9 kg (213 lb 9.6 oz). There is no height or weight on file to calculate BSA.  Data Unavailable Comment: Data Unavailable   No LMP recorded. (Menstrual status: IUD).  Allergies reviewed: Yes  Medications reviewed: Yes    Medications: Medication refills not needed today.  Pharmacy name entered into [facility]:    [facility] [place], [place] - [address] [facility] [address] [facility], [place] - [address] [place]    Clinical concerns:  doctor was notified.      Toshia Royal, " MA                Again, thank you for allowing me to participate in the care of your patient.        Sincerely,        Briseyda Mars MD

## 2023-04-20 ENCOUNTER — PATIENT OUTREACH (OUTPATIENT)
Dept: CARE COORDINATION | Facility: CLINIC | Age: 53
End: 2023-04-20
Payer: COMMERCIAL

## 2023-06-01 ENCOUNTER — HEALTH MAINTENANCE LETTER (OUTPATIENT)
Age: 53
End: 2023-06-01

## 2023-10-02 ENCOUNTER — TELEPHONE (OUTPATIENT)
Dept: ONCOLOGY | Facility: CLINIC | Age: 53
End: 2023-10-02

## 2023-10-02 DIAGNOSIS — R91.8 PULMONARY NODULES: Primary | ICD-10-CM

## 2023-10-02 DIAGNOSIS — Z17.1 MALIGNANT NEOPLASM OF CENTRAL PORTION OF RIGHT BREAST IN FEMALE, ESTROGEN RECEPTOR NEGATIVE (H): ICD-10-CM

## 2023-10-02 DIAGNOSIS — C50.111 MALIGNANT NEOPLASM OF CENTRAL PORTION OF RIGHT BREAST IN FEMALE, ESTROGEN RECEPTOR NEGATIVE (H): ICD-10-CM

## 2023-10-02 NOTE — TELEPHONE ENCOUNTER
10/2/23 Risa is calling asking to get orders for a scan and follow up with Dr Mars.  Looking at Dr Mars's notes for paitent visit in April 2023.  Follow up in November 2023 with CT scan prior.  Please order CT scan so that I can get Risa scheduled for this and follow up with Dr Mars the end of October, early November 2023.   Thank you,     Hollie Lopez  Complex   [facility]

## 2023-10-20 ENCOUNTER — HOSPITAL ENCOUNTER (OUTPATIENT)
Dept: CT IMAGING | Facility: CLINIC | Age: 53
Discharge: HOME | End: 2023-10-20
Attending: INTERNAL MEDICINE | Admitting: INTERNAL MEDICINE
Payer: COMMERCIAL

## 2023-10-20 DIAGNOSIS — Z17.1 MALIGNANT NEOPLASM OF CENTRAL PORTION OF RIGHT BREAST IN FEMALE, ESTROGEN RECEPTOR NEGATIVE (H): ICD-10-CM

## 2023-10-20 DIAGNOSIS — R91.8 PULMONARY NODULES: ICD-10-CM

## 2023-10-20 DIAGNOSIS — C50.111 MALIGNANT NEOPLASM OF CENTRAL PORTION OF RIGHT BREAST IN FEMALE, ESTROGEN RECEPTOR NEGATIVE (H): ICD-10-CM

## 2023-10-20 PROCEDURE — 71250 CT THORAX DX C-: CPT

## 2023-10-27 NOTE — PROGRESS NOTES
Virtual Visit Details    Type of service:  Video Visit     Originating Location (pt. Location): Home    Distant Location (provider location):  On-site  Platform used for Video Visit: [facility]    Hematology/Oncology Established Patient Note      Today's Date: 11/1/2023    Reason for follow-up: Right breast cancer.    HISTORY OF PRESENT ILLNESS: Risa Alcaraz is a 52 year old female who presents with the following oncologic history:  1.  4/22/2022: Mammogram showed distortion in the central right breast.  Left breast is negative.  2.  5/2/2022: Diagnostic right mammogram showed distortion in the retroareolar breast.  Targeted ultrasound of the right breast at 12:00, 2 cm from the nipple measured 1.8 x 1.3 cm.  Survey of axilla showed no evidence of adenopathy.  3.  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  4. 5/24/2022: Breast MRI showed nonmass enhancement at the 12:00 position of the RIGHT breast corresponds to biopsy-proven malignancy and measures 4.3 x 2.3 x 3.2 cm. Nodular nonmass enhancement at the 12:00 position of the LEFT breast. No evidence of adenopathy.  5. 5/25/2022: CT chest/abdomen/pelvis showed 2 mm pulmonary nodule in right upper lobe posteriorly; mildly prominent prevascular lymph node between the right brachiocephalic and left common carotid arteries measures 8 mm, upper limits of normal; no definite evidence of metastatic disease. NM bone scan showed no bone metastases.  6. 6/1/2022: Left breast U/S showed hypoechoic area measuring 2 cm at 11:00, 5 cm from nipple corresponding to MRI finding. Biopsy of left breast lesion showed nuclear grade 1 DCIS; no evidence of invasive carcinoma or LVI, ER and NM both 100% positive.  After breast tumor board discussion, consensus was for patient to proceed with surgery upfront followed by adjuvant chemotherapy.  7. 6/9/2022: BRCA gene panel negative.  8. 6/15/2022: Underwent bilateral skin sparing mastectomies with bilateral axillary sentinel lymph node excision, port placement under care of Dr. Daria Sigala and reconstruction with tissue expanders with Dr. Charles Ladd.  Pathology showed 8 x 5 mm right breast invasive mammary carcinoma with features of low-grade fibromatosis like metaplastic carcinoma and without extension to margins.  Right breast tumor was multifocal with total of 3 foci: 8 mm, 3 mm, 2 mm.  Extensive DCIS, grade 1 and 2 with involvement of sclerosing adenosis and cancerization of lobules and without extension of margins.  A single right axillary lymph node is negative for metastatic disease.  Left breast showed extensive DCIS involving extensive sclerosing adenosis without extension to margins.  No evidence of invasive carcinoma in the left breast.  A single left axillary lymph node is negative for metastatic disease.  9.  7/13/2022-9/14/2022: Completed adjuvant chemotherapy with Taxotere and Cytoxan.    INTERVAL HISTORY:  Risa reports she is trying to control her joint pain with chiropractor sessions. Denies any new nodules around the chest wall areas.      REVIEW OF SYSTEMS:   14 point ROS was reviewed and is negative other than as noted above in HPI.       HOME MEDICATIONS:  Current Outpatient Medications   Medication Sig Dispense Refill    multivitamin w/minerals (THERA-VIT-M) tablet Take 1 tablet by mouth daily      paragard intrauterine copper device 1 each by Intrauterine route once      vitamin C (ASCORBIC ACID) 1000 MG TABS Take 1,000 mg by mouth daily           ALLERGIES:  No Known Allergies      PAST MEDICAL HISTORY:  Past Medical History:   Diagnosis Date    Obese          PAST SURGICAL HISTORY:  Past Surgical History:   Procedure Laterality Date    BIOPSY NODE  SENTINEL Bilateral 6/15/2022    Procedure: BILATERAL AXILLARY SENTINEL LYMPH NODE BIOPSIES;  Surgeon: Daria Sigala MD;  Location: [facility] OR    INSERT PORT VASCULAR ACCESS N/A 6/15/2022    Procedure: PORT PLACEMENT;  Surgeon: Daria Sigala MD;  Location: [facility] OR    MASTECTOMY SIMPLE BILATERAL Bilateral 6/15/2022    Procedure: BILATERAL SKIN SPARING MASTECTOMIES;  Surgeon: Daria Sigala MD;  Location: [facility] OR    RECONSTRUCT BREAST, INSERT TISSUE EXPANDER BILATERAL, COMBINED Bilateral 6/15/2022    Procedure: BILATERAL BREAST RECONSTRUCTION WITH TISSUE EXPANDERS;  Surgeon: Charles Ladd MD;  Location: [facility] OR    REMOVE PORT VASCULAR ACCESS N/A 10/17/2022    Procedure: PORT REMOVAL;  Surgeon: Daria Sigala MD;  Location:  OR    ZZC ORAL SURGERY PROCEDURE      wisdom teeth, hypotension with anesthesia         SOCIAL HISTORY:  Social History     Socioeconomic History    Marital status:      Spouse name: Not on file    Number of children: Not on file    Years of education: Not on file    Highest education level: Not on file   Occupational History     Employer: [employer]   Tobacco Use    Smoking status: Former     Types: Cigarettes, Cigars     Quit date:      Years since quittin.8    Smokeless tobacco: Never    Tobacco comments:     Quit    Vaping Use    Vaping Use: Never used   Substance and Sexual Activity    Alcohol use: Yes     Comment: occ    Drug use: No    Sexual activity: Yes     Partners: Male     Birth control/protection: I.U.D.     Comment: mirena    Other Topics Concern    Parent/sibling w/ CABG, MI or angioplasty before 65F 55M? Not Asked   Social History Narrative    Not on file     Social Determinants of Health     Financial Resource Strain: Not on file   Food Insecurity: Not on file   Transportation Needs: Not on file   Physical Activity: Not on file   Stress: Not on file   Social Connections: Not on file   Interpersonal Safety: Not on file   Housing Stability: Not on  file         FAMILY HISTORY:  Family History   Problem Relation Age of Onset    Diabetes Mother     Hypertension Mother     Cancer Father         penial    Hypertension Father     Cancer Maternal Grandmother         liver    Heart Disease Maternal Grandfather         MI    Cancer Paternal Grandmother         brain    Heart Disease Paternal Grandfather         MI         PHYSICAL EXAM:  Vital signs:  There were no vitals taken for this visit.   GENERAL: No acute distress.  EYES: No scleral icterus. No overt erythema.  RESPIRATORY: No audible cough, wheezing, or labored breathing.  MUSCULOSKELETAL: Range of motion in the neck, shoulders, and arms appear normal.  SKIN: No overt rashes, discolorations, or lesions over the face and neck.  NEUROLOGIC: Alert.  No overt tremors.  PSYCHIATRIC: Normal affect and mood.  Does not appear anxious.     LABS:  CBC RESULTS: Recent Labs   Lab Test 10/14/22  1104   WBC 6.3   RBC 3.56*   HGB 12.2   HCT 37.2   *   MCH 34.3*   MCHC 32.8   RDW 14.9          Last Comprehensive Metabolic Panel:  Sodium   Date Value Ref Range Status   12/06/2022 142 136 - 145 mmol/L Final     Potassium   Date Value Ref Range Status   12/06/2022 3.6 3.4 - 5.3 mmol/L Final   08/24/2022 4.0 3.4 - 5.3 mmol/L Final     Chloride   Date Value Ref Range Status   12/06/2022 105 98 - 107 mmol/L Final   08/24/2022 112 (H) 94 - 109 mmol/L Final     Carbon Dioxide (CO2)   Date Value Ref Range Status   12/06/2022 30 (H) 22 - 29 mmol/L Final   08/24/2022 24 20 - 32 mmol/L Final     Anion Gap   Date Value Ref Range Status   12/06/2022 7 7 - 15 mmol/L Final   08/24/2022 7 3 - 14 mmol/L Final     Glucose   Date Value Ref Range Status   12/06/2022 118 (H) 70 - 99 mg/dL Final   08/24/2022 132 (H) 70 - 99 mg/dL Final   01/30/2015 95 70 - 99 mg/dL Final     Comment:     Effective 7/30/2014, the reference range for this assay has changed to reflect   new instrumentation/methodology.       Urea Nitrogen   Date Value  Ref Range Status   12/06/2022 13.2 6.0 - 20.0 mg/dL Final   08/24/2022 15 7 - 30 mg/dL Final     Creatinine   Date Value Ref Range Status   12/06/2022 0.87 0.51 - 0.95 mg/dL Final     GFR Estimate   Date Value Ref Range Status   12/06/2022 81 >60 mL/min/1.73m2 Final     Comment:     Effective December 21, 2021 eGFRcr in adults is calculated using the 2021 CKD-EPI creatinine equation which includes age and gender (Luh et al., NEJ, DOI: 10.1056/SVRWtd8119331)     Calcium   Date Value Ref Range Status   12/06/2022 9.5 8.6 - 10.0 mg/dL Final     Bilirubin Total   Date Value Ref Range Status   12/06/2022 0.7 <=1.2 mg/dL Final     Alkaline Phosphatase   Date Value Ref Range Status   12/06/2022 116 (H) 35 - 104 U/L Final     ALT   Date Value Ref Range Status   12/06/2022 25 10 - 35 U/L Final     AST   Date Value Ref Range Status   12/06/2022 27 10 - 35 U/L Final       PATHOLOGY:  None new since prior visit.    IMAGING:  10/20/2023: CT chest w/o contrast showed stable 2 mm nodule in right upper lobe; no new or enlarging nodules; no lymphadenopathy.    ASSESSMENT/PLAN:  Risa Alcaraz is a 52 year old female with the following issues:  1.  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 Left breast DCIS  3. Chemo brain fogginess, improved  - Risa is status post bilateral mastectomies with negative surgical margins and no lymph node involvement followed by adjuvant chemotherapy with 4 cycles of Taxotere and Cytoxan.  - Discussed that her overall prognosis is still excellent given the lower tendency for the fibromatosis low-grade type of metaplastic cancer to metastasize distantly or to the lymph nodes.  --She has no clinical evidence for recurrent breast cancer by history.  - Continue clinical surveillance.     4. Indeterminate pulmonary nodule  --I personally reviewed 10/20/2023 CT  chest which showed stable 2 mm nodule in right upper lobe. Plan to repeat CT chest in 10/2024.    5. Bone/joint pain  --Likely related to past chemotherapy.  --Anticipate this will subside over time, several months.  --Discussed she can try curcumin supplements, continue calcium/vitamin D supplements.    Return in 6 months.    Briseyda Mars MD  Hematology/Oncology  [facility]    Total time spent today: 20 minutes in chart review, patient evaluation, counseling, documentation, test and/or medication/prescription orders, and coordination of care.

## 2023-11-01 ENCOUNTER — VIRTUAL VISIT (OUTPATIENT)
Dept: ONCOLOGY | Facility: CLINIC | Age: 53
End: 2023-11-01
Attending: INTERNAL MEDICINE
Payer: COMMERCIAL

## 2023-11-01 VITALS — HEIGHT: 63 IN | WEIGHT: 213 LBS | BODY MASS INDEX: 37.74 KG/M2

## 2023-11-01 DIAGNOSIS — Z17.1 MALIGNANT NEOPLASM OF CENTRAL PORTION OF RIGHT BREAST IN FEMALE, ESTROGEN RECEPTOR NEGATIVE (H): Primary | ICD-10-CM

## 2023-11-01 DIAGNOSIS — C50.111 MALIGNANT NEOPLASM OF CENTRAL PORTION OF RIGHT BREAST IN FEMALE, ESTROGEN RECEPTOR NEGATIVE (H): Primary | ICD-10-CM

## 2023-11-01 DIAGNOSIS — D05.12 DUCTAL CARCINOMA IN SITU (DCIS) OF LEFT BREAST: ICD-10-CM

## 2023-11-01 DIAGNOSIS — R91.8 PULMONARY NODULES: ICD-10-CM

## 2023-11-01 PROCEDURE — 99213 OFFICE O/P EST LOW 20 MIN: CPT | Mod: 95 | Performed by: INTERNAL MEDICINE

## 2023-11-01 ASSESSMENT — PAIN SCALES - GENERAL: PAINLEVEL: NO PAIN (0)

## 2023-11-01 NOTE — LETTER
11/1/2023         RE: Risa Alcaraz  [address] [facility] [address]        Dear Colleague,    Thank you for referring your patient, Risa Alcaraz, to the [facility]. Please see a copy of my visit note below.    Virtual Visit Details    Type of service:  Video Visit     Originating Location (pt. Location): Home    Distant Location (provider location):  On-site  Platform used for Video Visit: [employer]    Hematology/Oncology Established Patient Note      Today's Date: 11/1/2023    Reason for follow-up: Right breast cancer.    HISTORY OF PRESENT ILLNESS: Risa Alcaraz is a 52 year old female who presents with the following oncologic history:  1.  4/22/2022: Mammogram showed distortion in the central right breast.  Left breast is negative.  2.  5/2/2022: Diagnostic right mammogram showed distortion in the retroareolar breast.  Targeted ultrasound of the right breast at 12:00, 2 cm from the nipple measured 1.8 x 1.3 cm.  Survey of axilla showed no evidence of adenopathy.  3.  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  4. 5/24/2022: Breast MRI showed nonmass enhancement at the 12:00 position of the RIGHT breast corresponds to biopsy-proven malignancy and measures 4.3 x 2.3 x 3.2 cm. Nodular nonmass enhancement at the 12:00 position of the LEFT breast. No evidence of adenopathy.  5. 5/25/2022: CT chest/abdomen/pelvis showed 2 mm pulmonary nodule in right upper lobe posteriorly; mildly prominent prevascular lymph node between the right brachiocephalic and left common carotid arteries measures 8 mm, upper limits of normal; no definite evidence of metastatic disease. NM bone scan showed no bone metastases.  6.  6/1/2022: Left breast U/S showed hypoechoic area measuring 2 cm at 11:00, 5 cm from nipple corresponding to MRI finding. Biopsy of left breast lesion showed nuclear grade 1 DCIS; no evidence of invasive carcinoma or LVI, ER and OR both 100% positive. After breast tumor board discussion, consensus was for patient to proceed with surgery upfront followed by adjuvant chemotherapy.  7. 6/9/2022: BRCA gene panel negative.  8. 6/15/2022: Underwent bilateral skin sparing mastectomies with bilateral axillary sentinel lymph node excision, port placement under care of Dr. Daria Sigala and reconstruction with tissue expanders with Dr. Charles Ladd.  Pathology showed 8 x 5 mm right breast invasive mammary carcinoma with features of low-grade fibromatosis like metaplastic carcinoma and without extension to margins.  Right breast tumor was multifocal with total of 3 foci: 8 mm, 3 mm, 2 mm.  Extensive DCIS, grade 1 and 2 with involvement of sclerosing adenosis and cancerization of lobules and without extension of margins.  A single right axillary lymph node is negative for metastatic disease.  Left breast showed extensive DCIS involving extensive sclerosing adenosis without extension to margins.  No evidence of invasive carcinoma in the left breast.  A single left axillary lymph node is negative for metastatic disease.  9.  7/13/2022-9/14/2022: Completed adjuvant chemotherapy with Taxotere and Cytoxan.    INTERVAL HISTORY:  Risa reports she is trying to control her joint pain with chiropractor sessions. Denies any new nodules around the chest wall areas.      REVIEW OF SYSTEMS:   14 point ROS was reviewed and is negative other than as noted above in HPI.       HOME MEDICATIONS:  Current Outpatient Medications   Medication Sig Dispense Refill     multivitamin w/minerals (THERA-VIT-M) tablet Take 1 tablet by mouth daily       paragard intrauterine copper device 1 each by Intrauterine route once       vitamin C (ASCORBIC ACID) 1000  MG TABS Take 1,000 mg by mouth daily           ALLERGIES:  No Known Allergies      PAST MEDICAL HISTORY:  Past Medical History:   Diagnosis Date     Obese          PAST SURGICAL HISTORY:  Past Surgical History:   Procedure Laterality Date     BIOPSY NODE SENTINEL Bilateral 6/15/2022    Procedure: BILATERAL AXILLARY SENTINEL LYMPH NODE BIOPSIES;  Surgeon: Daria Sigala MD;  Location: [facility] OR     INSERT PORT VASCULAR ACCESS N/A 6/15/2022    Procedure: PORT PLACEMENT;  Surgeon: Daria Sigala MD;  Location: [facility] OR     MASTECTOMY SIMPLE BILATERAL Bilateral 6/15/2022    Procedure: BILATERAL SKIN SPARING MASTECTOMIES;  Surgeon: Daria Sigala MD;  Location:  OR     RECONSTRUCT BREAST, INSERT TISSUE EXPANDER BILATERAL, COMBINED Bilateral 6/15/2022    Procedure: BILATERAL BREAST RECONSTRUCTION WITH TISSUE EXPANDERS;  Surgeon: Charles Ladd MD;  Location:  OR     REMOVE PORT VASCULAR ACCESS N/A 10/17/2022    Procedure: PORT REMOVAL;  Surgeon: Daria Sigala MD;  Location:  OR     ZZC ORAL SURGERY PROCEDURE      wisdom teeth, hypotension with anesthesia         SOCIAL HISTORY:  Social History     Socioeconomic History     Marital status:      Spouse name: Not on file     Number of children: Not on file     Years of education: Not on file     Highest education level: Not on file   Occupational History     Employer: [employer]   Tobacco Use     Smoking status: Former     Types: Cigarettes, Cigars     Quit date:      Years since quittin.8     Smokeless tobacco: Never     Tobacco comments:     Quit    Vaping Use     Vaping Use: Never used   Substance and Sexual Activity     Alcohol use: Yes     Comment: occ     Drug use: No     Sexual activity: Yes     Partners: Male     Birth control/protection: I.U.D.     Comment: mirena    Other Topics Concern     Parent/sibling w/ CABG, MI or angioplasty before 65F 55M? Not Asked   Social History Narrative     Not on file     Social Determinants  of Health     Financial Resource Strain: Not on file   Food Insecurity: Not on file   Transportation Needs: Not on file   Physical Activity: Not on file   Stress: Not on file   Social Connections: Not on file   Interpersonal Safety: Not on file   Housing Stability: Not on file         FAMILY HISTORY:  Family History   Problem Relation Age of Onset     Diabetes Mother      Hypertension Mother      Cancer Father         penial     Hypertension Father      Cancer Maternal Grandmother         liver     Heart Disease Maternal Grandfather         MI     Cancer Paternal Grandmother         brain     Heart Disease Paternal Grandfather         MI         PHYSICAL EXAM:  Vital signs:  There were no vitals taken for this visit.   GENERAL: No acute distress.  EYES: No scleral icterus. No overt erythema.  RESPIRATORY: No audible cough, wheezing, or labored breathing.  MUSCULOSKELETAL: Range of motion in the neck, shoulders, and arms appear normal.  SKIN: No overt rashes, discolorations, or lesions over the face and neck.  NEUROLOGIC: Alert.  No overt tremors.  PSYCHIATRIC: Normal affect and mood.  Does not appear anxious.     LABS:  CBC RESULTS: Recent Labs   Lab Test 10/14/22  1104   WBC 6.3   RBC 3.56*   HGB 12.2   HCT 37.2   *   MCH 34.3*   MCHC 32.8   RDW 14.9          Last Comprehensive Metabolic Panel:  Sodium   Date Value Ref Range Status   12/06/2022 142 136 - 145 mmol/L Final     Potassium   Date Value Ref Range Status   12/06/2022 3.6 3.4 - 5.3 mmol/L Final   08/24/2022 4.0 3.4 - 5.3 mmol/L Final     Chloride   Date Value Ref Range Status   12/06/2022 105 98 - 107 mmol/L Final   08/24/2022 112 (H) 94 - 109 mmol/L Final     Carbon Dioxide (CO2)   Date Value Ref Range Status   12/06/2022 30 (H) 22 - 29 mmol/L Final   08/24/2022 24 20 - 32 mmol/L Final     Anion Gap   Date Value Ref Range Status   12/06/2022 7 7 - 15 mmol/L Final   08/24/2022 7 3 - 14 mmol/L Final     Glucose   Date Value Ref Range Status    12/06/2022 118 (H) 70 - 99 mg/dL Final   08/24/2022 132 (H) 70 - 99 mg/dL Final   01/30/2015 95 70 - 99 mg/dL Final     Comment:     Effective 7/30/2014, the reference range for this assay has changed to reflect   new instrumentation/methodology.       Urea Nitrogen   Date Value Ref Range Status   12/06/2022 13.2 6.0 - 20.0 mg/dL Final   08/24/2022 15 7 - 30 mg/dL Final     Creatinine   Date Value Ref Range Status   12/06/2022 0.87 0.51 - 0.95 mg/dL Final     GFR Estimate   Date Value Ref Range Status   12/06/2022 81 >60 mL/min/1.73m2 Final     Comment:     Effective December 21, 2021 eGFRcr in adults is calculated using the 2021 CKD-EPI creatinine equation which includes age and gender (Luh et al., NEJ, DOI: 10.1056/KNNLsk7278496)     Calcium   Date Value Ref Range Status   12/06/2022 9.5 8.6 - 10.0 mg/dL Final     Bilirubin Total   Date Value Ref Range Status   12/06/2022 0.7 <=1.2 mg/dL Final     Alkaline Phosphatase   Date Value Ref Range Status   12/06/2022 116 (H) 35 - 104 U/L Final     ALT   Date Value Ref Range Status   12/06/2022 25 10 - 35 U/L Final     AST   Date Value Ref Range Status   12/06/2022 27 10 - 35 U/L Final       PATHOLOGY:  None new since prior visit.    IMAGING:  10/20/2023: CT chest w/o contrast showed stable 2 mm nodule in right upper lobe; no new or enlarging nodules; no lymphadenopathy.    ASSESSMENT/PLAN:  Risa Alcaraz is a 52 year old female with the following issues:  1.  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 Left breast DCIS  3. Chemo brain fogginess, improved  - Risa is status post bilateral mastectomies with negative surgical margins and no lymph node involvement followed by adjuvant chemotherapy with 4 cycles of Taxotere and Cytoxan.  - Discussed that her overall prognosis is still excellent given the lower tendency for the  fibromatosis low-grade type of metaplastic cancer to metastasize distantly or to the lymph nodes.  --She has no clinical evidence for recurrent breast cancer by history.  - Continue clinical surveillance.     4. Indeterminate pulmonary nodule  --I personally reviewed 10/20/2023 CT chest which showed stable 2 mm nodule in right upper lobe. Plan to repeat CT chest in 10/2024.    5. Bone/joint pain  --Likely related to past chemotherapy.  --Anticipate this will subside over time, several months.  --Discussed she can try curcumin supplements, continue calcium/vitamin D supplements.    Return in 6 months.    Briseyda Mars MD  Hematology/Oncology  [facility]    Total time spent today: 20 minutes in chart review, patient evaluation, counseling, documentation, test and/or medication/prescription orders, and coordination of care.       Again, thank you for allowing me to participate in the care of your patient.        Sincerely,        Briseyda Mars MD

## 2023-11-01 NOTE — LETTER
11/1/2023         RE: Risa Alcaraz  [address] [facility] [address]        Dear Colleague,    Thank you for referring your patient, Risa Alcaraz, to the [facility]. Please see a copy of my visit note below.    Virtual Visit Details    Type of service:  Video Visit     Originating Location (pt. Location): Home    Distant Location (provider location):  On-site  Platform used for Video Visit: [facility]    Hematology/Oncology Established Patient Note      Today's Date: 11/1/2023    Reason for follow-up: Right breast cancer.    HISTORY OF PRESENT ILLNESS: Risa Alcaraz is a 52 year old female who presents with the following oncologic history:  1.  4/22/2022: Mammogram showed distortion in the central right breast.  Left breast is negative.  2.  5/2/2022: Diagnostic right mammogram showed distortion in the retroareolar breast.  Targeted ultrasound of the right breast at 12:00, 2 cm from the nipple measured 1.8 x 1.3 cm.  Survey of axilla showed no evidence of adenopathy.  3.  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  4. 5/24/2022: Breast MRI showed nonmass enhancement at the 12:00 position of the RIGHT breast corresponds to biopsy-proven malignancy and measures 4.3 x 2.3 x 3.2 cm. Nodular nonmass enhancement at the 12:00 position of the LEFT breast. No evidence of adenopathy.  5. 5/25/2022: CT chest/abdomen/pelvis showed 2 mm pulmonary nodule in right upper lobe posteriorly; mildly prominent prevascular lymph node between the right brachiocephalic and left common carotid arteries measures 8 mm, upper limits of normal; no definite evidence of metastatic disease. NM bone scan showed no bone metastases.  6.  6/1/2022: Left breast U/S showed hypoechoic area measuring 2 cm at 11:00, 5 cm from nipple corresponding to MRI finding. Biopsy of left breast lesion showed nuclear grade 1 DCIS; no evidence of invasive carcinoma or LVI, ER and OH both 100% positive. After breast tumor board discussion, consensus was for patient to proceed with surgery upfront followed by adjuvant chemotherapy.  7. 6/9/2022: BRCA gene panel negative.  8. 6/15/2022: Underwent bilateral skin sparing mastectomies with bilateral axillary sentinel lymph node excision, port placement under care of Dr. Daria Sigala and reconstruction with tissue expanders with Dr. Charles Ladd.  Pathology showed 8 x 5 mm right breast invasive mammary carcinoma with features of low-grade fibromatosis like metaplastic carcinoma and without extension to margins.  Right breast tumor was multifocal with total of 3 foci: 8 mm, 3 mm, 2 mm.  Extensive DCIS, grade 1 and 2 with involvement of sclerosing adenosis and cancerization of lobules and without extension of margins.  A single right axillary lymph node is negative for metastatic disease.  Left breast showed extensive DCIS involving extensive sclerosing adenosis without extension to margins.  No evidence of invasive carcinoma in the left breast.  A single left axillary lymph node is negative for metastatic disease.  9.  7/13/2022-9/14/2022: Completed adjuvant chemotherapy with Taxotere and Cytoxan.    INTERVAL HISTORY:  Risa reports she is trying to control her joint pain with chiropractor sessions. Denies any new nodules around the chest wall areas.      REVIEW OF SYSTEMS:   14 point ROS was reviewed and is negative other than as noted above in HPI.       HOME MEDICATIONS:  Current Outpatient Medications   Medication Sig Dispense Refill     multivitamin w/minerals (THERA-VIT-M) tablet Take 1 tablet by mouth daily       paragard intrauterine copper device 1 each by Intrauterine route once       vitamin C (ASCORBIC ACID) 1000  MG TABS Take 1,000 mg by mouth daily           ALLERGIES:  No Known Allergies      PAST MEDICAL HISTORY:  Past Medical History:   Diagnosis Date     Obese          PAST SURGICAL HISTORY:  Past Surgical History:   Procedure Laterality Date     BIOPSY NODE SENTINEL Bilateral 6/15/2022    Procedure: BILATERAL AXILLARY SENTINEL LYMPH NODE BIOPSIES;  Surgeon: Daria Siagla MD;  Location: [facility] OR     INSERT PORT VASCULAR ACCESS N/A 6/15/2022    Procedure: PORT PLACEMENT;  Surgeon: Daria Sigala MD;  Location: [facility] OR     MASTECTOMY SIMPLE BILATERAL Bilateral 6/15/2022    Procedure: BILATERAL SKIN SPARING MASTECTOMIES;  Surgeon: Daria Sigala MD;  Location:  OR     RECONSTRUCT BREAST, INSERT TISSUE EXPANDER BILATERAL, COMBINED Bilateral 6/15/2022    Procedure: BILATERAL BREAST RECONSTRUCTION WITH TISSUE EXPANDERS;  Surgeon: Charles Ladd MD;  Location:  OR     REMOVE PORT VASCULAR ACCESS N/A 10/17/2022    Procedure: PORT REMOVAL;  Surgeon: Daria Sigala MD;  Location:  OR     ZZC ORAL SURGERY PROCEDURE      wisdom teeth, hypotension with anesthesia         SOCIAL HISTORY:  Social History     Socioeconomic History     Marital status:      Spouse name: Not on file     Number of children: Not on file     Years of education: Not on file     Highest education level: Not on file   Occupational History     Employer: [employer]   Tobacco Use     Smoking status: Former     Types: Cigarettes, Cigars     Quit date:      Years since quittin.8     Smokeless tobacco: Never     Tobacco comments:     Quit    Vaping Use     Vaping Use: Never used   Substance and Sexual Activity     Alcohol use: Yes     Comment: occ     Drug use: No     Sexual activity: Yes     Partners: Male     Birth control/protection: I.U.D.     Comment: mirena    Other Topics Concern     Parent/sibling w/ CABG, MI or angioplasty before 65F 55M? Not Asked   Social History Narrative     Not on file     Social Determinants  of Health     Financial Resource Strain: Not on file   Food Insecurity: Not on file   Transportation Needs: Not on file   Physical Activity: Not on file   Stress: Not on file   Social Connections: Not on file   Interpersonal Safety: Not on file   Housing Stability: Not on file         FAMILY HISTORY:  Family History   Problem Relation Age of Onset     Diabetes Mother      Hypertension Mother      Cancer Father         penial     Hypertension Father      Cancer Maternal Grandmother         liver     Heart Disease Maternal Grandfather         MI     Cancer Paternal Grandmother         brain     Heart Disease Paternal Grandfather         MI         PHYSICAL EXAM:  Vital signs:  There were no vitals taken for this visit.   GENERAL: No acute distress.  EYES: No scleral icterus. No overt erythema.  RESPIRATORY: No audible cough, wheezing, or labored breathing.  MUSCULOSKELETAL: Range of motion in the neck, shoulders, and arms appear normal.  SKIN: No overt rashes, discolorations, or lesions over the face and neck.  NEUROLOGIC: Alert.  No overt tremors.  PSYCHIATRIC: Normal affect and mood.  Does not appear anxious.     LABS:  CBC RESULTS: Recent Labs   Lab Test 10/14/22  1104   WBC 6.3   RBC 3.56*   HGB 12.2   HCT 37.2   *   MCH 34.3*   MCHC 32.8   RDW 14.9          Last Comprehensive Metabolic Panel:  Sodium   Date Value Ref Range Status   12/06/2022 142 136 - 145 mmol/L Final     Potassium   Date Value Ref Range Status   12/06/2022 3.6 3.4 - 5.3 mmol/L Final   08/24/2022 4.0 3.4 - 5.3 mmol/L Final     Chloride   Date Value Ref Range Status   12/06/2022 105 98 - 107 mmol/L Final   08/24/2022 112 (H) 94 - 109 mmol/L Final     Carbon Dioxide (CO2)   Date Value Ref Range Status   12/06/2022 30 (H) 22 - 29 mmol/L Final   08/24/2022 24 20 - 32 mmol/L Final     Anion Gap   Date Value Ref Range Status   12/06/2022 7 7 - 15 mmol/L Final   08/24/2022 7 3 - 14 mmol/L Final     Glucose   Date Value Ref Range Status    12/06/2022 118 (H) 70 - 99 mg/dL Final   08/24/2022 132 (H) 70 - 99 mg/dL Final   01/30/2015 95 70 - 99 mg/dL Final     Comment:     Effective 7/30/2014, the reference range for this assay has changed to reflect   new instrumentation/methodology.       Urea Nitrogen   Date Value Ref Range Status   12/06/2022 13.2 6.0 - 20.0 mg/dL Final   08/24/2022 15 7 - 30 mg/dL Final     Creatinine   Date Value Ref Range Status   12/06/2022 0.87 0.51 - 0.95 mg/dL Final     GFR Estimate   Date Value Ref Range Status   12/06/2022 81 >60 mL/min/1.73m2 Final     Comment:     Effective December 21, 2021 eGFRcr in adults is calculated using the 2021 CKD-EPI creatinine equation which includes age and gender (Luh et al., NEJ, DOI: 10.1056/LMFNxp0680768)     Calcium   Date Value Ref Range Status   12/06/2022 9.5 8.6 - 10.0 mg/dL Final     Bilirubin Total   Date Value Ref Range Status   12/06/2022 0.7 <=1.2 mg/dL Final     Alkaline Phosphatase   Date Value Ref Range Status   12/06/2022 116 (H) 35 - 104 U/L Final     ALT   Date Value Ref Range Status   12/06/2022 25 10 - 35 U/L Final     AST   Date Value Ref Range Status   12/06/2022 27 10 - 35 U/L Final       PATHOLOGY:  None new since prior visit.    IMAGING:  10/20/2023: CT chest w/o contrast showed stable 2 mm nodule in right upper lobe; no new or enlarging nodules; no lymphadenopathy.    ASSESSMENT/PLAN:  Risa Alcaraz is a 52 year old female with the following issues:  1.  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 Left breast DCIS  3. Chemo brain fogginess, improved  - Risa is status post bilateral mastectomies with negative surgical margins and no lymph node involvement followed by adjuvant chemotherapy with 4 cycles of Taxotere and Cytoxan.  - Discussed that her overall prognosis is still excellent given the lower tendency for the  fibromatosis low-grade type of metaplastic cancer to metastasize distantly or to the lymph nodes.  --She has no clinical evidence for recurrent breast cancer by history.  - Continue clinical surveillance.     4. Indeterminate pulmonary nodule  --I personally reviewed 10/20/2023 CT chest which showed stable 2 mm nodule in right upper lobe. Plan to repeat CT chest in 10/2024.    5. Bone/joint pain  --Likely related to past chemotherapy.  --Anticipate this will subside over time, several months.  --Discussed she can try curcumin supplements, continue calcium/vitamin D supplements.    Return in 6 months.    Briseyda Mars MD  Hematology/Oncology  [facility]    Total time spent today: 20 minutes in chart review, patient evaluation, counseling, documentation, test and/or medication/prescription orders, and coordination of care.       Again, thank you for allowing me to participate in the care of your patient.        Sincerely,        Briseyda Mars MD

## 2023-11-01 NOTE — NURSING NOTE
Is the patient currently in the state of [place]? YES    Visit mode:VIDEO    If the visit is dropped, the patient can be reconnected by: VIDEO VISIT: Text to cell phone:   Telephone Information:   Mobile [phone]       Will anyone else be joining the visit? NO  (If patient encounters technical issues they should call [phone]    How would you like to obtain your AVS? MyChart    Are changes needed to the allergy or medication list? Pt stated no changes to allergies and Pt stated no med changes    Reason for visit: ALLISON Rucker LPN

## 2024-04-04 ENCOUNTER — TELEPHONE (OUTPATIENT)
Dept: ONCOLOGY | Facility: CLINIC | Age: 54
End: 2024-04-04
Payer: COMMERCIAL

## 2024-04-04 NOTE — TELEPHONE ENCOUNTER
Called patient and left message to call back. Patient is coming due for 6 month follow up with provider. 3rd attempt trying to contact patient.

## 2024-05-10 NOTE — PROGRESS NOTES
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 Mars ([facility])      Assessment/Plan  1.   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    She has recovered generally well after her treatments. Ongoing symptoms include lower extremity arthralgias and grade 1 neuropathy of fingers.     No evidence of recurrent cancer.    Plan:  -No role for mammogram surveillance post-bilateral mastectomies  -Continue every 6 mth exams. She prefers maintaining all of her follow-up at [facility]. She can follow with me in surveillance and I can have her re-establish with new Oncologist here or [place], as needed.   -Active surveillance in Oncology through 5 yrs    2.   Left breast ER/HI+ DCIS  Post-bilateral mastectomy. Additional treatment not needed    3.   Pulmonary nodule  Indeterminate on staging CT. Repeat CT 10/2023 showed stable 2 mm RUL nodule.     Plan:  -1-yr CT chest planned 10/2024. If stable, likely benign    ______________________________________________________________________________    History of Present Illness/ Interval History    Ms. Risa Alcaraz is a 53 year old with right triple negative low-grade invasive right breast cancer and left breast DCIS. She is s/p bilateral mastectomies and adjuvant chemo with Taxotere and Cytoxan (completed 1.5 yr ago). Returns for 6-mth visit.     Since her last visit, she's doing well.  Since chemo, has had ongoing LE joint pain with chiropractor sessions. No new focal bone pain.  No chest wall masses/changes. No weight loss. No headaches.   Mild peripheral neuropathy residual in fingers after chemo  which is not painful nor interfering with her activities.    ECOG Performance    0      Oncology History/Treatment  Diagnosis/Stage:   5/2022: Stage IA ([id number]-M0) right breast low-grade metaplastic cancer, triple negative AND left breast DCIS (ER/AK+)  -mammogram: distortion central R breast. L breast negative  -diagnostic mammogram: distortion retroareolar right breast. US R breast: 1.8 cm lesion right breast 12:00. No axillary adenopathy. US-biopsy invasive mammary ca with fatures of low-grade metaplastic carcinoma, fibromatosis type, associated DCIS, cribriform subtype, intermediate nuclear grade. ER neg, AK neg, HER2 neg.  -Breast MRI: 4.3 cm mass right breast correlating with biopsy proven cancer. Left breast showed non-mass enhancement 12:00. No adenopathy.   -US left breast: 2 cm hypoechoic area 11:00. Biopsy: grade 1 DCIS, no invasive ca or LVI. ER/%+.  -CTcap: No definite mets. Tiny lung nodules of indeterminate significance.   -Bone scan: no mets   -BRCA gene panel negative  -6/15/22 surgical path:  R breast mammary carcinoma with features of low-grade fibromatosis like metaplastic carcinoma; negative margins. Multifocal with total of 3 foci: 8 mm,  3mm, 2 mm. Extensive DCIS, grade 1 + 2 with involvement of sclerosing adenosis and cancerization of lobules, clear margins. Single R axillary LN negative.   L breast showed extensive DCIS involving extensive sclerosing adenosis, negative margins; no invasive carcinoma. Single L axillary LN negative.      Treatment:  6/15/2022: bilateral skin sparing mastectomies with bilateral axillary sLN excision and reconstruction with tissue expanders    7/13 - 9/14/2022: adjuvant Taxotere + Cytoxan x 4 cycles completed    Physical Exam    GENERAL: Alert and oriented to time place and person. Seated comfortably. In no distress.  HEAD: Atraumatic and normocephalic. Complete alopecia.  EYES: DENYS, EOMI. No erythema. No icterus.  LYMPH NODES: No palpable  supraclavicular, cervical, axillary lymphadenopathy.  BREASTS: Bilateral mastectomy with reconstruction. No chest wall masses.  CHEST: clear to auscultation bilaterally. Resonant to percussion throughout bilaterally. Symmetrical breath movements bilaterally.  CVS: RRR.  ABDOMEN: Soft. Not tender. Not distended. No palpable hepatomegaly or splenomegaly. No other mass palpable. Bowel sounds present.  EXTREMITIES: Warm. No peripheral edema.  SKIN: no rash, or bruising or purpura.   NEURO: No gross deficit noted. Non-antalgic gait.      Lab Results    No results found for this or any previous visit (from the past 168 hour(s)).      Imaging    No results found.    Billing  Total time 30 minutes, to include face to face visit, review of EMR, ordering, documentation and coordination of care on date of service.    complexity modifier for longitudinal care.       Signed by: Talia Bond NP

## 2024-05-13 ENCOUNTER — ONCOLOGY VISIT (OUTPATIENT)
Dept: ONCOLOGY | Facility: CLINIC | Age: 54
End: 2024-05-13
Attending: NURSE PRACTITIONER
Payer: COMMERCIAL

## 2024-05-13 VITALS
WEIGHT: 223 LBS | BODY MASS INDEX: 39.51 KG/M2 | TEMPERATURE: 98.4 F | HEIGHT: 63 IN | HEART RATE: 89 BPM | DIASTOLIC BLOOD PRESSURE: 82 MMHG | RESPIRATION RATE: 12 BRPM | OXYGEN SATURATION: 95 % | SYSTOLIC BLOOD PRESSURE: 152 MMHG

## 2024-05-13 DIAGNOSIS — Z17.1 MALIGNANT NEOPLASM OF CENTRAL PORTION OF RIGHT BREAST IN FEMALE, ESTROGEN RECEPTOR NEGATIVE (H): Primary | ICD-10-CM

## 2024-05-13 DIAGNOSIS — R91.8 PULMONARY NODULES: ICD-10-CM

## 2024-05-13 DIAGNOSIS — C50.111 MALIGNANT NEOPLASM OF CENTRAL PORTION OF RIGHT BREAST IN FEMALE, ESTROGEN RECEPTOR NEGATIVE (H): Primary | ICD-10-CM

## 2024-05-13 DIAGNOSIS — D05.12 DUCTAL CARCINOMA IN SITU (DCIS) OF LEFT BREAST: ICD-10-CM

## 2024-05-13 PROCEDURE — G2211 COMPLEX E/M VISIT ADD ON: HCPCS | Performed by: NURSE PRACTITIONER

## 2024-05-13 PROCEDURE — 99214 OFFICE O/P EST MOD 30 MIN: CPT | Performed by: NURSE PRACTITIONER

## 2024-05-13 ASSESSMENT — PAIN SCALES - GENERAL: PAINLEVEL: MILD PAIN (2)

## 2024-05-13 NOTE — LETTER
5/13/2024         RE: Risa Alcaraz  [address] [facility] [facility] [id number]        Dear Colleague,    Thank you for referring your patient, Risa Alcaraz, to the [facility]. Please see a copy of my visit note below.    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 Mars ([facility])      Assessment/Plan  1.   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    She has recovered generally well after her treatments. Ongoing symptoms include lower extremity arthralgias and grade 1 neuropathy of fingers.     No evidence of recurrent cancer.    Plan:  -No role for mammogram surveillance post-bilateral mastectomies  -Continue every 6 mth exams. She prefers maintaining all of her follow-up at [facility]. She can follow with me in surveillance and I can have her re-establish with new Oncologist here or [place], as needed.   -Active surveillance in Oncology through 5 yrs    2.   Left breast ER/IA+ DCIS  Post-bilateral mastectomy. Additional treatment not needed    3.   Pulmonary nodule  Indeterminate on staging CT. Repeat CT 10/2023 showed stable 2 mm RUL nodule.     Plan:  -1-yr CT chest planned 10/2024. If stable, likely benign    ______________________________________________________________________________    History of Present Illness/ Interval History    Ms. Risa Alcaraz is a 53 year old with right triple negative low-grade invasive right breast cancer and left breast DCIS. She is s/p bilateral mastectomies and adjuvant chemo with Taxotere and Cytoxan (completed 1.5 yr ago). Returns for 6-mth visit.      Since her last visit, she's doing well.  Since chemo, has had ongoing LE joint pain with chiropractor sessions. No new focal bone pain.  No chest wall masses/changes. No weight loss. No headaches.   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    ECOG Performance    0      Oncology History/Treatment  Diagnosis/Stage:   5/2022: Stage IA ([id number]-T5-H2) right breast low-grade metaplastic cancer, triple negative AND left breast DCIS (ER/CT+)  -mammogram: distortion central R breast. L breast negative  -diagnostic mammogram: distortion retroareolar right breast. US R breast: 1.8 cm lesion right breast 12:00. No axillary adenopathy. US-biopsy invasive mammary ca with fatures of low-grade metaplastic carcinoma, fibromatosis type, associated DCIS, cribriform subtype, intermediate nuclear grade. ER neg, CT neg, HER2 neg.  -Breast MRI: 4.3 cm mass right breast correlating with biopsy proven cancer. Left breast showed non-mass enhancement 12:00. No adenopathy.   -US left breast: 2 cm hypoechoic area 11:00. Biopsy: grade 1 DCIS, no invasive ca or LVI. ER/%+.  -CTcap: No definite mets. Tiny lung nodules of indeterminate significance.   -Bone scan: no mets   -BRCA gene panel negative  -6/15/22 surgical path:  R breast mammary carcinoma with features of low-grade fibromatosis like metaplastic carcinoma; negative margins. Multifocal with total of 3 foci: 8 mm,  3mm, 2 mm. Extensive DCIS, grade 1 + 2 with involvement of sclerosing adenosis and cancerization of lobules, clear margins. Single R axillary LN negative.   L breast showed extensive DCIS involving extensive sclerosing adenosis, negative margins; no invasive carcinoma. Single L axillary LN negative.      Treatment:  6/15/2022: bilateral skin sparing mastectomies with bilateral axillary sLN excision and reconstruction with tissue expanders    7/13 - 9/14/2022: adjuvant Taxotere + Cytoxan x 4  "cycles completed    Physical Exam    GENERAL: Alert and oriented to time place and person. Seated comfortably. In no distress.  HEAD: Atraumatic and normocephalic. Complete alopecia.  EYES: DENYS, EOMI. No erythema. No icterus.  LYMPH NODES: No palpable supraclavicular, cervical, axillary lymphadenopathy.  BREASTS: Bilateral mastectomy with reconstruction. No chest wall masses.  CHEST: clear to auscultation bilaterally. Resonant to percussion throughout bilaterally. Symmetrical breath movements bilaterally.  CVS: RRR.  ABDOMEN: Soft. Not tender. Not distended. No palpable hepatomegaly or splenomegaly. No other mass palpable. Bowel sounds present.  EXTREMITIES: Warm. No peripheral edema.  SKIN: no rash, or bruising or purpura.   NEURO: No gross deficit noted. Non-antalgic gait.      Lab Results    No results found for this or any previous visit (from the past 168 hour(s)).      Imaging    No results found.    Billing  Total time 30 minutes, to include face to face visit, review of EMR, ordering, documentation and coordination of care on date of service.    complexity modifier for longitudinal care.       Signed by: Talia Bond NP        Oncology Rooming Note    May 13, 2024 8:13 AM   Risa Alcaraz is a 53 year old female who presents for:    Chief Complaint   Patient presents with     Oncology Clinic Visit     Ductal carcinoma in situ (DCIS) of left breast - Provider visit only     Initial Vitals: BP (!) 152/82 (BP Location: Right arm, Patient Position: Sitting, Cuff Size: Adult Regular)   Pulse 89   Temp 98.4  F (36.9  C) (Tympanic)   Resp 12   Ht 1.6 m (5' 3\")   Wt 101.2 kg (223 lb)   SpO2 95%   BMI 39.50 kg/m   Estimated body mass index is 39.5 kg/m  as calculated from the following:    Height as of this encounter: 1.6 m (5' 3\").    Weight as of this encounter: 101.2 kg (223 lb). Body surface area is 2.12 meters squared.  Mild Pain (2) Comment: Data Unavailable   No LMP recorded. (Menstrual status: " IUD).  Allergies reviewed: Yes  Medications reviewed: Yes    Medications: Medication refills not needed today.  Pharmacy name entered into [facility]:    [facility] [place], [place] - [address] [facility] [place] [facility] [address] [place] [address]    Frailty Screening:   Is the patient here for a new oncology consult visit in cancer care? 2. No      Clinical concerns:  None      Lidia Connell CMA                Again, thank you for allowing me to participate in the care of your patient.        Sincerely,        Talia Bond, NP

## 2024-05-13 NOTE — PROGRESS NOTES
"Oncology Rooming Note    May 13, 2024 8:13 AM   Risa Alcaraz is a 53 year old female who presents for:    Chief Complaint   Patient presents with    Oncology Clinic Visit     Ductal carcinoma in situ (DCIS) of left breast - Provider visit only     Initial Vitals: BP (!) 152/82 (BP Location: Right arm, Patient Position: Sitting, Cuff Size: Adult Regular)   Pulse 89   Temp 98.4  F (36.9  C) (Tympanic)   Resp 12   Ht 1.6 m (5' 3\")   Wt 101.2 kg (223 lb)   SpO2 95%   BMI 39.50 kg/m   Estimated body mass index is 39.5 kg/m  as calculated from the following:    Height as of this encounter: 1.6 m (5' 3\").    Weight as of this encounter: 101.2 kg (223 lb). Body surface area is 2.12 meters squared.  Mild Pain (2) Comment: Data Unavailable   No LMP recorded. (Menstrual status: IUD).  Allergies reviewed: Yes  Medications reviewed: Yes    Medications: Medication refills not needed today.  Pharmacy name entered into [facility]:    [facility], [place] [facility] [address], [place] - [address] [place] [address]    Frailty Screening:   Is the patient here for a new oncology consult visit in cancer care? 2. No      Clinical concerns:  None      Lidia Connell CMA              "

## 2024-06-16 ENCOUNTER — HEALTH MAINTENANCE LETTER (OUTPATIENT)
Age: 54
End: 2024-06-16
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
## 2024-11-07 ENCOUNTER — TELEPHONE (OUTPATIENT)
Dept: FAMILY MEDICINE | Facility: CLINIC | Age: 54
End: 2024-11-07
Payer: COMMERCIAL

## 2024-11-08 ENCOUNTER — VIRTUAL VISIT (OUTPATIENT)
Dept: FAMILY MEDICINE | Facility: CLINIC | Age: 54
End: 2024-11-08
Payer: COMMERCIAL

## 2024-11-08 ENCOUNTER — TELEPHONE (OUTPATIENT)
Dept: GASTROENTEROLOGY | Facility: CLINIC | Age: 54
End: 2024-11-08

## 2024-11-08 DIAGNOSIS — R03.0 ELEVATED BP WITHOUT DIAGNOSIS OF HYPERTENSION: ICD-10-CM

## 2024-11-08 DIAGNOSIS — Z12.11 SCREEN FOR COLON CANCER: Primary | ICD-10-CM

## 2024-11-08 DIAGNOSIS — E66.812 CLASS 2 OBESITY DUE TO EXCESS CALORIES WITHOUT SERIOUS COMORBIDITY WITH BODY MASS INDEX (BMI) OF 39.0 TO 39.9 IN ADULT: ICD-10-CM

## 2024-11-08 DIAGNOSIS — E66.09 CLASS 2 OBESITY DUE TO EXCESS CALORIES WITHOUT SERIOUS COMORBIDITY WITH BODY MASS INDEX (BMI) OF 39.0 TO 39.9 IN ADULT: ICD-10-CM

## 2024-11-08 PROCEDURE — 99214 OFFICE O/P EST MOD 30 MIN: CPT | Mod: 95 | Performed by: NURSE PRACTITIONER

## 2024-11-08 NOTE — PROGRESS NOTES
"Rias is a 53 year old who is being evaluated via a billable video visit.    How would you like to obtain your AVS? TigerText  If the video visit is dropped, the invitation should be resent by: Text to cell phone: [phone]  Will anyone else be joining your video visit? No      Assessment & Plan     Screen for colon cancer  Referral placed to do in [place]   - Colonoscopy Screening  Referral; Future    Elevated BP without diagnosis of hypertension  Check home BP and record via TigerText message  Gaol < 140/90    Class 2 obesity due to excess calories without serious comorbidity with body mass index (BMI) of 39.0 to 39.9 in adult  BMI  Estimated body mass index is 39.5 kg/m  as calculated from the following:    Height as of 5/13/24: 1.6 m (5' 3\").    Weight as of 5/13/24: 101.2 kg (223 lb).   Weight management plan: Patient referred to endocrine and/or weight management specialty Discussed healthy diet and exercise guidelines    Follow up with Oncology as planned- discuss HORMONAL REPLACEMENT THERAPY   Work on weight loss  Regular exercise  Call or return to the clinic with any worsening of symptoms or no resolution. Patient/Parent verbalized understanding and is in agreement. Medication side effects reviewed.   Current Outpatient Medications   Medication Sig Dispense Refill    multivitamin w/minerals (THERA-VIT-M) tablet Take 1 tablet by mouth daily      paragard intrauterine copper device 1 each by Intrauterine route once (Patient not taking: Reported on 5/13/2024)      vitamin C (ASCORBIC ACID) 1000 MG TABS Take 1,000 mg by mouth daily       Chart documentation with Dragon Voice recognition Software. Although reviewed after completion, some words and grammatical errors may remain.  Janet Yap MSN,FNP-[facility]  [id number] [place] [id number]  [phone]        See Patient Instructions    Subjective   Risa is a 53 year old, presenting for the following " "health issues:  Establish Care        11/8/2024     7:48 AM   Additional Questions   Roomed by Daxa ALCOCER     Colon Screening wants referral for [place]   Scans have been good since initial breast cancer dx  Anxiety when goes for appts.  going with this year.   Diagnosis breast cancer 5/10/2022 9/14/2022 finished treatment. Last Chemo day  Last surgery 12/2022     Lab Results   Component Value Date    PAP NIL 12/01/2016         Component  Ref Range & Units    Interpretation   Negative for Intraepithelial Lesion or Malignancy (NILM)   Electronically signed by Glenn Jimenez CT (ASCP) on 4/19/2022 at  8:28 AM      Collected 4/14/2022  3:50 PM       Status: Final result       Visible to patient: Yes (seen)       Dx: Cervical cancer screening    1 Result Note       1 Patient Communication       1  Topic         Component  Ref Range & Units     Other HR HPV  Negative Negative    HPV16 DNA  Negative Negative    HPV18 DNA  Negative Negative    FINAL DIAGNOSIS    This patient's sample is negative for HPV DNA.        Struggling with weight since CHEMO  Wt Readings from Last 5 Encounters:   05/13/24 101.2 kg (223 lb)   11/01/23 96.6 kg (213 lb)   04/17/23 101.3 kg (223 lb 6.4 oz)   12/16/22 96.9 kg (213 lb 9.6 oz)   10/17/22 98.4 kg (217 lb)   Weight 215 exercising and 1200 calorie  My fitness.,   No LMP recorded (lmp unknown). (Menstrual status: IUD).  Menopausal no period since 2022 chemo  Hot flashes and night sweats more tolerable.   Estimated body mass index is 39.5 kg/m  as calculated from the following:    Height as of 5/13/24: 1.6 m (5' 3\").    Weight as of 5/13/24: 101.2 kg (223 lb).  Has upcoming appt with oncology for plan going forward for cancer monitoring        Review of Systems  Constitutional, neuro, ENT, endocrine, pulmonary, cardiac, gastrointestinal, genitourinary, musculoskeletal, integument and psychiatric systems are negative, except as otherwise noted.      Objective    Vitals - Patient " Reported  Systolic (Patient Reported): 138  Diastolic (Patient Reported): 82  Weight (Patient Reported): 97.5 kg (215 lb)      Vitals:  No vitals were obtained today due to virtual visit.    Physical Exam   GENERAL: alert and no distress  EYES: Eyes grossly normal to inspection.  No discharge or erythema, or obvious scleral/conjunctival abnormalities.  RESP: No audible wheeze, cough, or visible cyanosis.    SKIN: Visible skin clear. No significant rash, abnormal pigmentation or lesions.  NEURO: Cranial nerves grossly intact.  Mentation and speech appropriate for age.  PSYCH: Appropriate affect, tone, and pace of words    Office Visit on 12/16/2022   Component Date Value Ref Range Status    hCG Urine Qualitative 12/16/2022 Negative  Negative Final    This test is for screening purposes.  Results should be interpreted along with the clinical picture.  Confirmation testing is available if warranted by ordering [id number], HCG Quantitative Pregnancy.         Video-Visit Details    Type of service:  Video Visit   Originating Location (pt. Location): Home    Distant Location (provider location):  On-site  Platform used for Video Visit: Shashank  Signed Electronically by: JIMBO Diaz CNP

## 2024-11-08 NOTE — TELEPHONE ENCOUNTER
"Endoscopy Scheduling Screen      What insurance is in the chart?  Other:  BCBS    Ordering/Referring Provider: Janet Yap APRN CNP    (If ordering provider performs procedure, schedule with ordering provider unless otherwise instructed. )    BMI: There is no height or weight on file to calculate BMI.     Sedation Ordered  general anesthesia.   BMI<= 45 45 < BMI <= 48 48 < BMI < = 50  BMI > 50   No Restrictions No [facility]  No [facility]  [facility] with exceptions Hospital Only OR Only       Do you have a history of malignant hyperthermia?  No    (Females) Are you currently pregnant?   No     Have you been diagnosed or told you have pulmonary hypertension?   No    Do you have any heart conditions?  No     Do you have an LVAD?  No    Have you been told you have moderate to severe sleep apnea?  No.    Have you been told you have COPD, asthma, or any other lung disease?  No    Have you ever had or are you waiting for an organ transplant?  No. Continue scheduling, no site restrictions.    Have you had a stroke or transient ischemic attack (TIA aka \"mini stroke\" in the last 6 months?   No    Have you been diagnosed with or been told you have cirrhosis of the liver?   No.    Are you currently on dialysis?   No    Do you need assistance transferring?   No    BMI: There is no height or weight on file to calculate BMI.     Is patients BMI > 50?  No    BMI > 40?  No    Do you have a diagnosis of diabetes?  No    Do you take an injectable medication for weight loss or diabetes (excluding insulin)?  No    Do you take the medication Naltrexone?  No    Do you take blood thinners?  No     Prep   Are you currently on dialysis or do you have chronic kidney disease?  No    Do you have a diagnosis of cystic fibrosis (CF)?  No    On a regular basis do you go 3 -5 days between bowel movements?  No    Preferred [facility]    Final Scheduling Details     Procedure scheduled  Colonoscopy    Surgeon:  Socorro     Date of " procedure:  1/17/25     Location  [place] - Per order.    What is your communication preference for Instructions and/or Bowel Prep?   G5    Patient Reminders:    You will receive a call from a Nurse to review instructions and health history.  This assessment must be completed prior to your procedure.  Failure to complete the Nurse assessment may result in the procedure being cancelled.       On the day of your procedure, please designate an adult(s) who can drive you home stay with you for the next 24 hours. The medicines used in the exam will make you sleepy. You will not be able to drive.       You cannot take public transportation, ride share services, or non-medical taxi service without a responsible caregiver.  Medical transport services are allowed with the requirement that a responsible caregiver will receive you at your destination.  We require that drivers and caregivers are confirmed prior to your procedure.

## 2024-11-11 ENCOUNTER — LAB (OUTPATIENT)
Dept: LAB | Facility: CLINIC | Age: 54
End: 2024-11-11
Attending: NURSE PRACTITIONER
Payer: COMMERCIAL

## 2024-11-11 ENCOUNTER — HOSPITAL ENCOUNTER (OUTPATIENT)
Dept: CT IMAGING | Facility: CLINIC | Age: 54
Discharge: HOME | End: 2024-11-11
Attending: NURSE PRACTITIONER
Payer: COMMERCIAL

## 2024-11-11 DIAGNOSIS — Z17.1 MALIGNANT NEOPLASM OF CENTRAL PORTION OF RIGHT BREAST IN FEMALE, ESTROGEN RECEPTOR NEGATIVE (H): ICD-10-CM

## 2024-11-11 DIAGNOSIS — C50.111 MALIGNANT NEOPLASM OF CENTRAL PORTION OF RIGHT BREAST IN FEMALE, ESTROGEN RECEPTOR NEGATIVE (H): ICD-10-CM

## 2024-11-11 DIAGNOSIS — D05.12 DUCTAL CARCINOMA IN SITU (DCIS) OF LEFT BREAST: ICD-10-CM

## 2024-11-11 DIAGNOSIS — R91.8 PULMONARY NODULES: ICD-10-CM

## 2024-11-11 LAB
BASOPHILS # BLD AUTO: 0 10E3/UL (ref 0–0.2)
BASOPHILS NFR BLD AUTO: 0 %
EOSINOPHIL # BLD AUTO: 0.1 10E3/UL (ref 0–0.7)
EOSINOPHIL NFR BLD AUTO: 2 %
ERYTHROCYTE [DISTWIDTH] IN BLOOD BY AUTOMATED COUNT: 13.1 % (ref 10–15)
HCT VFR BLD AUTO: 41.5 % (ref 35–47)
HGB BLD-MCNC: 13.8 G/DL (ref 11.7–15.7)
IMM GRANULOCYTES # BLD: 0 10E3/UL
IMM GRANULOCYTES NFR BLD: 0 %
LYMPHOCYTES # BLD AUTO: 1.3 10E3/UL (ref 0.8–5.3)
LYMPHOCYTES NFR BLD AUTO: 17 %
MCH RBC QN AUTO: 33.7 PG (ref 26.5–33)
MCHC RBC AUTO-ENTMCNC: 33.3 G/DL (ref 31.5–36.5)
MCV RBC AUTO: 101 FL (ref 78–100)
MONOCYTES # BLD AUTO: 0.4 10E3/UL (ref 0–1.3)
MONOCYTES NFR BLD AUTO: 5 %
NEUTROPHILS # BLD AUTO: 5.6 10E3/UL (ref 1.6–8.3)
NEUTROPHILS NFR BLD AUTO: 75 %
NRBC # BLD AUTO: 0 10E3/UL
NRBC BLD AUTO-RTO: 0 /100
PLATELET # BLD AUTO: 211 10E3/UL (ref 150–450)
RBC # BLD AUTO: 4.1 10E6/UL (ref 3.8–5.2)
WBC # BLD AUTO: 7.4 10E3/UL (ref 4–11)

## 2024-11-11 PROCEDURE — 36415 COLL VENOUS BLD VENIPUNCTURE: CPT

## 2024-11-11 PROCEDURE — 71250 CT THORAX DX C-: CPT

## 2024-11-11 PROCEDURE — 85025 COMPLETE CBC W/AUTO DIFF WBC: CPT

## 2024-11-15 NOTE — PROGRESS NOTES
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    She has recovered generally well after her treatments. Ongoing symptoms include lower extremity arthralgias and grade 1 neuropathy of fingers.     No evidence of recurrent cancer on exam.   She has had a few recent headaches that are unusual for her, but no other neuro symptoms.     CBC: normal hgb,  (chronic mild macrocytosis). WBC/diff, plat WNL.     Plan:  -No role for mammogram surveillance post-bilateral mastectomies  -Continue every 6 mth exams. She prefers maintaining all of her follow-up at [facility]. She can follow with me in surveillance and I can have her re-establish with new Oncologist here or [place], as needed.   -Annual CBC post-chemo .  -Active surveillance in Oncology through 5 yrs. Plan Survivorship Visit at 5 yrs    2.   Left breast ER/DE+ DCIS  Post-bilateral mastectomy. Additional treatment not needed    3.    Headaches, acute  In the last 6-weeks, she's had 2 similar headaches with some vision changes during onset. They alleviate with Tylenol. No other neuro symptoms. No exam findings.     Plan:  -Monitor closely. If recurrent, she will update me and I'd recommended brain MRI to rule breast cancer metastasis. If MRI was negative, she should follow-up with her PCP on this    4.    Menopausal syndrome/hot flashes  She is inquiring about estrogen replacement options.   Technically speaking, may be low risk since her invasive higher risk cancer was hormone negative. She did have ER+ DCIS and underwent bilateral mastectomies  so she's eliminated the risk of developing a new DCIS/breast cancer from that (that doesn't carry the risk of metastasizing). However, her mastectomies were skin-sparing so there still remains a very slight risk of local recurrence in skin/chest wall that estrogen replacement could provoke.     Plan:  -Recommend avoiding estrogen replacement if at all possible. Other options could be considered.   -selective serotonin reuptake inhibitor (ie venlofaxine), gabapentin (may also co-treat residual peripheral neuropathy), or clonidine patches are alternative options to consider     5.    Macrocytosis, mild  Chronic. No anemia.   Differentials include post-chemo, thyroid etiology, vitamin def (folate, B12) or ETOH use.     Plan:  -Check TSH, Vit B12, folate and treat if indicated  -Reduce ETOH intake  -Monitor annually     6.   Pulmonary nodule, stable (favored benign)  Indeterminate on staging CT. Repeat annual CTs (10/2023  + current) showed stable 2 mm RUL nodule.     2.5 pk yr history (0.5 pk x 5 yrs); Quit 1995.    Plan:  -Given stability x 2 yrs, presumed benign  -No risk factors to continue routine CT chest surveillance/screening    ______________________________________________________________________________    History of Present Illness/ Interval History    Ms. Risa Alcaraz is a 53 year old with right triple negative low-grade invasive right breast cancer and left breast DCIS. She is s/p bilateral mastectomies and adjuvant chemo with Taxotere and Cytoxan (completed 2 yr ago). Returns for 6-mth visit.     Since her last visit, she's doing generally well.  Since chemo, has had ongoing LE joint pain and undergoing chiropractor sessions. 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    No new focal bone pain.  No chest wall masses/changes. No weight loss.     She has had 2 similar headaches in the last 6 weeks which is a bit unusual for her.  They last 2-3 hr and she had some visual change during the onset of the headache. Alleviated with Tylenol. No other neuro symptoms.     Since on chemo, she went in to menopause. She had her estrogen replacement stopped around the same time. She is having significant hot flashes and her PCP wanted her to ask our opinion on estrogen replacement.    Drinks 3-4 nights/week (1-2 drinks/setting).     ECOG Performance    0      Oncology History/Treatment  Diagnosis/Stage:   5/2022: Stage IA (iF7n-T0-R0) right breast low-grade metaplastic cancer, triple negative AND left breast DCIS (ER/NC+)  -mammogram: distortion central R breast. L breast negative  -diagnostic mammogram: distortion retroareolar right breast. US R breast: 1.8 cm lesion right breast 12:00. No axillary adenopathy. US-biopsy invasive mammary ca with fatures of low-grade metaplastic carcinoma, fibromatosis type, associated DCIS, cribriform subtype, intermediate nuclear grade. ER neg, NC neg, HER2 neg.  -Breast MRI: 4.3 cm mass right breast correlating with biopsy proven cancer. Left breast showed non-mass enhancement 12:00. No adenopathy.   -US left breast: 2 cm hypoechoic area 11:00. Biopsy: grade 1 DCIS, no invasive ca or LVI. ER/%+.  -CTcap: No definite mets. Tiny lung nodules of indeterminate significance.   -Bone scan: no mets   -BRCA gene panel negative  -6/15/22 surgical path:  R breast mammary carcinoma with features of low-grade fibromatosis like metaplastic carcinoma; negative margins. Multifocal with total of 3 foci: 8 mm,  3mm, 2 mm. Extensive DCIS, grade 1 + 2 with involvement of sclerosing adenosis and cancerization of lobules, clear margins. Single R axillary LN negative.   L breast showed extensive DCIS involving extensive sclerosing adenosis, negative margins; no invasive carcinoma. Single L axillary LN negative.      Treatment:  6/15/2022: bilateral skin sparing mastectomies with bilateral axillary sLN excision and reconstruction with  tissue expanders    7/13 - 9/14/2022: adjuvant Taxotere + Cytoxan x 4 cycles completed    Physical Exam    GENERAL: Alert and oriented to time place and person. Seated comfortably. In no distress. , Des, accompanied.   HEAD: Atraumatic and normocephalic. No alopecia.  EYES: DENYS, EOMI. No erythema. No icterus.  LYMPH NODES: No palpable supraclavicular, cervical, axillary lymphadenopathy.  BREASTS: Bilateral mastectomy with reconstruction. No chest wall masses. No skin lesions/changes.   CHEST: clear to auscultation bilaterally. Resonant to percussion throughout bilaterally. Symmetrical breath movements bilaterally.  CVS: RRR.  ABDOMEN: Soft. Not tender. Not distended. No palpable hepatomegaly or splenomegaly. No other mass palpable. Bowel sounds present.  EXTREMITIES: Warm. No peripheral edema.  SKIN: no rash, or bruising or purpura.   NEURO: No gross deficit noted. Non-antalgic gait.      Lab Results    Recent Results (from the past week)   CBC with platelets and differential   Result Value Ref Range    WBC Count 7.4 4.0 - 11.0 10e3/uL    RBC Count 4.10 3.80 - 5.20 10e6/uL    Hemoglobin 13.8 11.7 - 15.7 g/dL    Hematocrit 41.5 35.0 - 47.0 %     (H) 78 - 100 fL    MCH 33.7 (H) 26.5 - 33.0 pg    MCHC 33.3 31.5 - 36.5 g/dL    RDW 13.1 10.0 - 15.0 %    Platelet Count 211 150 - 450 10e3/uL    % Neutrophils 75 %    % Lymphocytes 17 %    % Monocytes 5 %    % Eosinophils 2 %    % Basophils 0 %    % Immature Granulocytes 0 %    NRBCs per 100 WBC 0 <1 /100    Absolute Neutrophils 5.6 1.6 - 8.3 10e3/uL    Absolute Lymphocytes 1.3 0.8 - 5.3 10e3/uL    Absolute Monocytes 0.4 0.0 - 1.3 10e3/uL    Absolute Eosinophils 0.1 0.0 - 0.7 10e3/uL    Absolute Basophils 0.0 0.0 - 0.2 10e3/uL    Absolute Immature Granulocytes 0.0 <=0.4 10e3/uL    Absolute NRBCs 0.0 10e3/uL         Imaging    CT Chest w/o Contrast    Result Date: 11/11/2024  CT CHEST WITHOUT CONTRAST 11/11/2024 9:57 AM CLINICAL HISTORY: Lung nodule  follow-up. Pulmonary nodules. TECHNIQUE: CT chest without IV contrast. Multiplanar reformats were obtained. Dose reduction techniques were used. CONTRAST: None. COMPARISON: 10/20/2023 and 11/18/2022 FINDINGS: LUNGS AND PLEURA: Stable tiny nodule in the posterior right upper lobe adjacent to the major fissure on image 84 of series 4 has been stable for 2 years and should be benign. No new or enlarging nodules. No infiltrate or effusion. MEDIASTINUM/AXILLAE: Bilateral mastectomies and implants are noted. No axillary or mediastinal adenopathy. No pericardial effusion. The aorta is normal in caliber. CORONARY ARTERY CALCIFICATION: None UPPER ABDOMEN: No significant finding. MUSCULOSKELETAL: Unremarkable.     IMPRESSION: 1.  Stable tiny right upper lobe nodule has been stable for 2 years. YANDEL MUNIZ MD   SYSTEM ID:  [id number]     Billing  Total time 40 minutes, to include face to face visit, review of EMR, ordering, documentation and coordination of care on date of service.    complexity modifier for longitudinal care.       Signed by: Talia Bond NP

## 2024-11-18 ENCOUNTER — ONCOLOGY VISIT (OUTPATIENT)
Dept: ONCOLOGY | Facility: CLINIC | Age: 54
End: 2024-11-18
Attending: NURSE PRACTITIONER
Payer: COMMERCIAL

## 2024-11-18 VITALS
RESPIRATION RATE: 13 BRPM | HEART RATE: 84 BPM | SYSTOLIC BLOOD PRESSURE: 156 MMHG | BODY MASS INDEX: 38.44 KG/M2 | WEIGHT: 216.93 LBS | HEIGHT: 63 IN | OXYGEN SATURATION: 96 % | TEMPERATURE: 98.2 F | DIASTOLIC BLOOD PRESSURE: 86 MMHG

## 2024-11-18 DIAGNOSIS — D75.89 MACROCYTOSIS: Primary | ICD-10-CM

## 2024-11-18 DIAGNOSIS — R51.9 NONINTRACTABLE EPISODIC HEADACHE, UNSPECIFIED HEADACHE TYPE: ICD-10-CM

## 2024-11-18 DIAGNOSIS — C50.111 MALIGNANT NEOPLASM OF CENTRAL PORTION OF RIGHT BREAST IN FEMALE, ESTROGEN RECEPTOR NEGATIVE (H): ICD-10-CM

## 2024-11-18 DIAGNOSIS — N95.1 MENOPAUSAL SYNDROME (HOT FLASHES): ICD-10-CM

## 2024-11-18 DIAGNOSIS — D05.12 DUCTAL CARCINOMA IN SITU (DCIS) OF LEFT BREAST: ICD-10-CM

## 2024-11-18 DIAGNOSIS — Z17.1 MALIGNANT NEOPLASM OF CENTRAL PORTION OF RIGHT BREAST IN FEMALE, ESTROGEN RECEPTOR NEGATIVE (H): ICD-10-CM

## 2024-11-18 PROCEDURE — 99213 OFFICE O/P EST LOW 20 MIN: CPT | Performed by: NURSE PRACTITIONER

## 2024-11-18 PROCEDURE — 99215 OFFICE O/P EST HI 40 MIN: CPT | Performed by: NURSE PRACTITIONER

## 2024-11-18 PROCEDURE — G2211 COMPLEX E/M VISIT ADD ON: HCPCS | Performed by: NURSE PRACTITIONER

## 2024-11-18 ASSESSMENT — PAIN SCALES - GENERAL: PAINLEVEL_OUTOF10: NO PAIN (0)

## 2024-11-18 NOTE — LETTER
11/18/2024      Risa Alcaraz  [address] [address]      Dear Colleague,    Thank you for referring your patient, Risa Alcaraz, to the [facility]. Please see a copy of my visit note below.    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    She has recovered generally well after her treatments. Ongoing symptoms include lower extremity arthralgias and grade 1 neuropathy of fingers.     No evidence of recurrent cancer on exam.   She has had a few recent headaches that are unusual for her, but no other neuro symptoms.     CBC: normal hgb,  (chronic mild macrocytosis). WBC/diff, plat WNL.     Plan:  -No role for mammogram surveillance post-bilateral mastectomies  -Continue every 6 mth exams. She prefers maintaining all of her follow-up at [facility]. She can follow with me in surveillance and I can have her re-establish with new Oncologist here or [place], as needed.   -Annual CBC post-chemo .  -Active surveillance in Oncology through 5 yrs. Plan Survivorship Visit at 5 yrs    2.   Left breast ER/AK+ DCIS  Post-bilateral mastectomy. Additional treatment not needed    3.    Headaches, acute  In the last 6-weeks, she's had 2 similar headaches with some vision changes during onset. They alleviate with Tylenol. No other neuro symptoms. No exam findings.     Plan:  -Monitor closely. If recurrent, she will update me and I'd recommended brain MRI to rule breast cancer metastasis. If MRI was negative, she should follow-up with her PCP on this    4.     Menopausal syndrome/hot flashes  She is inquiring about estrogen replacement options.   Technically speaking, may be low risk since her invasive higher risk cancer was hormone negative. She did have ER+ DCIS and underwent bilateral mastectomies so she's eliminated the risk of developing a new DCIS/breast cancer from that (that doesn't carry the risk of metastasizing). However, her mastectomies were skin-sparing so there still remains a very slight risk of local recurrence in skin/chest wall that estrogen replacement could provoke.     Plan:  -Recommend avoiding estrogen replacement if at all possible. Other options could be considered.   -selective serotonin reuptake inhibitor (ie venlofaxine), gabapentin (may also co-treat residual peripheral neuropathy), or clonidine patches are alternative options to consider     5.    Macrocytosis, mild  Chronic. No anemia.   Differentials include post-chemo, thyroid etiology, vitamin def (folate, B12) or ETOH use.     Plan:  -Check TSH, Vit B12, folate and treat if indicated  -Reduce ETOH intake  -Monitor annually     6.   Pulmonary nodule, stable (favored benign)  Indeterminate on staging CT. Repeat annual CTs (10/2023  + current) showed stable 2 mm RUL nodule.     2.5 pk yr history (0.5 pk x 5 yrs); Quit 1995.    Plan:  -Given stability x 2 yrs, presumed benign  -No risk factors to continue routine CT chest surveillance/screening    ______________________________________________________________________________    History of Present Illness/ Interval History    Ms. Risa Alcaraz is a 53 year old with right triple negative low-grade invasive right breast cancer and left breast DCIS. She is s/p bilateral mastectomies and adjuvant chemo with Taxotere and Cytoxan (completed 2 yr ago). Returns for 6-mth visit.     Since her last visit, she's doing generally well.  Since chemo, has had ongoing LE joint pain and undergoing chiropractor sessions. Mild peripheral neuropathy residual  in fingers after chemo which is not painful nor interfering with her activities.    No new focal bone pain.  No chest wall masses/changes. No weight loss.     She has had 2 similar headaches in the last 6 weeks which is a bit unusual for her. They last 2-3 hr and she had some visual change during the onset of the headache. Alleviated with Tylenol. No other neuro symptoms.     Since on chemo, she went in to menopause. She had her estrogen replacement stopped around the same time. She is having significant hot flashes and her PCP wanted her to ask our opinion on estrogen replacement.    Drinks 3-4 nights/week (1-2 drinks/setting).     ECOG Performance    0      Oncology History/Treatment  Diagnosis/Stage:   5/2022: Stage IA (lD0c-V2-B6) right breast low-grade metaplastic cancer, triple negative AND left breast DCIS (ER/CT+)  -mammogram: distortion central R breast. L breast negative  -diagnostic mammogram: distortion retroareolar right breast. US R breast: 1.8 cm lesion right breast 12:00. No axillary adenopathy. US-biopsy invasive mammary ca with fatures of low-grade metaplastic carcinoma, fibromatosis type, associated DCIS, cribriform subtype, intermediate nuclear grade. ER neg, CT neg, HER2 neg.  -Breast MRI: 4.3 cm mass right breast correlating with biopsy proven cancer. Left breast showed non-mass enhancement 12:00. No adenopathy.   -US left breast: 2 cm hypoechoic area 11:00. Biopsy: grade 1 DCIS, no invasive ca or LVI. ER/%+.  -CTcap: No definite mets. Tiny lung nodules of indeterminate significance.   -Bone scan: no mets   -BRCA gene panel negative  -6/15/22 surgical path:  R breast mammary carcinoma with features of low-grade fibromatosis like metaplastic carcinoma; negative margins. Multifocal with total of 3 foci: 8 mm,  3mm, 2 mm. Extensive DCIS, grade 1 + 2 with involvement of sclerosing adenosis and cancerization of lobules, clear margins. Single R axillary LN negative.   L breast showed extensive  DCIS involving extensive sclerosing adenosis, negative margins; no invasive carcinoma. Single L axillary LN negative.      Treatment:  6/15/2022: bilateral skin sparing mastectomies with bilateral axillary sLN excision and reconstruction with tissue expanders    7/13 - 9/14/2022: adjuvant Taxotere + Cytoxan x 4 cycles completed    Physical Exam    GENERAL: Alert and oriented to time place and person. Seated comfortably. In no distress. , Des, accompanied.   HEAD: Atraumatic and normocephalic. No alopecia.  EYES: DENYS, EOMI. No erythema. No icterus.  LYMPH NODES: No palpable supraclavicular, cervical, axillary lymphadenopathy.  BREASTS: Bilateral mastectomy with reconstruction. No chest wall masses. No skin lesions/changes.   CHEST: clear to auscultation bilaterally. Resonant to percussion throughout bilaterally. Symmetrical breath movements bilaterally.  CVS: RRR.  ABDOMEN: Soft. Not tender. Not distended. No palpable hepatomegaly or splenomegaly. No other mass palpable. Bowel sounds present.  EXTREMITIES: Warm. No peripheral edema.  SKIN: no rash, or bruising or purpura.   NEURO: No gross deficit noted. Non-antalgic gait.      Lab Results    Recent Results (from the past week)   CBC with platelets and differential   Result Value Ref Range    WBC Count 7.4 4.0 - 11.0 10e3/uL    RBC Count 4.10 3.80 - 5.20 10e6/uL    Hemoglobin 13.8 11.7 - 15.7 g/dL    Hematocrit 41.5 35.0 - 47.0 %     (H) 78 - 100 fL    MCH 33.7 (H) 26.5 - 33.0 pg    MCHC 33.3 31.5 - 36.5 g/dL    RDW 13.1 10.0 - 15.0 %    Platelet Count 211 150 - 450 10e3/uL    % Neutrophils 75 %    % Lymphocytes 17 %    % Monocytes 5 %    % Eosinophils 2 %    % Basophils 0 %    % Immature Granulocytes 0 %    NRBCs per 100 WBC 0 <1 /100    Absolute Neutrophils 5.6 1.6 - 8.3 10e3/uL    Absolute Lymphocytes 1.3 0.8 - 5.3 10e3/uL    Absolute Monocytes 0.4 0.0 - 1.3 10e3/uL    Absolute Eosinophils 0.1 0.0 - 0.7 10e3/uL    Absolute Basophils 0.0 0.0 - 0.2  "10e3/uL    Absolute Immature Granulocytes 0.0 <=0.4 10e3/uL    Absolute NRBCs 0.0 10e3/uL         Imaging    CT Chest w/o Contrast    Result Date: 11/11/2024  CT CHEST WITHOUT CONTRAST 11/11/2024 9:57 AM CLINICAL HISTORY: Lung nodule follow-up. Pulmonary nodules. TECHNIQUE: CT chest without IV contrast. Multiplanar reformats were obtained. Dose reduction techniques were used. CONTRAST: None. COMPARISON: 10/20/2023 and 11/18/2022 FINDINGS: LUNGS AND PLEURA: Stable tiny nodule in the posterior right upper lobe adjacent to the major fissure on image 84 of series 4 has been stable for 2 years and should be benign. No new or enlarging nodules. No infiltrate or effusion. MEDIASTINUM/AXILLAE: Bilateral mastectomies and implants are noted. No axillary or mediastinal adenopathy. No pericardial effusion. The aorta is normal in caliber. CORONARY ARTERY CALCIFICATION: None UPPER ABDOMEN: No significant finding. MUSCULOSKELETAL: Unremarkable.     IMPRESSION: 1.  Stable tiny right upper lobe nodule has been stable for 2 years. YANDEL MUNIZ MD   SYSTEM ID:  [id number]     Billing  Total time 40 minutes, to include face to face visit, review of EMR, ordering, documentation and coordination of care on date of service.    complexity modifier for longitudinal care.       Signed by: Talia Bond NP        Oncology Rooming Note    November 18, 2024 8:21 AM   Risa Alcaraz is a 53 year old female who presents for:    Chief Complaint   Patient presents with     Oncology Clinic Visit     Malignant neoplasm of central portion of right breast in female, estrogen receptor negative - provider visit only     Initial Vitals: BP (!) 156/86 (BP Location: Right arm, Patient Position: Sitting, Cuff Size: Adult Regular)   Pulse 84   Temp 98.2  F (36.8  C) (Tympanic)   Resp 13   Ht 1.6 m (5' 3\")   Wt 98.4 kg (216 lb 14.9 oz)   LMP  (LMP Unknown)   SpO2 96%   BMI 38.43 kg/m   Estimated body mass index is 38.43 kg/m  as calculated " "from the following:    Height as of this encounter: 1.6 m (5' 3\").    Weight as of this encounter: 98.4 kg (216 lb 14.9 oz). Body surface area is 2.09 meters squared.  No Pain (0) Comment: Data Unavailable   No LMP recorded (lmp unknown). (Menstrual status: IUD).  Allergies reviewed: Yes  Medications reviewed: Yes    Medications: Medication refills not needed today.  Pharmacy name entered into [facility]:    [facility], [place] - [address] [facility], [place] - [address] [place] [address]    Frailty Screening:   Is the patient here for a new oncology consult visit in cancer care? 2. No      Clinical concerns: Questions about IUD - wondering about estrogen IUD option?       Betina Humphries MA                Again, thank you for allowing me to participate in the care of your patient.        Sincerely,        Talia Bond NP  "

## 2024-11-18 NOTE — PROGRESS NOTES
"Oncology Rooming Note    November 18, 2024 8:21 AM   Risa lAcaraz is a 53 year old female who presents for:    Chief Complaint   Patient presents with    Oncology Clinic Visit     Malignant neoplasm of central portion of right breast in female, estrogen receptor negative - provider visit only     Initial Vitals: BP (!) 156/86 (BP Location: Right arm, Patient Position: Sitting, Cuff Size: Adult Regular)   Pulse 84   Temp 98.2  F (36.8  C) (Tympanic)   Resp 13   Ht 1.6 m (5' 3\")   Wt 98.4 kg (216 lb 14.9 oz)   LMP  (LMP Unknown)   SpO2 96%   BMI 38.43 kg/m   Estimated body mass index is 38.43 kg/m  as calculated from the following:    Height as of this encounter: 1.6 m (5' 3\").    Weight as of this encounter: 98.4 kg (216 lb 14.9 oz). Body surface area is 2.09 meters squared.  No Pain (0) Comment: Data Unavailable   No LMP recorded (lmp unknown). (Menstrual status: IUD).  Allergies reviewed: Yes  Medications reviewed: Yes    Medications: Medication refills not needed today.  Pharmacy name entered into [facility]:    [facility] [place] [facility] [place], [place] - [address] [facility], [place] - [address] [place] [address]    Frailty Screening:   Is the patient here for a new oncology consult visit in cancer care? 2. No      Clinical concerns: Questions about IUD - wondering about estrogen IUD option?       Betina Humphries MA              "

## 2024-11-21 ENCOUNTER — LAB (OUTPATIENT)
Dept: LAB | Facility: CLINIC | Age: 54
End: 2024-11-21
Payer: COMMERCIAL

## 2024-11-21 DIAGNOSIS — D75.89 MACROCYTOSIS: ICD-10-CM

## 2024-11-21 LAB — TSH SERPL DL<=0.005 MIU/L-ACNC: 3.07 UIU/ML (ref 0.3–4.2)

## 2024-12-17 PROCEDURE — G0121 COLON CA SCRN NOT HI RSK IND: HCPCS

## 2025-01-17 ENCOUNTER — HOSPITAL ENCOUNTER (OUTPATIENT)
Facility: CLINIC | Age: 55
Discharge: HOME | End: 2025-01-17
Attending: SURGERY | Admitting: SURGERY
Payer: COMMERCIAL

## 2025-01-17 VITALS
DIASTOLIC BLOOD PRESSURE: 66 MMHG | SYSTOLIC BLOOD PRESSURE: 123 MMHG | TEMPERATURE: 98 F | OXYGEN SATURATION: 97 % | RESPIRATION RATE: 16 BRPM | HEART RATE: 66 BPM

## 2025-01-17 LAB — COLONOSCOPY: NORMAL

## 2025-01-17 PROCEDURE — 250N000009 HC RX 250: Performed by: PHYSICIAN ASSISTANT

## 2025-01-17 PROCEDURE — 370N000017 HC ANESTHESIA TECHNICAL FEE, PER MIN: Performed by: SURGERY

## 2025-01-17 PROCEDURE — G0121 COLON CA SCRN NOT HI RSK IND: HCPCS

## 2025-01-17 PROCEDURE — 45378 DIAGNOSTIC COLONOSCOPY: CPT | Performed by: SURGERY

## 2025-01-17 RX ORDER — LIDOCAINE 40 MG/G
CREAM TOPICAL
Status: DISCONTINUED | OUTPATIENT
Start: 2025-01-17 | End: 2025-01-17 | Stop reason: HOSPADM

## 2025-01-17 RX ADMIN — LIDOCAINE HYDROCHLORIDE 0.1 ML: 10 INJECTION, SOLUTION EPIDURAL; INFILTRATION; INTRACAUDAL; PERINEURAL at 08:30

## 2025-01-17 ASSESSMENT — ACTIVITIES OF DAILY LIVING (ADL)
ADLS_ACUITY_SCORE: 41

## 2025-01-17 NOTE — H&P
[facility]    Pre-Endoscopy History and Physical     Risa Alcaraz MRN# [id number]   YOB: 1970 Age: 54 year old     Date of Procedure: 1/17/2025  Primary care provider: Janet Yap  Type of Endoscopy: Colonoscopy with possible biopsy, possible polypectomy  Reason for Procedure: screening  Type of Anesthesia Anticipated: MAC    HPI:    Risa is a 54 year old female who will be undergoing the above procedure.  Colon 1st; no famhx of colon ca; no blood thinner    A history and physical has been performed. The patient's medications and allergies have been reviewed. The risks and benefits of the procedure and the sedation options and risks were discussed with the patient.  All questions were answered and informed consent was obtained.      She denies a personal or family history of anesthesia complications or bleeding disorders.     Patient Active Problem List   Diagnosis    Allergic rhinitis    CARDIOVASCULAR SCREENING; LDL GOAL LESS THAN 160    Obesity    IUD (intrauterine device) in place    Metaplastic carcinoma of breast (H)    Malignant neoplasm of central portion of right breast in female, estrogen receptor negative (H)    Ductal carcinoma in situ (DCIS) of left breast    Macrocytosis        Past Medical History:   Diagnosis Date    Obese         Past Surgical History:   Procedure Laterality Date    BIOPSY NODE SENTINEL Bilateral 6/15/2022    Procedure: BILATERAL AXILLARY SENTINEL LYMPH NODE BIOPSIES;  Surgeon: Daria Sigala MD;  Location: [facility] OR    INSERT PORT VASCULAR ACCESS N/A 6/15/2022    Procedure: PORT PLACEMENT;  Surgeon: Daria Sigala MD;  Location: [facility] OR    MASTECTOMY SIMPLE BILATERAL Bilateral 6/15/2022    Procedure: BILATERAL SKIN SPARING MASTECTOMIES;  Surgeon: Daria Sigala MD;  Location:  OR    RECONSTRUCT BREAST, INSERT TISSUE EXPANDER BILATERAL, COMBINED Bilateral 6/15/2022    Procedure: BILATERAL BREAST RECONSTRUCTION WITH TISSUE  "EXPANDERS;  Surgeon: Charles Ladd MD;  Location:  OR    REMOVE PORT VASCULAR ACCESS N/A 10/17/2022    Procedure: PORT REMOVAL;  Surgeon: Daria Sigala MD;  Location:  OR    [facility] ORAL SURGERY PROCEDURE      wisdom teeth, hypotension with anesthesia       Social History     Tobacco Use    Smoking status: Former     Current packs/day: 0.00     Types: Cigarettes, Cigars     Quit date:      Years since quittin.0    Smokeless tobacco: Never    Tobacco comments:     Quit    Substance Use Topics    Alcohol use: Yes     Comment: occ       Family History   Problem Relation Age of Onset    Diabetes Mother     Hypertension Mother     Cancer Father         penial    Hypertension Father     Other Cancer Father     Cancer Maternal Grandmother         liver    Heart Disease Maternal Grandfather         MI    Cancer Paternal Grandmother         brain    Heart Disease Paternal Grandfather         MI       Prior to Admission medications    Medication Sig Start Date End Date Taking? Authorizing Provider   multivitamin w/minerals (THERA-VIT-M) tablet Take 1 tablet by mouth daily   Yes Reported, Patient   vitamin C (ASCORBIC ACID) 1000 MG TABS Take 1,000 mg by mouth daily   Yes Reported, Patient   paragard intrauterine copper device 1 each by Intrauterine route once.    Betina Pratt MD       No Known Allergies     REVIEW OF SYSTEMS:   5 point ROS negative except as noted above in HPI, including Gen., Resp., CV, GI &  system review.    PHYSICAL EXAM:   BP (!) 152/95   Pulse 79   Temp 98.1  F (36.7  C) (Oral)   Resp 16   SpO2 96%  Estimated body mass index is 38.43 kg/m  as calculated from the following:    Height as of 24: 1.6 m (5' 3\").    Weight as of 24: 98.4 kg (216 lb 14.9 oz).   Constitutional: Awake, alert, no acute distress.  Eyes: No scleral icterus.  Conjunctiva are without injection.  ENMT: Mucous membranes moist, dentition and gums are intact.   Neck: Soft, supple, trachea " midline.    Endocrine: n/a   Lymphatic: There is no cervical, submandibularadenopathy.  Respiratory: normal effortgs   Cardiovascular: S1, S2  Abdomen: Non-distended, non-tender,  No masses,  Musculoskeletal: No spinal or CVA tenderness. Full range of motion in the upper and lower extremities.    Skin: No skin rashes or lesions to inspection.  No petechia.    Neurologic: alerted and oriented 3x  Psychiatric: The patient's affect is not blunted and mood is appropriate.  DIAGNOSTICS:    Not indicated    IMPRESSION   ASA Class 2 - Mild systemic disease    PLAN:   Plan for Colonoscopy with possible biopsy, possible polypectomy. We discussed the risks, benefits and alternatives and the patient wished to proceed.  Patient is cleared for the above procedure.    The above has been forwarded to the consulting provider.    [facility], [facility]

## 2025-06-21 ENCOUNTER — HEALTH MAINTENANCE LETTER (OUTPATIENT)
Age: 55
End: 2025-06-21

## (undated) DEVICE — SOL WATER IRRIG 1000ML BOTTLE 2F7114

## (undated) DEVICE — ESU GROUND PAD UNIVERSAL W/O CORD

## (undated) DEVICE — SU MONOCRYL 4-0 PS-2 18" UND Y496G

## (undated) DEVICE — DRAIN JACKSON PRATT 07MM FLAT 3/4 PERF

## (undated) DEVICE — BNDG ELASTIC 6" DBL LENGTH UNSTERILE 6611-16

## (undated) DEVICE — DRAPE COVER C-ARM SEAMLESS SNAP-KAP 03-KP26 LATEX FREE

## (undated) DEVICE — SU SILK 2-0 SH 30" K833H

## (undated) DEVICE — DECANTER VIAL 2006S

## (undated) DEVICE — CLIP ETHICON LIGACLIP SM BLUE LT100

## (undated) DEVICE — GLOVE PROTEXIS W/NEU-THERA 7.5  2D73TE75

## (undated) DEVICE — GLOVE PROTEXIS BLUE W/NEU-THERA 7.0  2D73EB70

## (undated) DEVICE — GLOVE BIOGEL PI SZ 6.5 40865

## (undated) DEVICE — SOL NACL 0.9% INJ 1000ML BAG 2B1324X

## (undated) DEVICE — SU VICRYL 3-0 PS-1 18" UND J683

## (undated) DEVICE — DRAPE BREAST/CHEST 29420

## (undated) DEVICE — SU VICRYL 3-0 SH 27" J316H

## (undated) DEVICE — GLOVE PROTEXIS W/NEU-THERA 7.0  2D73TE70

## (undated) DEVICE — KIT SPY ELITE DISP KIT W/DRAPE SGL PACK LC3001

## (undated) DEVICE — SOL NACL 0.9% IRRIG 1000ML BOTTLE 2F7124

## (undated) DEVICE — SPONGE LAP 18X18" X8435

## (undated) DEVICE — ESU ELEC BLADE 2.75" COATED/INSULATED E1455

## (undated) DEVICE — DRSG KERLIX 4 1/2"X4YDS ROLL 6715

## (undated) DEVICE — DRSG STERI STRIP 1/2X4" B1557

## (undated) DEVICE — TUBING INFUSION INFILTRATION LIPOSUCTION 156" 24-6008

## (undated) DEVICE — DRAPE LAP TRANSVERSE 29421

## (undated) DEVICE — SOL NACL 0.9% INJ 250ML BAG 2B1322Q

## (undated) DEVICE — ESU ELEC BLADE NN-STCK PLUMEPEN PRO 360D 10FT PLPRO4020

## (undated) DEVICE — DRSG STERI STRIP 1/2X4" R1547

## (undated) DEVICE — SUCTION CANISTER MEDIVAC LINER 3000ML W/LID 65651-530

## (undated) DEVICE — SYR 10ML LL W/O NDL

## (undated) DEVICE — GEL ULTRASOUND AQUASONIC 20GM 01-01

## (undated) DEVICE — COVER ULTRASOUND PROBE 6X48" PC1290

## (undated) DEVICE — NDL 19GA 1.5"

## (undated) DEVICE — PREP CHLORAPREP W/ORANGE TINT 10.5ML 930715

## (undated) DEVICE — DRSG GAUZE 4X4" 3033

## (undated) DEVICE — SU VICRYL 2-0 CT-1 27" UND J259H

## (undated) DEVICE — NDL 22GA 1.5"

## (undated) DEVICE — SU SILK 2-0 FSL 18" 677G

## (undated) DEVICE — PREP SKIN SCRUB TRAY 4461A

## (undated) DEVICE — GLOVE PROTEXIS W/NEU-THERA 6.5  2D73TE65

## (undated) DEVICE — LINEN TOWEL PACK X5 5464

## (undated) DEVICE — SYR 10ML SLIP TIP W/O NDL

## (undated) DEVICE — ESU PENCIL W/SMOKE EVAC NEPTUNE STRYKER 0703-046-000

## (undated) DEVICE — SYR 50ML LL W/O NDL 309653

## (undated) DEVICE — ADH SKIN CLOSURE PREMIERPRO EXOFIN MICOR HV 0.5ML 3471

## (undated) DEVICE — PAD CHUX UNDERPAD 23X24" 7136

## (undated) DEVICE — DECANTER BAG 2002S

## (undated) DEVICE — BLADE KNIFE SURG 15 371115

## (undated) DEVICE — SU VICRYL 4-0 PS-2 18" UND J496H

## (undated) DEVICE — DRAIN JACKSON PRATT RESERVOIR 100ML SU130-1305

## (undated) DEVICE — PACK MAJOR SBA15MAFSI

## (undated) DEVICE — SU PROLENE 2-0 CT-2 30" 8411H

## (undated) DEVICE — CATH TRAY FOLEY SURESTEP 16FR W/URNE MTR STLK LATEX A303316A

## (undated) DEVICE — PACK MINOR SBA15MIFSE

## (undated) DEVICE — CATH TRAY FOLEY 16FR BARDEX W/DRAIN BAG STATLOCK 300316A

## (undated) DEVICE — BLADE KNIFE SURG 11 371111

## (undated) DEVICE — SUTURE BOOTS 051003PBX

## (undated) DEVICE — SU VICRYL 3-0 TIE 12X18" J904T

## (undated) DEVICE — SYR 10ML FINGER CONTROL W/O NDL 309695

## (undated) RX ORDER — HYDROMORPHONE HCL IN WATER/PF 6 MG/30 ML
PATIENT CONTROLLED ANALGESIA SYRINGE INTRAVENOUS
Status: DISPENSED
Start: 2022-06-15

## (undated) RX ORDER — PROPOFOL 10 MG/ML
INJECTION, EMULSION INTRAVENOUS
Status: DISPENSED
Start: 2022-06-15

## (undated) RX ORDER — ONDANSETRON 2 MG/ML
INJECTION INTRAMUSCULAR; INTRAVENOUS
Status: DISPENSED
Start: 2022-10-17

## (undated) RX ORDER — INDOCYANINE GREEN AND WATER 25 MG
KIT INJECTION
Status: DISPENSED
Start: 2022-06-15

## (undated) RX ORDER — FENTANYL CITRATE 0.05 MG/ML
INJECTION, SOLUTION INTRAMUSCULAR; INTRAVENOUS
Status: DISPENSED
Start: 2022-06-15

## (undated) RX ORDER — NEOSTIGMINE METHYLSULFATE 1 MG/ML
VIAL (ML) INJECTION
Status: DISPENSED
Start: 2022-06-15

## (undated) RX ORDER — DEXAMETHASONE SODIUM PHOSPHATE 4 MG/ML
INJECTION, SOLUTION INTRA-ARTICULAR; INTRALESIONAL; INTRAMUSCULAR; INTRAVENOUS; SOFT TISSUE
Status: DISPENSED
Start: 2022-06-15

## (undated) RX ORDER — HYDROMORPHONE HYDROCHLORIDE 1 MG/ML
INJECTION, SOLUTION INTRAMUSCULAR; INTRAVENOUS; SUBCUTANEOUS
Status: DISPENSED
Start: 2022-06-15

## (undated) RX ORDER — PROPOFOL 10 MG/ML
INJECTION, EMULSION INTRAVENOUS
Status: DISPENSED
Start: 2022-10-17

## (undated) RX ORDER — CEFAZOLIN SODIUM 1 G/3ML
INJECTION, POWDER, FOR SOLUTION INTRAMUSCULAR; INTRAVENOUS
Status: DISPENSED
Start: 2022-06-15

## (undated) RX ORDER — FENTANYL CITRATE 50 UG/ML
INJECTION, SOLUTION INTRAMUSCULAR; INTRAVENOUS
Status: DISPENSED
Start: 2022-10-17

## (undated) RX ORDER — LIDOCAINE HYDROCHLORIDE 10 MG/ML
INJECTION, SOLUTION EPIDURAL; INFILTRATION; INTRACAUDAL; PERINEURAL
Status: DISPENSED
Start: 2025-01-17

## (undated) RX ORDER — LIDOCAINE HYDROCHLORIDE 20 MG/ML
INJECTION, SOLUTION EPIDURAL; INFILTRATION; INTRACAUDAL; PERINEURAL
Status: DISPENSED
Start: 2022-10-17

## (undated) RX ORDER — FENTANYL CITRATE 50 UG/ML
INJECTION, SOLUTION INTRAMUSCULAR; INTRAVENOUS
Status: DISPENSED
Start: 2022-06-15

## (undated) RX ORDER — GLYCOPYRROLATE 0.2 MG/ML
INJECTION, SOLUTION INTRAMUSCULAR; INTRAVENOUS
Status: DISPENSED
Start: 2022-06-15

## (undated) RX ORDER — ONDANSETRON 2 MG/ML
INJECTION INTRAMUSCULAR; INTRAVENOUS
Status: DISPENSED
Start: 2022-06-15

## (undated) RX ORDER — CEFAZOLIN SODIUM/WATER 2 G/20 ML
SYRINGE (ML) INTRAVENOUS
Status: DISPENSED
Start: 2022-06-15

## (undated) RX ORDER — LIDOCAINE HYDROCHLORIDE 20 MG/ML
INJECTION, SOLUTION EPIDURAL; INFILTRATION; INTRACAUDAL; PERINEURAL
Status: DISPENSED
Start: 2022-06-15